# Patient Record
Sex: FEMALE | Race: BLACK OR AFRICAN AMERICAN | Employment: FULL TIME | URBAN - METROPOLITAN AREA
[De-identification: names, ages, dates, MRNs, and addresses within clinical notes are randomized per-mention and may not be internally consistent; named-entity substitution may affect disease eponyms.]

---

## 2019-09-22 ENCOUNTER — HOSPITAL ENCOUNTER (EMERGENCY)
Age: 44
Discharge: HOME | End: 2019-09-22
Attending: EMERGENCY MEDICINE
Payer: MEDICARE

## 2019-09-22 ENCOUNTER — APPOINTMENT (OUTPATIENT)
Dept: GENERAL RADIOLOGY | Age: 44
End: 2019-09-22
Payer: MEDICARE

## 2019-09-22 VITALS
RESPIRATION RATE: 16 BRPM | WEIGHT: 240 LBS | HEART RATE: 69 BPM | BODY MASS INDEX: 36.37 KG/M2 | HEIGHT: 68 IN | SYSTOLIC BLOOD PRESSURE: 137 MMHG | OXYGEN SATURATION: 100 % | DIASTOLIC BLOOD PRESSURE: 99 MMHG | TEMPERATURE: 98.1 F

## 2019-09-22 DIAGNOSIS — M54.42 ACUTE BILATERAL LOW BACK PAIN WITH BILATERAL SCIATICA: Primary | ICD-10-CM

## 2019-09-22 DIAGNOSIS — M54.41 ACUTE BILATERAL LOW BACK PAIN WITH BILATERAL SCIATICA: Primary | ICD-10-CM

## 2019-09-22 PROCEDURE — 6370000000 HC RX 637 (ALT 250 FOR IP): Performed by: PHYSICIAN ASSISTANT

## 2019-09-22 PROCEDURE — 99283 EMERGENCY DEPT VISIT LOW MDM: CPT

## 2019-09-22 PROCEDURE — 96372 THER/PROPH/DIAG INJ SC/IM: CPT

## 2019-09-22 PROCEDURE — 72100 X-RAY EXAM L-S SPINE 2/3 VWS: CPT

## 2019-09-22 PROCEDURE — 6360000002 HC RX W HCPCS: Performed by: PHYSICIAN ASSISTANT

## 2019-09-22 RX ORDER — CYCLOBENZAPRINE HCL 10 MG
10 TABLET ORAL ONCE
Status: COMPLETED | OUTPATIENT
Start: 2019-09-22 | End: 2019-09-22

## 2019-09-22 RX ORDER — BUDESONIDE AND FORMOTEROL FUMARATE DIHYDRATE 160; 4.5 UG/1; UG/1
2 AEROSOL RESPIRATORY (INHALATION) 2 TIMES DAILY
COMMUNITY
Start: 2013-08-01

## 2019-09-22 RX ORDER — MONTELUKAST SODIUM 10 MG/1
10 TABLET ORAL DAILY
Status: ON HOLD | COMMUNITY
Start: 2013-08-01 | End: 2020-08-23

## 2019-09-22 RX ORDER — NAPROXEN 500 MG/1
500 TABLET ORAL 2 TIMES DAILY WITH MEALS
Qty: 30 TABLET | Refills: 0 | Status: ON HOLD | OUTPATIENT
Start: 2019-09-22 | End: 2020-08-23

## 2019-09-22 RX ORDER — LIDOCAINE 4 G/G
1 PATCH TOPICAL DAILY
Status: DISCONTINUED | OUTPATIENT
Start: 2019-09-22 | End: 2019-09-22 | Stop reason: HOSPADM

## 2019-09-22 RX ORDER — CYCLOBENZAPRINE HCL 5 MG
5 TABLET ORAL 3 TIMES DAILY PRN
Qty: 10 TABLET | Refills: 0 | Status: SHIPPED | OUTPATIENT
Start: 2019-09-22 | End: 2019-10-02

## 2019-09-22 RX ORDER — KETOROLAC TROMETHAMINE 30 MG/ML
15 INJECTION, SOLUTION INTRAMUSCULAR; INTRAVENOUS ONCE
Status: COMPLETED | OUTPATIENT
Start: 2019-09-22 | End: 2019-09-22

## 2019-09-22 RX ORDER — LEVOTHYROXINE SODIUM 175 UG/1
175 TABLET ORAL DAILY
COMMUNITY
Start: 2014-09-12

## 2019-09-22 RX ADMIN — CYCLOBENZAPRINE HYDROCHLORIDE 10 MG: 10 TABLET, FILM COATED ORAL at 10:40

## 2019-09-22 RX ADMIN — KETOROLAC TROMETHAMINE 15 MG: 30 INJECTION, SOLUTION INTRAMUSCULAR at 10:40

## 2019-09-22 ASSESSMENT — ENCOUNTER SYMPTOMS
CHEST TIGHTNESS: 0
NAUSEA: 0
ABDOMINAL PAIN: 0
SHORTNESS OF BREATH: 0
VOMITING: 0
BACK PAIN: 1

## 2019-09-22 ASSESSMENT — PAIN SCALES - GENERAL: PAINLEVEL_OUTOF10: 9

## 2019-09-22 NOTE — ED PROVIDER NOTES
Medications    BUDESONIDE-FORMOTEROL (SYMBICORT) 160-4.5 MCG/ACT AERO    Inhale 2 puffs into the lungs 2 times daily    LEVOTHYROXINE (SYNTHROID) 175 MCG TABLET    Take 175 mcg by mouth daily    MONTELUKAST (SINGULAIR) 10 MG TABLET    Take 10 mg by mouth daily       Allergies     She is allergic to penicillins. Physical Exam     INITIAL VITALS: BP: (!) 170/111, Temp: 98.1 °F (36.7 °C), Pulse: 79, Resp: 16, SpO2: 100 %  Physical Exam   Constitutional: She appears well-developed and well-nourished. No distress. HENT:   Head: Normocephalic and atraumatic. Eyes: Pupils are equal, round, and reactive to light. EOM are normal.   Cardiovascular: Normal rate, regular rhythm, normal heart sounds and intact distal pulses. Exam reveals no gallop and no friction rub. No murmur heard. Pulmonary/Chest: Effort normal and breath sounds normal. No respiratory distress. Musculoskeletal:   Mild midline lower lumbar spinal tenderness extending bilaterally into the point paraspinal muscle body with positive straight leg raise bilaterally, patellar reflexes are equal and normal bilaterally, gait assessed and normal, sensation intact in bilateral lower extremities with 2+ DP pulse, dorsiflexion and plantarflexion normal   Neurological: She is alert. Skin: She is not diaphoretic. Psychiatric: She has a normal mood and affect. Her behavior is normal.   Nursing note and vitals reviewed. Diagnostic Results     RADIOLOGY:  XR LUMBAR SPINE (2-3 VIEWS)   Final Result      Degenerative change      If concern for acute injury splinting with follow-up recommended          LABS:   No results found for this visit on 09/22/19. ED BEDSIDE ULTRASOUND:  none    RECENT VITALS:  BP: (!) 170/111, Temp: 98.1 °F (36.7 °C), Pulse: 79, Resp: 16, SpO2: 100 %     Procedures   None    ED Course     Nursing Notes, Past Medical Hx,Past Surgical Hx, Social Hx, Allergies, and Family Hx were reviewed.     The patient was given the following

## 2019-11-21 ENCOUNTER — APPOINTMENT (OUTPATIENT)
Dept: GENERAL RADIOLOGY | Age: 44
End: 2019-11-21
Payer: MEDICARE

## 2019-11-21 ENCOUNTER — HOSPITAL ENCOUNTER (EMERGENCY)
Age: 44
Discharge: HOME | End: 2019-11-21
Attending: EMERGENCY MEDICINE
Payer: MEDICARE

## 2019-11-21 VITALS
SYSTOLIC BLOOD PRESSURE: 201 MMHG | WEIGHT: 293 LBS | BODY MASS INDEX: 46.38 KG/M2 | RESPIRATION RATE: 18 BRPM | OXYGEN SATURATION: 99 % | HEART RATE: 80 BPM | TEMPERATURE: 98.2 F | DIASTOLIC BLOOD PRESSURE: 109 MMHG

## 2019-11-21 DIAGNOSIS — M79.89 LEG SWELLING: Primary | ICD-10-CM

## 2019-11-21 DIAGNOSIS — R60.0 LEG EDEMA: ICD-10-CM

## 2019-11-21 LAB
A/G RATIO: 1.3 (ref 1.1–2.2)
ALBUMIN SERPL-MCNC: 4.4 G/DL (ref 3.4–5)
ALP BLD-CCNC: 57 U/L (ref 40–129)
ALT SERPL-CCNC: 9 U/L (ref 10–40)
ANION GAP SERPL CALCULATED.3IONS-SCNC: 12 MMOL/L (ref 3–16)
AST SERPL-CCNC: 16 U/L (ref 15–37)
BILIRUB SERPL-MCNC: <0.2 MG/DL (ref 0–1)
BUN BLDV-MCNC: 19 MG/DL (ref 7–20)
CALCIUM SERPL-MCNC: 10 MG/DL (ref 8.3–10.6)
CHLORIDE BLD-SCNC: 103 MMOL/L (ref 99–110)
CO2: 20 MMOL/L (ref 21–32)
CREAT SERPL-MCNC: 1.2 MG/DL (ref 0.6–1.1)
D DIMER: <200 NG/ML DDU (ref 0–229)
GFR AFRICAN AMERICAN: 60
GFR NON-AFRICAN AMERICAN: 49
GLOBULIN: 3.5 G/DL
GLUCOSE BLD-MCNC: 92 MG/DL (ref 70–99)
MAGNESIUM: 1.5 MG/DL (ref 1.8–2.4)
POTASSIUM REFLEX MAGNESIUM: 3.5 MMOL/L (ref 3.5–5.1)
PRO-BNP: 418 PG/ML (ref 0–124)
SODIUM BLD-SCNC: 135 MMOL/L (ref 136–145)
TOTAL PROTEIN: 7.9 G/DL (ref 6.4–8.2)

## 2019-11-21 PROCEDURE — 36415 COLL VENOUS BLD VENIPUNCTURE: CPT

## 2019-11-21 PROCEDURE — 99283 EMERGENCY DEPT VISIT LOW MDM: CPT

## 2019-11-21 PROCEDURE — 83880 ASSAY OF NATRIURETIC PEPTIDE: CPT

## 2019-11-21 PROCEDURE — 71046 X-RAY EXAM CHEST 2 VIEWS: CPT

## 2019-11-21 PROCEDURE — 80053 COMPREHEN METABOLIC PANEL: CPT

## 2019-11-21 PROCEDURE — 83735 ASSAY OF MAGNESIUM: CPT

## 2019-11-21 PROCEDURE — 6370000000 HC RX 637 (ALT 250 FOR IP): Performed by: EMERGENCY MEDICINE

## 2019-11-21 PROCEDURE — 85379 FIBRIN DEGRADATION QUANT: CPT

## 2019-11-21 RX ORDER — MAGNESIUM 30 MG
30 TABLET ORAL DAILY
Qty: 30 TABLET | Refills: 3 | Status: ON HOLD | OUTPATIENT
Start: 2019-11-21 | End: 2020-08-23

## 2019-11-21 RX ORDER — POTASSIUM CHLORIDE 20 MEQ/1
20 TABLET, EXTENDED RELEASE ORAL DAILY
Qty: 30 TABLET | Refills: 0 | Status: ON HOLD | OUTPATIENT
Start: 2019-11-21 | End: 2020-08-23

## 2019-11-21 RX ORDER — HYDROCHLOROTHIAZIDE 25 MG/1
25 TABLET ORAL DAILY
Qty: 30 TABLET | Refills: 0 | Status: ON HOLD | OUTPATIENT
Start: 2019-11-21 | End: 2020-08-23

## 2019-11-21 RX ORDER — ACETAMINOPHEN 500 MG
1000 TABLET ORAL ONCE
Status: COMPLETED | OUTPATIENT
Start: 2019-11-21 | End: 2019-11-21

## 2019-11-21 RX ADMIN — ACETAMINOPHEN 1000 MG: 500 TABLET ORAL at 17:39

## 2019-11-21 ASSESSMENT — PAIN SCALES - GENERAL: PAINLEVEL_OUTOF10: 5

## 2019-11-25 ASSESSMENT — ENCOUNTER SYMPTOMS
NAUSEA: 0
COUGH: 0
ABDOMINAL PAIN: 0
CONSTIPATION: 0
EYES NEGATIVE: 1
DIARRHEA: 0

## 2020-08-23 ENCOUNTER — APPOINTMENT (OUTPATIENT)
Dept: GENERAL RADIOLOGY | Age: 45
DRG: 872 | End: 2020-08-23
Payer: COMMERCIAL

## 2020-08-23 ENCOUNTER — HOSPITAL ENCOUNTER (INPATIENT)
Age: 45
LOS: 2 days | Discharge: HOME | DRG: 872 | End: 2020-08-25
Attending: EMERGENCY MEDICINE | Admitting: INTERNAL MEDICINE
Payer: COMMERCIAL

## 2020-08-23 ENCOUNTER — APPOINTMENT (OUTPATIENT)
Dept: CT IMAGING | Age: 45
DRG: 872 | End: 2020-08-23
Payer: COMMERCIAL

## 2020-08-23 PROBLEM — E87.6 HYPOKALEMIA: Status: ACTIVE | Noted: 2020-08-23

## 2020-08-23 PROBLEM — D64.9 ANEMIA: Status: ACTIVE | Noted: 2020-08-23

## 2020-08-23 PROBLEM — E87.1 HYPONATREMIA: Status: ACTIVE | Noted: 2020-08-23

## 2020-08-23 PROBLEM — E89.0 POSTOPERATIVE HYPOTHYROIDISM: Status: ACTIVE | Noted: 2020-08-23

## 2020-08-23 PROBLEM — E11.9 TYPE 2 DIABETES MELLITUS (HCC): Status: ACTIVE | Noted: 2020-08-23

## 2020-08-23 PROBLEM — R51.9 HEADACHE: Status: ACTIVE | Noted: 2020-08-23

## 2020-08-23 PROBLEM — E66.01 MORBID (SEVERE) OBESITY DUE TO EXCESS CALORIES (HCC): Status: ACTIVE | Noted: 2020-08-23

## 2020-08-23 PROBLEM — N17.9 AKI (ACUTE KIDNEY INJURY) (HCC): Status: ACTIVE | Noted: 2020-08-23

## 2020-08-23 LAB
ALBUMIN SERPL-MCNC: 3.5 G/DL (ref 3.4–5)
ALP BLD-CCNC: 81 U/L (ref 40–129)
ALT SERPL-CCNC: 16 U/L (ref 10–40)
ANION GAP SERPL CALCULATED.3IONS-SCNC: 15 MMOL/L (ref 3–16)
AST SERPL-CCNC: 31 U/L (ref 15–37)
BACTERIA: ABNORMAL /HPF
BASOPHILS ABSOLUTE: 0.1 K/UL (ref 0–0.2)
BASOPHILS RELATIVE PERCENT: 0.8 %
BILIRUB SERPL-MCNC: 0.4 MG/DL (ref 0–1)
BILIRUBIN DIRECT: <0.2 MG/DL (ref 0–0.3)
BILIRUBIN URINE: NEGATIVE
BILIRUBIN, INDIRECT: NORMAL MG/DL (ref 0–1)
BLOOD, URINE: ABNORMAL
BUN BLDV-MCNC: 42 MG/DL (ref 7–20)
CALCIUM SERPL-MCNC: 10.5 MG/DL (ref 8.3–10.6)
CHLORIDE BLD-SCNC: 97 MMOL/L (ref 99–110)
CLARITY: ABNORMAL
CO2: 22 MMOL/L (ref 21–32)
COLOR: YELLOW
COMMENT UA: ABNORMAL
CREAT SERPL-MCNC: 2 MG/DL (ref 0.6–1.1)
EOSINOPHILS ABSOLUTE: 0 K/UL (ref 0–0.6)
EOSINOPHILS RELATIVE PERCENT: 0.4 %
EPITHELIAL CELLS, UA: 2 /HPF (ref 0–5)
FERRITIN: 235.5 NG/ML (ref 15–150)
FOLATE: 6.42 NG/ML (ref 4.78–24.2)
GFR AFRICAN AMERICAN: 33
GFR NON-AFRICAN AMERICAN: 27
GLUCOSE BLD-MCNC: 131 MG/DL (ref 70–99)
GLUCOSE BLD-MCNC: 167 MG/DL (ref 70–99)
GLUCOSE BLD-MCNC: 180 MG/DL (ref 70–99)
GLUCOSE URINE: NEGATIVE MG/DL
HCG(URINE) PREGNANCY TEST: NEGATIVE
HCT VFR BLD CALC: 30.9 % (ref 36–48)
HEMOGLOBIN: 10.6 G/DL (ref 12–16)
HYALINE CASTS: 5 /LPF (ref 0–8)
IRON SATURATION: 4 % (ref 15–50)
IRON: 9 UG/DL (ref 37–145)
KETONES, URINE: NEGATIVE MG/DL
LEUKOCYTE ESTERASE, URINE: NEGATIVE
LYMPHOCYTES ABSOLUTE: 0.9 K/UL (ref 1–5.1)
LYMPHOCYTES RELATIVE PERCENT: 10.8 %
MAGNESIUM: 1.6 MG/DL (ref 1.8–2.4)
MCH RBC QN AUTO: 34.6 PG (ref 26–34)
MCHC RBC AUTO-ENTMCNC: 34.2 G/DL (ref 31–36)
MCV RBC AUTO: 101.4 FL (ref 80–100)
MICROSCOPIC EXAMINATION: YES
MONOCYTES ABSOLUTE: 0.6 K/UL (ref 0–1.3)
MONOCYTES RELATIVE PERCENT: 6.8 %
NEUTROPHILS ABSOLUTE: 6.6 K/UL (ref 1.7–7.7)
NEUTROPHILS RELATIVE PERCENT: 81.2 %
NITRITE, URINE: NEGATIVE
PDW BLD-RTO: 13.8 % (ref 12.4–15.4)
PERFORMED ON: ABNORMAL
PERFORMED ON: ABNORMAL
PH UA: 5.5 (ref 5–8)
PLATELET # BLD: 304 K/UL (ref 135–450)
PMV BLD AUTO: 8.6 FL (ref 5–10.5)
POTASSIUM REFLEX MAGNESIUM: 3.1 MMOL/L (ref 3.5–5.1)
POTASSIUM SERPL-SCNC: 3.2 MMOL/L (ref 3.5–5.1)
POTASSIUM SERPL-SCNC: 3.2 MMOL/L (ref 3.5–5.1)
PROTEIN UA: 100 MG/DL
RBC # BLD: 3.05 M/UL (ref 4–5.2)
RBC UA: 16 /HPF (ref 0–4)
SARS-COV-2, NAAT: NOT DETECTED
SODIUM BLD-SCNC: 134 MMOL/L (ref 136–145)
SPECIFIC GRAVITY UA: 1.01 (ref 1–1.03)
T4 FREE: 1.3 NG/DL (ref 0.9–1.8)
TOTAL CK: 184 U/L (ref 26–192)
TOTAL IRON BINDING CAPACITY: 232 UG/DL (ref 260–445)
TOTAL PROTEIN: 7.6 G/DL (ref 6.4–8.2)
TROPONIN: <0.01 NG/ML
TSH SERPL DL<=0.05 MIU/L-ACNC: 1.55 UIU/ML (ref 0.27–4.2)
URINE REFLEX TO CULTURE: YES
URINE TYPE: ABNORMAL
UROBILINOGEN, URINE: 1 E.U./DL
VITAMIN B-12: 404 PG/ML (ref 211–911)
VITAMIN D 25-HYDROXY: 22.7 NG/ML
WBC # BLD: 8.2 K/UL (ref 4–11)
WBC UA: 22 /HPF (ref 0–5)
YEAST: PRESENT /HPF

## 2020-08-23 PROCEDURE — 96372 THER/PROPH/DIAG INJ SC/IM: CPT

## 2020-08-23 PROCEDURE — 94640 AIRWAY INHALATION TREATMENT: CPT

## 2020-08-23 PROCEDURE — 2580000003 HC RX 258: Performed by: PHYSICIAN ASSISTANT

## 2020-08-23 PROCEDURE — 82607 VITAMIN B-12: CPT

## 2020-08-23 PROCEDURE — 99285 EMERGENCY DEPT VISIT HI MDM: CPT

## 2020-08-23 PROCEDURE — U0002 COVID-19 LAB TEST NON-CDC: HCPCS

## 2020-08-23 PROCEDURE — 70450 CT HEAD/BRAIN W/O DYE: CPT

## 2020-08-23 PROCEDURE — 6360000002 HC RX W HCPCS: Performed by: PHYSICIAN ASSISTANT

## 2020-08-23 PROCEDURE — 82550 ASSAY OF CK (CPK): CPT

## 2020-08-23 PROCEDURE — 96365 THER/PROPH/DIAG IV INF INIT: CPT

## 2020-08-23 PROCEDURE — 96376 TX/PRO/DX INJ SAME DRUG ADON: CPT

## 2020-08-23 PROCEDURE — 82306 VITAMIN D 25 HYDROXY: CPT

## 2020-08-23 PROCEDURE — 84132 ASSAY OF SERUM POTASSIUM: CPT

## 2020-08-23 PROCEDURE — 2060000000 HC ICU INTERMEDIATE R&B

## 2020-08-23 PROCEDURE — 6370000000 HC RX 637 (ALT 250 FOR IP): Performed by: INTERNAL MEDICINE

## 2020-08-23 PROCEDURE — 93005 ELECTROCARDIOGRAM TRACING: CPT | Performed by: PHYSICIAN ASSISTANT

## 2020-08-23 PROCEDURE — 80076 HEPATIC FUNCTION PANEL: CPT

## 2020-08-23 PROCEDURE — 96374 THER/PROPH/DIAG INJ IV PUSH: CPT

## 2020-08-23 PROCEDURE — 80048 BASIC METABOLIC PNL TOTAL CA: CPT

## 2020-08-23 PROCEDURE — G0378 HOSPITAL OBSERVATION PER HR: HCPCS

## 2020-08-23 PROCEDURE — 82728 ASSAY OF FERRITIN: CPT

## 2020-08-23 PROCEDURE — 82746 ASSAY OF FOLIC ACID SERUM: CPT

## 2020-08-23 PROCEDURE — 83735 ASSAY OF MAGNESIUM: CPT

## 2020-08-23 PROCEDURE — 96375 TX/PRO/DX INJ NEW DRUG ADDON: CPT

## 2020-08-23 PROCEDURE — 96367 TX/PROPH/DG ADDL SEQ IV INF: CPT

## 2020-08-23 PROCEDURE — 6370000000 HC RX 637 (ALT 250 FOR IP): Performed by: PHYSICIAN ASSISTANT

## 2020-08-23 PROCEDURE — 84439 ASSAY OF FREE THYROXINE: CPT

## 2020-08-23 PROCEDURE — 84484 ASSAY OF TROPONIN QUANT: CPT

## 2020-08-23 PROCEDURE — 84703 CHORIONIC GONADOTROPIN ASSAY: CPT

## 2020-08-23 PROCEDURE — 2580000003 HC RX 258: Performed by: INTERNAL MEDICINE

## 2020-08-23 PROCEDURE — 83540 ASSAY OF IRON: CPT

## 2020-08-23 PROCEDURE — 81001 URINALYSIS AUTO W/SCOPE: CPT

## 2020-08-23 PROCEDURE — 83036 HEMOGLOBIN GLYCOSYLATED A1C: CPT

## 2020-08-23 PROCEDURE — 87086 URINE CULTURE/COLONY COUNT: CPT

## 2020-08-23 PROCEDURE — 84443 ASSAY THYROID STIM HORMONE: CPT

## 2020-08-23 PROCEDURE — 85025 COMPLETE CBC W/AUTO DIFF WBC: CPT

## 2020-08-23 PROCEDURE — 36415 COLL VENOUS BLD VENIPUNCTURE: CPT

## 2020-08-23 PROCEDURE — 83550 IRON BINDING TEST: CPT

## 2020-08-23 PROCEDURE — 94760 N-INVAS EAR/PLS OXIMETRY 1: CPT

## 2020-08-23 PROCEDURE — 6360000002 HC RX W HCPCS: Performed by: INTERNAL MEDICINE

## 2020-08-23 PROCEDURE — 96366 THER/PROPH/DIAG IV INF ADDON: CPT

## 2020-08-23 PROCEDURE — 71045 X-RAY EXAM CHEST 1 VIEW: CPT

## 2020-08-23 RX ORDER — SODIUM CHLORIDE, SODIUM LACTATE, POTASSIUM CHLORIDE, CALCIUM CHLORIDE 600; 310; 30; 20 MG/100ML; MG/100ML; MG/100ML; MG/100ML
INJECTION, SOLUTION INTRAVENOUS CONTINUOUS
Status: DISCONTINUED | OUTPATIENT
Start: 2020-08-23 | End: 2020-08-25

## 2020-08-23 RX ORDER — PROMETHAZINE HYDROCHLORIDE 25 MG/1
12.5 TABLET ORAL EVERY 6 HOURS PRN
Status: DISCONTINUED | OUTPATIENT
Start: 2020-08-23 | End: 2020-08-25 | Stop reason: HOSPADM

## 2020-08-23 RX ORDER — POLYETHYLENE GLYCOL 3350 17 G/17G
17 POWDER, FOR SOLUTION ORAL DAILY PRN
Status: DISCONTINUED | OUTPATIENT
Start: 2020-08-23 | End: 2020-08-25 | Stop reason: HOSPADM

## 2020-08-23 RX ORDER — MAGNESIUM SULFATE IN WATER 40 MG/ML
2 INJECTION, SOLUTION INTRAVENOUS ONCE
Status: COMPLETED | OUTPATIENT
Start: 2020-08-23 | End: 2020-08-23

## 2020-08-23 RX ORDER — AMLODIPINE BESYLATE 5 MG/1
10 TABLET ORAL DAILY
Status: ON HOLD | COMMUNITY
End: 2022-04-13 | Stop reason: HOSPADM

## 2020-08-23 RX ORDER — DEXTROSE MONOHYDRATE 50 MG/ML
100 INJECTION, SOLUTION INTRAVENOUS PRN
Status: DISCONTINUED | OUTPATIENT
Start: 2020-08-23 | End: 2020-08-25 | Stop reason: HOSPADM

## 2020-08-23 RX ORDER — CLONIDINE HYDROCHLORIDE 0.2 MG/1
0.2 TABLET ORAL 2 TIMES DAILY
COMMUNITY

## 2020-08-23 RX ORDER — SODIUM CHLORIDE 0.9 % (FLUSH) 0.9 %
10 SYRINGE (ML) INJECTION PRN
Status: DISCONTINUED | OUTPATIENT
Start: 2020-08-23 | End: 2020-08-25 | Stop reason: HOSPADM

## 2020-08-23 RX ORDER — NICOTINE POLACRILEX 4 MG
15 LOZENGE BUCCAL PRN
Status: DISCONTINUED | OUTPATIENT
Start: 2020-08-23 | End: 2020-08-25 | Stop reason: HOSPADM

## 2020-08-23 RX ORDER — MONTELUKAST SODIUM 10 MG/1
10 TABLET ORAL NIGHTLY
Status: DISCONTINUED | OUTPATIENT
Start: 2020-08-23 | End: 2020-08-23

## 2020-08-23 RX ORDER — 0.9 % SODIUM CHLORIDE 0.9 %
1000 INTRAVENOUS SOLUTION INTRAVENOUS ONCE
Status: COMPLETED | OUTPATIENT
Start: 2020-08-23 | End: 2020-08-23

## 2020-08-23 RX ORDER — BUTALBITAL, ACETAMINOPHEN AND CAFFEINE 50; 325; 40 MG/1; MG/1; MG/1
2 TABLET ORAL ONCE
Status: COMPLETED | OUTPATIENT
Start: 2020-08-23 | End: 2020-08-23

## 2020-08-23 RX ORDER — CLONIDINE HYDROCHLORIDE 0.1 MG/1
0.2 TABLET ORAL 2 TIMES DAILY
Status: DISCONTINUED | OUTPATIENT
Start: 2020-08-23 | End: 2020-08-25 | Stop reason: HOSPADM

## 2020-08-23 RX ORDER — POTASSIUM CHLORIDE 7.45 MG/ML
10 INJECTION INTRAVENOUS
Status: DISPENSED | OUTPATIENT
Start: 2020-08-23 | End: 2020-08-23

## 2020-08-23 RX ORDER — SODIUM CHLORIDE 0.9 % (FLUSH) 0.9 %
10 SYRINGE (ML) INJECTION EVERY 12 HOURS SCHEDULED
Status: DISCONTINUED | OUTPATIENT
Start: 2020-08-23 | End: 2020-08-25 | Stop reason: HOSPADM

## 2020-08-23 RX ORDER — AMLODIPINE BESYLATE 10 MG/1
10 TABLET ORAL DAILY
Status: DISCONTINUED | OUTPATIENT
Start: 2020-08-24 | End: 2020-08-24

## 2020-08-23 RX ORDER — ONDANSETRON 2 MG/ML
4 INJECTION INTRAMUSCULAR; INTRAVENOUS EVERY 6 HOURS PRN
Status: DISCONTINUED | OUTPATIENT
Start: 2020-08-23 | End: 2020-08-25 | Stop reason: HOSPADM

## 2020-08-23 RX ORDER — DEXTROSE MONOHYDRATE 25 G/50ML
12.5 INJECTION, SOLUTION INTRAVENOUS PRN
Status: DISCONTINUED | OUTPATIENT
Start: 2020-08-23 | End: 2020-08-25 | Stop reason: HOSPADM

## 2020-08-23 RX ORDER — BUDESONIDE AND FORMOTEROL FUMARATE DIHYDRATE 160; 4.5 UG/1; UG/1
2 AEROSOL RESPIRATORY (INHALATION) 2 TIMES DAILY
Status: DISCONTINUED | OUTPATIENT
Start: 2020-08-23 | End: 2020-08-25 | Stop reason: HOSPADM

## 2020-08-23 RX ORDER — FLUCONAZOLE 100 MG/1
150 TABLET ORAL ONCE
Status: COMPLETED | OUTPATIENT
Start: 2020-08-23 | End: 2020-08-23

## 2020-08-23 RX ORDER — ACETAMINOPHEN 325 MG/1
650 TABLET ORAL EVERY 6 HOURS PRN
Status: DISCONTINUED | OUTPATIENT
Start: 2020-08-23 | End: 2020-08-25 | Stop reason: HOSPADM

## 2020-08-23 RX ORDER — ACETAMINOPHEN 650 MG/1
650 SUPPOSITORY RECTAL EVERY 6 HOURS PRN
Status: DISCONTINUED | OUTPATIENT
Start: 2020-08-23 | End: 2020-08-25 | Stop reason: HOSPADM

## 2020-08-23 RX ORDER — POTASSIUM CHLORIDE 7.45 MG/ML
10 INJECTION INTRAVENOUS ONCE
Status: COMPLETED | OUTPATIENT
Start: 2020-08-23 | End: 2020-08-23

## 2020-08-23 RX ORDER — METOCLOPRAMIDE HYDROCHLORIDE 5 MG/ML
10 INJECTION INTRAMUSCULAR; INTRAVENOUS ONCE
Status: COMPLETED | OUTPATIENT
Start: 2020-08-23 | End: 2020-08-23

## 2020-08-23 RX ADMIN — BUTALBITAL, ACETAMINOPHEN, AND CAFFEINE 2 TABLET: 50; 325; 40 TABLET ORAL at 10:47

## 2020-08-23 RX ADMIN — FLUCONAZOLE 150 MG: 100 TABLET ORAL at 11:45

## 2020-08-23 RX ADMIN — POTASSIUM CHLORIDE 10 MEQ: 7.46 INJECTION, SOLUTION INTRAVENOUS at 12:22

## 2020-08-23 RX ADMIN — SODIUM CHLORIDE, POTASSIUM CHLORIDE, SODIUM LACTATE AND CALCIUM CHLORIDE: 600; 310; 30; 20 INJECTION, SOLUTION INTRAVENOUS at 15:08

## 2020-08-23 RX ADMIN — SODIUM CHLORIDE 1000 ML: 9 INJECTION, SOLUTION INTRAVENOUS at 10:47

## 2020-08-23 RX ADMIN — METOCLOPRAMIDE HYDROCHLORIDE 10 MG: 5 INJECTION INTRAMUSCULAR; INTRAVENOUS at 10:47

## 2020-08-23 RX ADMIN — IRON SUCROSE 200 MG: 20 INJECTION, SOLUTION INTRAVENOUS at 15:08

## 2020-08-23 RX ADMIN — POTASSIUM CHLORIDE 10 MEQ: 7.46 INJECTION, SOLUTION INTRAVENOUS at 15:07

## 2020-08-23 RX ADMIN — BUDESONIDE AND FORMOTEROL FUMARATE DIHYDRATE 2 PUFF: 160; 4.5 AEROSOL RESPIRATORY (INHALATION) at 20:24

## 2020-08-23 RX ADMIN — MAGNESIUM SULFATE HEPTAHYDRATE 2 G: 40 INJECTION, SOLUTION INTRAVENOUS at 15:07

## 2020-08-23 RX ADMIN — ENOXAPARIN SODIUM 40 MG: 40 INJECTION SUBCUTANEOUS at 15:08

## 2020-08-23 RX ADMIN — Medication 10 ML: at 20:58

## 2020-08-23 RX ADMIN — INSULIN LISPRO 1 UNITS: 100 INJECTION, SOLUTION INTRAVENOUS; SUBCUTANEOUS at 21:00

## 2020-08-23 RX ADMIN — ACETAMINOPHEN 650 MG: 325 TABLET ORAL at 23:56

## 2020-08-23 RX ADMIN — CEFTRIAXONE 1 G: 1 INJECTION, POWDER, FOR SOLUTION INTRAMUSCULAR; INTRAVENOUS at 11:46

## 2020-08-23 RX ADMIN — SODIUM CHLORIDE 1000 ML: 9 INJECTION, SOLUTION INTRAVENOUS at 11:49

## 2020-08-23 ASSESSMENT — PAIN SCALES - GENERAL
PAINLEVEL_OUTOF10: 0
PAINLEVEL_OUTOF10: 9
PAINLEVEL_OUTOF10: 0
PAINLEVEL_OUTOF10: 9

## 2020-08-23 ASSESSMENT — PAIN DESCRIPTION - DESCRIPTORS: DESCRIPTORS: THROBBING

## 2020-08-23 ASSESSMENT — PAIN DESCRIPTION - LOCATION: LOCATION: HEAD

## 2020-08-23 ASSESSMENT — PAIN DESCRIPTION - PAIN TYPE: TYPE: ACUTE PAIN

## 2020-08-23 ASSESSMENT — PAIN DESCRIPTION - FREQUENCY: FREQUENCY: INTERMITTENT

## 2020-08-23 NOTE — ED TRIAGE NOTES
Patient presents to ED via triage complaining of headache and body aches x2 days. Patient denies fever at home, denies trauma, denies hx of migraine. Patient resting in bed, no acute distress at this time. Respiration even and easy. Fiance at bedside.

## 2020-08-23 NOTE — ED NOTES
Report called to [facility] RN on 5N to assume care of pt      Fatuma Oliveira RN  08/23/20 6651

## 2020-08-23 NOTE — PROGRESS NOTES
4 Eyes Skin Assessment     The patient is being assess for  Admission    I agree that 2 RN's have performed a thorough Head to Toe Skin Assessment on the patient. ALL assessment sites listed below have been assessed. Areas assessed by both nurses:   [x]   Head, Face, and Ears   [x]   Shoulders, Back, and Chest  [x]   Arms, Elbows, and Hands   [x]   Coccyx, Sacrum, and IschIum  [x]   Legs, Feet, and Heels        Does the Patient have Skin Breakdown?   No         Cristobal Prevention initiated:  NA   Wound Care Orders initiated:  No      [facility] nurse consulted for Pressure Injury (Stage 3,4, Unstageable, DTI, NWPT, and Complex wounds), New and Established Ostomies:  No      Nurse 1 eSignature: Electronically signed by Mihir Regan RN on 8/23/20 at 3:34 PM EDT    **SHARE this note so that the co-signing nurse is able to place an eSignature**    Nurse 2 eSignature: Electronically signed by Pietro Manuel RN on 8/23/20 at 3:35 PM EDT

## 2020-08-23 NOTE — ED PROVIDER NOTES
Attending Supervisory Note/Shared Visit   I have personally performed a face to face diagnostic evaluation on this patient. I have reviewed the mid-levels findings and agree. History and Exam by me shows a 82-year-old female who presents for evaluation of generalized weakness. Recently treated outpatient for urinary tract infection with ciprofloxacin, with no improvement on this medication. On exam here, she is well-appearing, afebrile, with reassuring vital signs. Laboratory studies are reviewed, CBC is reassuring. BMP concerning for acute kidney injury with creatinine of 2.0, potassium 3.1. EKG is nonischemic, troponin negative. Magnesium slightly low at 1.6. Urinalysis concerning for persistent infection with 22 WBCs, rare bacteria, also yeast is present. Patient is treated with ceftriaxone, IV fluids, Diflucan. Given failure of outpatient oral therapy for UTI, and now acute kidney injury, we will admit for further management. Patient updated and agreeable with plan of care.         Per my interpretation:    Electrocardiogram (ECG) 8/23/2020 10:39 AM  RATE: normal  RHYTHM: normal sinus  AXIS: normal  INTERVALS: normal  ST-T WAVE CHANGES: No evidence of ST segment elevation or T-wave inversion  Prior for comparison-none     Jeffry Juárez MD  Attending Emergency Physician     Jeffry Juárez MD  08/23/20 0669

## 2020-08-23 NOTE — ED NOTES
Pt not wanting to go to covid rule out floor.  Sent perfect serve to admitting MD, awaiting response     Sherwin Brown RN  08/23/20 0680

## 2020-08-23 NOTE — PROGRESS NOTES
Pharmacy Medication Reconciliation Note     List of medications patient is currently taking is complete.     Source of information:   1. patient via phone    Notes regarding home medications:   1. took AM meds    Denies taking any other OTC or herbal medications    Craig Diaz PharmD, BCPS  8/23/2020  2:42 PM

## 2020-08-23 NOTE — H&P
Hospital Medicine History & Physical      PCP: [address] [place] [address]    Date of Admission: 8/23/2020    Date of Service: Pt seen/examined on 08/23/2020  and Admitted to Inpatient with expected LOS greater than two midnights due to medical therapy. Chief Complaint:  Fatigue, headache, low apetite      History Of Present Illness:   51-year-old morbidly obese female with a history of hypertension diabetes asthma hyperlipidemia presents to the ED with complains of not feeling well, generalized body aches x2 days, and onset of headache this morning. She says she feels generalized weakness. She has some mild cough but no shortness of breath. She has had minimal p.o. intake since Friday as has no appetite, denies nausea vomiting abdominal pain. Denies fever chills or any sick contacts. Denies any exposure to COVID-19. On arrival to the ED she is afebrile, heart rate 70s to 80s  Respiratory rate 22, blood pressure 129/73, she is 98 200% on room air. Labs were significant for sodium 134, potassium 3.1, BUN of 1242, creatinine of 2.0, mag of 1.6, liver biochemistry normal, hemoglobin 10.6, .4. She says she is a current every day half pack per day smoker drinks socially. Denies any illicit drug use. Patient being admitted for management of acute kidney injury, electrolyte normalities, generalized weakness, with some concern for coronavirus infection. Past Medical History:          Diagnosis Date    Asthma     Diabetes mellitus ([facility] [address]     Hyperlipidemia     Hypertension        Past Surgical History:      History reviewed. No pertinent surgical history. Medications Prior to Admission:      Prior to Admission medications    Medication Sig Start Date End Date Taking?  Authorizing Provider   cloNIDine (CATAPRES) 0.2 MG tablet Take 0.2 mg by mouth 2 times daily   Yes Historical Provider, MD   amLODIPine (NORVASC) 5 MG tablet Take 10 mg by mouth daily   Yes Historical Provider, MD   levothyroxine (SYNTHROID) 175 MCG tablet Take 175 mcg by mouth daily 9/12/14  Yes Historical Provider, MD   budesonide-formoterol (SYMBICORT) 160-4.5 MCG/ACT AERO Inhale 2 puffs into the lungs 2 times daily 8/1/13  Yes Historical Provider, MD       Allergies:  Penicillins    Social History:      The patient currently lives at home with her fiance and son    TOBACCO:   reports that she has been smoking cigarettes. She has been smoking about 0.50 packs per day. She has never used smokeless tobacco.  ETOH:   reports current alcohol use. Family History:       Reviewed    History reviewed. No pertinent family history. REVIEW OF SYSTEMS:   Pertinent positives as noted in the HPI. All other systems reviewed and negative. PHYSICAL EXAM PERFORMED:    BP 98/68   Pulse 79   Temp 101.6 °F (38.7 °C) (Oral)   Resp 16   Ht 5' 7\" (1.702 m)   Wt 277 lb (125.6 kg)   LMP 08/22/2020   SpO2 99%   BMI 43.38 kg/m²     General appearance:  No apparent distress, appears stated age and cooperative. HEENT:  Normal cephalic, atraumatic without obvious deformity. Pupils equal, round, and reactive to light. Extra ocular muscles intact. Conjunctivae/corneas clear. Neck: Supple, with full range of motion. No jugular venous distention. Trachea midline. Respiratory:  Normal respiratory effort. Clear to auscultation, bilaterally without Rales/Wheezes/Rhonchi. Cardiovascular:  Regular rate and rhythm with normal S1/S2 without murmurs, rubs or gallops. Abdomen: Soft, non-tender, non-distended with normal bowel sounds. Musculoskeletal:  No clubbing, cyanosis or edema bilaterally. Full range of motion without deformity. Skin: Skin color, texture, turgor normal.  No rashes or lesions. Neurologic:  Neurovascularly intact without any focal sensory/motor deficits.  Cranial nerves: II-XII intact, grossly non-focal.  Psychiatric:  Alert and oriented, thought content appropriate, normal insight  Capillary Refill: Brisk,< 3 seconds   Peripheral Pulses: +2 palpable, equal bilaterally       Labs:     Recent Labs     08/23/20  1051   WBC 8.2   HGB 10.6*   HCT 30.9*        Recent Labs     08/23/20  1051 08/23/20  1455 08/23/20  1759   *  --   --    K 3.1* 3.2* 3.2*   CL 97*  --   --    CO2 22  --   --    BUN 42*  --   --    CREATININE 2.0*  --   --    CALCIUM 10.5  --   --      Recent Labs     08/23/20  1051   AST 31   ALT 16   BILIDIR <0.2   BILITOT 0.4   ALKPHOS 81     No results for input(s): INR in the last 72 hours. Recent Labs     08/23/20  1051   CKTOTAL 184   TROPONINI <0.01       Urinalysis:      Lab Results   Component Value Date    NITRU Negative 08/23/2020    WBCUA 22 08/23/2020    BACTERIA Rare 08/23/2020    RBCUA 16 08/23/2020    BLOODU MODERATE 08/23/2020    SPECGRAV 1.014 08/23/2020    GLUCOSEU Negative 08/23/2020       Radiology:     CXR: I have reviewed the CXR with the following interpretation:reviewed  EKG:  I have reviewed the EKG with the following interpretation: n/a    CT HEAD WO CONTRAST   Final Result   No acute intracranial abnormality. XR CHEST PORTABLE   Final Result   No active cardiopulmonary disease             ASSESSMENT:    Active Hospital Problems    Diagnosis Date Noted    MIN (acute kidney injury) ([facility] [address] [N17.9] 08/23/2020    Headache [R51] 08/23/2020    Hypokalemia [E87.6] 08/23/2020    Hyponatremia [E87.1] 08/23/2020    Morbid (severe) obesity due to excess calories ([facility] [address] [E66.01] 08/23/2020    Anemia [D64.9] 08/23/2020    Type 2 diabetes mellitus ([facility] [address] [E11.9] 08/23/2020    Postoperative hypothyroidism [E89.0] 08/23/2020     Assessment and plan  Acute kidney injury, suspect possibly prerenal with decreased p.o. intake x2 days, she is on ibuprofen and hydrochlorothiazide at home. She established care with primary care physician 2 years ago and she was diagnosed with diabetes at that point.   I do not have a baseline for this patient  She does not have history of CKD per chart review  Continue IV fluids  Check renal profile daily check urine protein creatinine ratio  We will  Consult nephrology for further evaluation recommendations and will need follow-up as an outpatient    Type 2 diabetes non-insulin-dependent, she was on metformin, her PCP  change her to [facility] recently  For now I will hold oral antidiabetic medication  Continue low-dose Lantus and insulin  Check A1c  Check urine protein creatinine ratio    Headache  CT head ruled out acute intracranial abnormalities  Monitor closely  She was given a dose of Fioricet in the ED which improved her headache    Suspected 2019 coronavirus infection  She has very nonspecific symptoms low appetite generalized weakness fatigue, and the current pandemic going on I will send COVID-19 testing  I would like PCR testing, initially rapid test was done but there is a lot of false positive with this so we will rule out with PCR  Check inflammatory markers, d-dimer    Acute cystitis  UA positive for WBC, leukocyte Estrace  Continue Rocephin  Follow-up urine cultures    Generalized fatigue  Check Vitamin D, TSH, Free T4    Postoperative Hypothyroidism  Continue home synthroid  Follow up TSH, Free T4    Anemia, macrocytic  - Anemia sandoval inclduing B!2, Folate, Iron profile sent  - Monitor CBC daily    Morbid obesity due to excess calories Body mass index is 43.38 kg/m². - Complicating assessment and treatment. Placing patient at risk for multiple co-morbidities as well as early death and contributing to the patient's presentation.  on weight loss when appropriate. DVT Prophylaxis: Heparin sq  Diet: DIET CARB CONTROL;  Code Status: Full Code    PT/OT Eval Status:N/A    Dispo - Admit as inpatient. I anticipate hospitalization spanning more than two midnights for investigation and treatment of the above medically necessary diagnoses. Vy Trinidad MD    Thank you [facility] [address] [place] [address] for the opportunity to be involved in this patient's care.  If you have any questions or concerns please feel free to contact me at [phone].

## 2020-08-23 NOTE — ED PROVIDER NOTES
[address]      Pt Name: Dominick Wallace  MRN: [id number]  [place] 1975  Date of evaluation: 8/23/2020  Provider: NICK Hill    Shared Visit or Autonomous Visit:  I have seen and evaluated this patient with my supervising physician Claudean Shell, MD.      [address] [place] [address]       Chief Complaint   Patient presents with    Headache     Headache and generalized body aches x2 days. Denies fever. HISTORY OF PRESENT ILLNESS  (Location/Symptom, Timing/Onset, Context/Setting, Quality, Duration, Modifying Factors, Severity.)   Dominick Wallace is a 39 y.o. female who presents to the emergency department with a complaint of generalized weakness. Patient says for the last couple of days or so she has felt very weak, almost too tired to get out of bed. She says is a mild cough but no difficulty breathing. She says she is also had a persistent headache, throughout her whole head, no sudden onset or thunderclap. Denies history of frequent headaches or migraines. Denies chest pain or pain anywhere else. She says she did not eat much at all yesterday because she has no appetite, but she denies nausea, abdominal pain or diarrhea. Denies any fever or any known recent sick contacts. Denies any known exposure to COVID-19. Says she recently was started on antibiotic by her primary care provider for urine infection, she was also told she has problems with her kidneys, low potassium, and anemia. Says she has been taking her antibiotic, and otherwise takes blood pressure medications, but no other prescription meds. No other complaints. Nursing Notes were reviewed and I agree. REVIEW OF SYSTEMS    (2-9 systems for level 4, 10 or more for level 5)     Constitutional: Positive for fatigue, general weakness. Negative for fever, chills, and unexpected weight change.    HENT:  Negative for congestion, ear pain, facial swelling, rhinorrhea, sneezing, sore throat and trouble swallowing. Eyes:  Negative for photophobia, pain and visual disturbance. Respiratory: Positive for nonproductive cough. Negative for shortness of breath, wheezing and stridor. Cardiovascular:  Negative for chest pain, palpitations and leg swelling. Gastrointestinal: Positive for loss of appetite. Negative for nausea, vomiting, abdominal pain, diarrhea, constipation and blood in stool. Genitourinary:  Negative for dysuria, urgency, hematuria, flank pain, vaginal bleeding, vaginal discharge and pelvic pain. Musculoskeletal:  Negative for myalgias, arthralgias, neck pain and neck stiffness. Neurological:  Negative for dizziness, seizures, syncope, speech difficulty, focal weakness, numbness and headache. Psychiatric/Behavioral:  Negative for suicidal ideas, hallucinations, confusion. Except as noted above the remainder of the review of systems was reviewed and negative. PAST MEDICAL HISTORY         Diagnosis Date    Asthma     Diabetes mellitus ([facility] [address]     Hyperlipidemia     Hypertension        SURGICAL HISTORY     History reviewed. No pertinent surgical history. CURRENT MEDICATIONS       Previous Medications    BUDESONIDE-FORMOTEROL (SYMBICORT) 160-4.5 MCG/ACT AERO    Inhale 2 puffs into the lungs 2 times daily    HYDROCHLOROTHIAZIDE (HYDRODIURIL) 25 MG TABLET    Take 1 tablet by mouth daily    LEVOTHYROXINE (SYNTHROID) 175 MCG TABLET    Take 175 mcg by mouth daily    MAGNESIUM 30 MG TABLET    Take 1 tablet by mouth daily    MONTELUKAST (SINGULAIR) 10 MG TABLET    Take 10 mg by mouth daily    NAPROXEN (NAPROSYN) 500 MG TABLET    Take 1 tablet by mouth 2 times daily (with meals) for 30 doses    POTASSIUM CHLORIDE (KLOR-CON M) 20 MEQ EXTENDED RELEASE TABLET    Take 1 tablet by mouth daily       ALLERGIES     Penicillins    FAMILY HISTORY     History reviewed. No pertinent family history. No family status information on file.         SOCIAL HISTORY      reports that she has been smoking cigarettes. She has been smoking about 0.50 packs per day. She has never used smokeless tobacco. She reports current alcohol use. She reports current drug use. PHYSICAL EXAM    (up to 7 for level 4, 8 or more for level 5)     ED Triage Vitals [08/23/20 0952]   BP Temp Temp Source Pulse Resp SpO2 Height Weight   129/73 98.6 °F (37 °C) Oral 84 22 100 % 5' 7\" (1.702 m) 278 lb 10.6 oz (126.4 kg)       Constitutional:  Appearing well-developed and well-nourished. No distress. HENT:  Normocephalic and atraumatic. Conjunctivae and EOM are normal. Pupils are equal, round, and reactive to light. Neck:  Normal range of motion. Neck supple. No tracheal deviation present. No thyromegaly present. No cervical adenopathy. Cardiovascular:  Normal rate, regular rhythm, normal heart sounds and intact distal pulses. Pulmonary/Chest:  Effort normal and breath sounds normal. No respiratory distress. No wheezes or rales. Abdominal:  Soft. Bowel sounds are normal. No distension, mass, tenderness, rebound or guarding. Musculoskeletal:  Normal range of motion. No edema exhibited. Neurological:  Alert and oriented to person, place, and time. No cranial nerve deficit. Skin:  Skin is warm and dry. Not diaphoretic. Psychiatric:  Normal mood, affect, behavior, judgment and thought content.        DIAGNOSTIC RESULTS     RADIOLOGY:   Interpretation per the Radiologist below, if available at the time of this note:    XR CHEST PORTABLE   Final Result   No active cardiopulmonary disease             LABS:  Labs Reviewed   URINE RT REFLEX TO CULTURE - Abnormal; Notable for the following components:       Result Value    Clarity, UA CLOUDY (*)     Blood, Urine MODERATE (*)     Protein,  (*)     All other components within normal limits    Narrative:     Performed at:  [address] [address]   Phone [phone]   MICROSCOPIC URINALYSIS - Abnormal; Notable for the following components:    Bacteria, UA Rare (*)     Yeast, UA Present (*)     WBC, UA 22 (*)     RBC, UA 16 (*)     All other components within normal limits    Narrative:     Performed at:  [address] [facility] [address]   Phone [phone]   CBC WITH AUTO DIFFERENTIAL - Abnormal; Notable for the following components:    RBC 3.05 (*)     Hemoglobin 10.6 (*)     Hematocrit 30.9 (*)     .4 (*)     MCH 34.6 (*)     Lymphocytes Absolute 0.9 (*)     All other components within normal limits    Narrative:     Performed at:  [address] [facility] [address]   Phone [phone]   BASIC METABOLIC PANEL W/ REFLEX TO MG FOR LOW K - Abnormal; Notable for the following components:    Sodium 134 (*)     Potassium reflex Magnesium 3.1 (*)     Chloride 97 (*)     Glucose 167 (*)     BUN 42 (*)     CREATININE 2.0 (*)     GFR Non- 27 (*)     GFR  33 (*)     All other components within normal limits    Narrative:     Performed at:  [address] [place] [address]   Phone [phone]   MAGNESIUM - Abnormal; Notable for the following components:    Magnesium 1.60 (*)     All other components within normal limits    Narrative:     Performed at:  [address] [facility] [place] [address]   Phone [phone]   CULTURE, URINE   PREGNANCY, URINE    Narrative:     Performed at:  [address] [facility] [place] [address]   Phone [phone]   TROPONIN    Narrative:     Performed at:  [facility]  [address] [place] [address] [place] [address]   Phone [phone]       All other labs were within normal range or not returned as of this dictation.     EMERGENCY DEPARTMENT COURSE and DIFFERENTIAL DIAGNOSIS/MDM:   Vitals:    Vitals:    08/23/20 0952 08/23/20 1102 08/23/20 1149   BP: 129/73 102/67 105/66   Pulse: 84 76 71   Resp: 22 15 15   Temp: 98.6 °F (37 °C)  98.7 °F (37.1 °C)   TempSrc: Oral  Oral   SpO2: 100% 98% 98%   Weight: 278 lb 10.6 oz (126.4 kg)     Height: 5' 7\" (1.702 m)         The patient's condition in the ED was good, the patient was afebrile and nontoxic in appearance, and the patient's physical exam was unremarkable. Vital signs normal in the ED. She was given IV fluids. Work-up showed the patient still has a UTI, with both bacteria and yeast, her creatinine is 2.0 with a GFR of 33, her potassium is 3.1, glucose is 167. H&H is 10.6/30.9. Despite her diabetes diagnosis, the patient's home medications only include antihypertensives and the ciprofloxacin she was just prescribed. Patient benefit from hospital admission for observation and further care. I consulted the hospitalist, who agreed to accept and admit the patient. Patient was given IV Rocephin and potassium replacement in the ED, along with oral Diflucan. PROCEDURES:  None    FINAL IMPRESSION      1. Urinary tract infection with hematuria, site unspecified    2. MIN (acute kidney injury) ([facility] [address]    3. Hypokalemia    4. Anemia, unspecified type    5. Type 2 diabetes mellitus with other specified complication, without long-term current use of insulin ([facility] [address]          DISPOSITION/PLAN   DISPOSITION Decision To Admit 08/23/2020 11:51:52 AM      PATIENT REFERRED TO:  No follow-up provider specified.     DISCHARGE MEDICATIONS:  New Prescriptions    No medications on file       (Please note that portions of this note were completed with a voice recognition program.  Efforts were made to edit the dictations but occasionally words are mis-transcribed.)    Maurice Manzano, [address] [place], [place]  08/23/20 6961

## 2020-08-24 LAB
ALBUMIN SERPL-MCNC: 3.2 G/DL (ref 3.4–5)
ANION GAP SERPL CALCULATED.3IONS-SCNC: 13 MMOL/L (ref 3–16)
BASOPHILS ABSOLUTE: 0 K/UL (ref 0–0.2)
BASOPHILS RELATIVE PERCENT: 0.5 %
BUN BLDV-MCNC: 35 MG/DL (ref 7–20)
CALCIUM SERPL-MCNC: 9.8 MG/DL (ref 8.3–10.6)
CHLORIDE BLD-SCNC: 103 MMOL/L (ref 99–110)
CO2: 22 MMOL/L (ref 21–32)
CREAT SERPL-MCNC: 1.5 MG/DL (ref 0.6–1.1)
CREATININE URINE: 86.7 MG/DL (ref 28–259)
EKG ATRIAL RATE: 79 BPM
EKG DIAGNOSIS: NORMAL
EKG P AXIS: 51 DEGREES
EKG P-R INTERVAL: 180 MS
EKG Q-T INTERVAL: 380 MS
EKG QRS DURATION: 78 MS
EKG QTC CALCULATION (BAZETT): 435 MS
EKG R AXIS: 8 DEGREES
EKG T AXIS: 13 DEGREES
EKG VENTRICULAR RATE: 79 BPM
EOSINOPHILS ABSOLUTE: 0.1 K/UL (ref 0–0.6)
EOSINOPHILS RELATIVE PERCENT: 2.1 %
ESTIMATED AVERAGE GLUCOSE: 142.7 MG/DL
GFR AFRICAN AMERICAN: 46
GFR NON-AFRICAN AMERICAN: 38
GLUCOSE BLD-MCNC: 112 MG/DL (ref 70–99)
GLUCOSE BLD-MCNC: 115 MG/DL (ref 70–99)
GLUCOSE BLD-MCNC: 123 MG/DL (ref 70–99)
GLUCOSE BLD-MCNC: 124 MG/DL (ref 70–99)
GLUCOSE BLD-MCNC: 126 MG/DL (ref 70–99)
HBA1C MFR BLD: 6.6 %
HCT VFR BLD CALC: 28.5 % (ref 36–48)
HEMOGLOBIN: 9.9 G/DL (ref 12–16)
LYMPHOCYTES ABSOLUTE: 1.6 K/UL (ref 1–5.1)
LYMPHOCYTES RELATIVE PERCENT: 24.3 %
MAGNESIUM: 1.9 MG/DL (ref 1.8–2.4)
MCH RBC QN AUTO: 35.3 PG (ref 26–34)
MCHC RBC AUTO-ENTMCNC: 34.9 G/DL (ref 31–36)
MCV RBC AUTO: 101 FL (ref 80–100)
MICROALBUMIN UR-MCNC: 8.3 MG/DL
MICROALBUMIN/CREAT UR-RTO: 95.7 MG/G (ref 0–30)
MONOCYTES ABSOLUTE: 0.8 K/UL (ref 0–1.3)
MONOCYTES RELATIVE PERCENT: 11.3 %
NEUTROPHILS ABSOLUTE: 4.2 K/UL (ref 1.7–7.7)
NEUTROPHILS RELATIVE PERCENT: 61.8 %
PDW BLD-RTO: 13.8 % (ref 12.4–15.4)
PERFORMED ON: ABNORMAL
PHOSPHORUS: 2.7 MG/DL (ref 2.5–4.9)
PLATELET # BLD: 270 K/UL (ref 135–450)
PMV BLD AUTO: 8.7 FL (ref 5–10.5)
POTASSIUM REFLEX MAGNESIUM: 3.2 MMOL/L (ref 3.5–5.1)
POTASSIUM SERPL-SCNC: 3.2 MMOL/L (ref 3.5–5.1)
RBC # BLD: 2.82 M/UL (ref 4–5.2)
SODIUM BLD-SCNC: 138 MMOL/L (ref 136–145)
URINE CULTURE, ROUTINE: NORMAL
WBC # BLD: 6.8 K/UL (ref 4–11)

## 2020-08-24 PROCEDURE — 6360000002 HC RX W HCPCS: Performed by: INTERNAL MEDICINE

## 2020-08-24 PROCEDURE — 36415 COLL VENOUS BLD VENIPUNCTURE: CPT

## 2020-08-24 PROCEDURE — 93010 ELECTROCARDIOGRAM REPORT: CPT | Performed by: INTERNAL MEDICINE

## 2020-08-24 PROCEDURE — G0378 HOSPITAL OBSERVATION PER HR: HCPCS

## 2020-08-24 PROCEDURE — 94640 AIRWAY INHALATION TREATMENT: CPT

## 2020-08-24 PROCEDURE — 96367 TX/PROPH/DG ADDL SEQ IV INF: CPT

## 2020-08-24 PROCEDURE — 6370000000 HC RX 637 (ALT 250 FOR IP): Performed by: INTERNAL MEDICINE

## 2020-08-24 PROCEDURE — 83735 ASSAY OF MAGNESIUM: CPT

## 2020-08-24 PROCEDURE — 2060000000 HC ICU INTERMEDIATE R&B

## 2020-08-24 PROCEDURE — 80069 RENAL FUNCTION PANEL: CPT

## 2020-08-24 PROCEDURE — 94761 N-INVAS EAR/PLS OXIMETRY MLT: CPT

## 2020-08-24 PROCEDURE — 82570 ASSAY OF URINE CREATININE: CPT

## 2020-08-24 PROCEDURE — 96376 TX/PRO/DX INJ SAME DRUG ADON: CPT

## 2020-08-24 PROCEDURE — 82043 UR ALBUMIN QUANTITATIVE: CPT

## 2020-08-24 PROCEDURE — 96372 THER/PROPH/DIAG INJ SC/IM: CPT

## 2020-08-24 PROCEDURE — 87040 BLOOD CULTURE FOR BACTERIA: CPT

## 2020-08-24 PROCEDURE — 51798 US URINE CAPACITY MEASURE: CPT

## 2020-08-24 PROCEDURE — 2580000003 HC RX 258: Performed by: INTERNAL MEDICINE

## 2020-08-24 PROCEDURE — 85025 COMPLETE CBC W/AUTO DIFF WBC: CPT

## 2020-08-24 RX ORDER — POTASSIUM CHLORIDE 750 MG/1
40 TABLET, FILM COATED, EXTENDED RELEASE ORAL ONCE
Status: COMPLETED | OUTPATIENT
Start: 2020-08-24 | End: 2020-08-24

## 2020-08-24 RX ORDER — POTASSIUM CHLORIDE 750 MG/1
40 TABLET, FILM COATED, EXTENDED RELEASE ORAL DAILY
Status: DISCONTINUED | OUTPATIENT
Start: 2020-08-25 | End: 2020-08-25 | Stop reason: HOSPADM

## 2020-08-24 RX ADMIN — IRON SUCROSE 200 MG: 20 INJECTION, SOLUTION INTRAVENOUS at 21:21

## 2020-08-24 RX ADMIN — LEVOTHYROXINE SODIUM 175 MCG: 0.12 TABLET ORAL at 08:29

## 2020-08-24 RX ADMIN — CEFTRIAXONE 1 G: 1 INJECTION, POWDER, FOR SOLUTION INTRAMUSCULAR; INTRAVENOUS at 13:04

## 2020-08-24 RX ADMIN — BUDESONIDE AND FORMOTEROL FUMARATE DIHYDRATE 2 PUFF: 160; 4.5 AEROSOL RESPIRATORY (INHALATION) at 21:18

## 2020-08-24 RX ADMIN — IRON SUCROSE 200 MG: 20 INJECTION, SOLUTION INTRAVENOUS at 08:35

## 2020-08-24 RX ADMIN — SODIUM CHLORIDE, POTASSIUM CHLORIDE, SODIUM LACTATE AND CALCIUM CHLORIDE: 600; 310; 30; 20 INJECTION, SOLUTION INTRAVENOUS at 18:00

## 2020-08-24 RX ADMIN — CLONIDINE HYDROCHLORIDE 0.2 MG: 0.1 TABLET ORAL at 08:34

## 2020-08-24 RX ADMIN — Medication 10 ML: at 08:35

## 2020-08-24 RX ADMIN — BUDESONIDE AND FORMOTEROL FUMARATE DIHYDRATE 2 PUFF: 160; 4.5 AEROSOL RESPIRATORY (INHALATION) at 08:26

## 2020-08-24 RX ADMIN — CLONIDINE HYDROCHLORIDE 0.2 MG: 0.1 TABLET ORAL at 21:21

## 2020-08-24 RX ADMIN — ENOXAPARIN SODIUM 40 MG: 40 INJECTION SUBCUTANEOUS at 08:34

## 2020-08-24 RX ADMIN — SODIUM CHLORIDE, POTASSIUM CHLORIDE, SODIUM LACTATE AND CALCIUM CHLORIDE: 600; 310; 30; 20 INJECTION, SOLUTION INTRAVENOUS at 04:10

## 2020-08-24 RX ADMIN — Medication 1 TABLET: at 13:11

## 2020-08-24 RX ADMIN — ACETAMINOPHEN 650 MG: 325 TABLET ORAL at 19:33

## 2020-08-24 RX ADMIN — POTASSIUM CHLORIDE 40 MEQ: 750 TABLET, FILM COATED, EXTENDED RELEASE ORAL at 13:11

## 2020-08-24 ASSESSMENT — PAIN SCALES - GENERAL
PAINLEVEL_OUTOF10: 0
PAINLEVEL_OUTOF10: 0
PAINLEVEL_OUTOF10: 3
PAINLEVEL_OUTOF10: 0
PAINLEVEL_OUTOF10: 3

## 2020-08-24 ASSESSMENT — PAIN DESCRIPTION - FREQUENCY: FREQUENCY: INTERMITTENT

## 2020-08-24 ASSESSMENT — PAIN - FUNCTIONAL ASSESSMENT: PAIN_FUNCTIONAL_ASSESSMENT: PREVENTS OR INTERFERES SOME ACTIVE ACTIVITIES AND ADLS

## 2020-08-24 ASSESSMENT — PAIN DESCRIPTION - PAIN TYPE: TYPE: ACUTE PAIN

## 2020-08-24 ASSESSMENT — PAIN DESCRIPTION - LOCATION: LOCATION: HEAD

## 2020-08-24 ASSESSMENT — PAIN DESCRIPTION - ORIENTATION: ORIENTATION: MID

## 2020-08-24 ASSESSMENT — PAIN DESCRIPTION - PROGRESSION: CLINICAL_PROGRESSION: GRADUALLY IMPROVING

## 2020-08-24 ASSESSMENT — PAIN DESCRIPTION - ONSET: ONSET: ON-GOING

## 2020-08-24 ASSESSMENT — PAIN DESCRIPTION - DESCRIPTORS: DESCRIPTORS: THROBBING

## 2020-08-24 NOTE — CONSULTS
Patient Name: Corona Aviles                                                    Primary Physician: Dominick Yates MD  Admitting Dx: MIN (acute kidney injury) ([facility] [N17.9]                               [facility]                   Nephrology Inpatient  Consult    HPI:  This is a consult for Corona Aviles  requested by Dominick Yates MD for the reason of  MIN  Eval    The pt is a 39 y.o. AA F with PMHx of HTn, HLD, obesity, DM, asthma and some proteinuria. Not aware of any previous MIN / CKD hx. Tell me that has been not feeling well for last few days. Also poor po intake, noticed dark urine color last few days . Generalized aches nad pains, has been taking ibuprofen 600 mg twice daily for last few days. Also on diuretics. No pedal edema. Was seen in the ER, vitally stable but with lowish BP, also MIN, crea 2.0, seems likel baseline is   1.2, came in at 2.0, now down to 1.5 with hydration   Says feeling a 100 percent better. Past Medical History:   Diagnosis Date    Asthma     Diabetes mellitus ([facility]     Hyperlipidemia     Hypertension        History reviewed. No pertinent surgical history.     Social History     Socioeconomic History    Marital status:      Spouse name: Not on file    Number of children: Not on file    Years of education: Not on file    Highest education level: Not on file   Occupational History    Not on file   Social Needs    Financial resource strain: Not on file    Food insecurity     Worry: Not on file     Inability: Not on file    Transportation needs     Medical: Not on file     Non-medical: Not on file   Tobacco Use    Smoking status: Current Every Day Smoker     Packs/day: 0.50     Types: Cigarettes    Smokeless tobacco: Never Used   Substance and Sexual Activity    Alcohol use: Yes     Comment: social    Drug use: Yes     Comment: occasional    Sexual activity: Not on file   Lifestyle    Physical activity     Days per week: Not on file Minutes per session: Not on file    Stress: Not on file   Relationships    Social connections     Talks on phone: Not on file     Gets together: Not on file     Attends [employer] service: Not on file     Active member of club or organization: Not on file     Attends meetings of clubs or organizations: Not on file     Relationship status: Not on file    Intimate partner violence     Fear of current or ex partner: Not on file     Emotionally abused: Not on file     Physically abused: Not on file     Forced sexual activity: Not on file   Other Topics Concern    Not on file   Social History Narrative    Not on file       History reviewed. No pertinent family history.       folic acid-pyridoxine-cyanocobalamine, 1 tablet, Daily  cefTRIAXone (ROCEPHIN) IV, 1 g, Q24H  insulin lispro, 0-6 Units, TID WC  insulin lispro, 0-3 Units, Nightly  budesonide-formoterol, 2 puff, BID  levothyroxine, 175 mcg, Daily  sodium chloride flush, 10 mL, 2 times per day  enoxaparin, 40 mg, Daily  cloNIDine, 0.2 mg, BID  iron sucrose, 200 mg, Q24H       dextrose  lactated ringers, Last Rate: 100 mL/hr at 08/24/20 0410       glucose, 15 g, PRN  dextrose, 12.5 g, PRN  glucagon (rDNA), 1 mg, PRN  dextrose, 100 mL/hr, PRN  sodium chloride flush, 10 mL, PRN  acetaminophen, 650 mg, Q6H PRN    Or  acetaminophen, 650 mg, Q6H PRN  polyethylene glycol, 17 g, Daily PRN  promethazine, 12.5 mg, Q6H PRN    Or  ondansetron, 4 mg, Q6H PRN        Penicillins    DIET CARB CONTROL;    REVIEW OF SYSTEMS:   POSITIVE ITEMS IN BOLD:  GEN:  fevers, chills, sweats, fatigue and weight loss   EYE: eyelid swelling, no redness  ENT:   nasal congestion, sore mouth and sore throat   Resp:   cough, hemoptysis, pneumonia or dyspnea on exertion   Card: chest pain, chest pressure/discomfort, dyspnea, palpitations,  lower extremity edema   GI: nausea, vomiting, diarrhea, constipation and abdominal pain   :  dysuria, nocturia, urinary incontinence, hesitancy and hematuria Derm:  rash, skin lesion(s), pruritus and dryness   Neuro:  headaches, dizziness, seizures, gait problems, tremor and weakness   MS:  myalgias, arthralgias, neck pain and back pain     Physical Examination:  Vitals:  Reviewed. Vitals:    08/24/20 0400 08/24/20 0826 08/24/20 0830 08/24/20 1315   BP: 108/68  (!) 140/90 (!) 140/86   Pulse: 79  99 99   Resp: 16  16 16   Temp: 98.7 °F (37.1 °C)  98.4 °F (36.9 °C) 97.7 °F (36.5 °C)   TempSrc: Oral  Oral Oral   SpO2: 100% 100% 100% 100%   Weight: 278 lb 7.1 oz (126.3 kg)      Height:           Gen appearance: Alert, appears stated age and cooperative   Eyes: Eyelids,conjunctiva and pupils look normal   ENT: External inspection of the ears and nose are within normal limits             Oral mucosa  Is moist   Neuro: Grossly no focal neurological deficits, normal sensation, grossly cranial nerves intact   Neck:  No JVD, no mass, no thyroid enlargement   Cardio: S1 S2 normal, No added sounds   Resp: normal effort, clear to auscultation     GI:  Soft, non-tender, BS +          No palpable kidney, no renal angle tenderness   MS: No swollen or tender joints, no cyanosis, clubbing   DERM: no rashes, thickening   EDEMA: no edema. I/Os:          Intake/Output Summary (Last 24 hours) at 8/24/2020 1517  Last data filed at 8/24/2020 0730  Gross per 24 hour   Intake 240 ml   Output 500 ml   Net -260 ml        No intake/output data recorded. I/O last 3 completed shifts:   In: 240 [P.O.:240]  Out: 500 [Urine:500]    LABS:    CBC:   Recent Labs     08/23/20  1051 08/24/20  0555   WBC 8.2 6.8   HGB 10.6* 9.9*   HCT 30.9* 28.5*    270     BMP:    Recent Labs     08/23/20  1051  08/23/20  1759 08/24/20  0555   *  --   --  138   K 3.1*   < > 3.2* 3.2*  3.2*   CL 97*  --   --  103   CO2 22  --   --  22   BUN 42*  --   --  35*   CREATININE 2.0*  --   --  1.5*   GLUCOSE 167*  --   --  112*   MG 1.60*  --   --  1.90   PHOS  --   --   --  2.7    < > = values in this

## 2020-08-24 NOTE — PROGRESS NOTES
Patient with borderline blood pressures, fever 101.6.   Check Blood Cx  Dc Amlodipine  Attempted to call RN, no response

## 2020-08-24 NOTE — PROGRESS NOTES
Hospitalist Progress Note      PCP: [id number] [place] [address]    Date of Admission: 8/23/2020        Subjective: feels ok, no fever or chills for now. Medications:  Reviewed    Infusion Medications    dextrose      lactated ringers 100 mL/hr at 08/24/20 0410     Scheduled Medications    potassium chloride  40 mEq Oral Once    cefTRIAXone (ROCEPHIN) IV  1 g Intravenous Q24H    insulin lispro  0-6 Units Subcutaneous TID WC    insulin lispro  0-3 Units Subcutaneous Nightly    budesonide-formoterol  2 puff Inhalation BID    levothyroxine  175 mcg Oral Daily    sodium chloride flush  10 mL Intravenous 2 times per day    enoxaparin  40 mg Subcutaneous Daily    cloNIDine  0.2 mg Oral BID    iron sucrose  200 mg Intravenous Q24H     PRN Meds: glucose, dextrose, glucagon (rDNA), dextrose, sodium chloride flush, acetaminophen **OR** acetaminophen, polyethylene glycol, promethazine **OR** ondansetron      Intake/Output Summary (Last 24 hours) at 8/24/2020 1027  Last data filed at 8/24/2020 0730  Gross per 24 hour   Intake 240 ml   Output 500 ml   Net -260 ml       Physical Exam Performed:    BP (!) 140/90   Pulse 99   Temp 98.4 °F (36.9 °C) (Oral)   Resp 16   Ht 5' 7\" (1.702 m)   Wt 278 lb 7.1 oz (126.3 kg)   LMP 08/22/2020   SpO2 100%   BMI 43.61 kg/m²     General appearance: No apparent distress  Neck: Supple  Respiratory:  Normal respiratory effort. Clear to auscultation, bilaterally without Rales/Wheezes/Rhonchi. Cardiovascular: Regular rate and rhythm with normal S1/S2 without murmurs, rubs or gallops. Abdomen: Soft, non-tender, non-distended, obese. Musculoskeletal: No clubbing, cyanosis  Skin: Skin color, texture, turgor normal.  No rashes or lesions.   Neurologic:  No focal weakness   Psychiatric: Alert and oriented  Capillary Refill: Brisk,< 3 seconds   Peripheral Pulses: +2 palpable, equal bilaterally       Labs:   Recent Labs     08/23/20  1051 08/24/20  0555   WBC 8.2 6.8   HGB 10.6* 9.9*   HCT 30.9* 28.5*    270     Recent Labs     08/23/20  1051  08/23/20  1759 08/24/20  0555   *  --   --  138   K 3.1*   < > 3.2* 3.2*  3.2*   CL 97*  --   --  103   CO2 22  --   --  22   BUN 42*  --   --  35*   CREATININE 2.0*  --   --  1.5*   CALCIUM 10.5  --   --  9.8   PHOS  --   --   --  2.7    < > = values in this interval not displayed. Recent Labs     08/23/20  1051   AST 31   ALT 16   BILIDIR <0.2   BILITOT 0.4   ALKPHOS 81     No results for input(s): INR in the last 72 hours. Recent Labs     08/23/20  1051   CKTOTAL 184   TROPONINI <0.01       Urinalysis:      Lab Results   Component Value Date    NITRU Negative 08/23/2020    WBCUA 22 08/23/2020    BACTERIA Rare 08/23/2020    RBCUA 16 08/23/2020    BLOODU MODERATE 08/23/2020    SPECGRAV 1.014 08/23/2020    GLUCOSEU Negative 08/23/2020       Radiology:  CT HEAD WO CONTRAST   Final Result   No acute intracranial abnormality. XR CHEST PORTABLE   Final Result   No active cardiopulmonary disease                 Assessment/Plan:    Active Hospital Problems    Diagnosis    MIN [facility]) ([facility] [N17.9]    Headache [R51]    Hypokalemia [E87.6]    Hyponatremia [E87.1]    Morbid (severe) obesity due to excess calories ([facility] [E66.01]    Anemia [D64.9]    Type 2 diabetes mellitus ([facility] [E11.9]    Postoperative hypothyroidism [E89.0]     1. Possible Sepsis, tachypnea and tachycardia along with UTI, IV antibiotics, clinically better. 2. UTI, IV antibiotics, awaiting culture results. 3. Acute kidney injury, suspect possibly prerenal with decreased p.o. intake x2 days, she is on ibuprofen and hydrochlorothiazide at home. Improved now. 4. Anemia, appears of chronic disease with decreased iron, saturation and TIBC, ferritin is mildly elevated likely as acute inflammatory marker, FA and B12 borderline low. Will add FA, B12, will consult hematology for recommendations.    5. Type 2 diabetes non-insulin-dependent, she was on metformin, hold for now po meds. 6. Headache, improved  7. Generalized weakness, improved some. 8. Postoperative Hypothyroidism, continue home synthroid  9. Morbid obesity due to excess calories Body mass index is 43.38 kg/m². - Complicating assessment and treatment. Placing patient at risk for multiple co-morbidities as well as early death and contributing to the patient's presentation.  on weight loss when appropriate.     Diet: DIET CARB CONTROL;  Code Status: Full Code        Kraig Higuera MD

## 2020-08-25 VITALS
OXYGEN SATURATION: 92 % | RESPIRATION RATE: 18 BRPM | TEMPERATURE: 98.2 F | SYSTOLIC BLOOD PRESSURE: 138 MMHG | DIASTOLIC BLOOD PRESSURE: 87 MMHG | HEIGHT: 67 IN | WEIGHT: 278.66 LBS | HEART RATE: 80 BPM | BODY MASS INDEX: 43.74 KG/M2

## 2020-08-25 LAB
ALBUMIN SERPL-MCNC: 3.6 G/DL (ref 3.4–5)
ANION GAP SERPL CALCULATED.3IONS-SCNC: 11 MMOL/L (ref 3–16)
ANION GAP SERPL CALCULATED.3IONS-SCNC: 12 MMOL/L (ref 3–16)
BASOPHILS ABSOLUTE: 0.1 K/UL (ref 0–0.2)
BASOPHILS RELATIVE PERCENT: 0.5 %
BUN BLDV-MCNC: 22 MG/DL (ref 7–20)
BUN BLDV-MCNC: 22 MG/DL (ref 7–20)
CALCIUM SERPL-MCNC: 10.4 MG/DL (ref 8.3–10.6)
CALCIUM SERPL-MCNC: 10.6 MG/DL (ref 8.3–10.6)
CHLORIDE BLD-SCNC: 101 MMOL/L (ref 99–110)
CHLORIDE BLD-SCNC: 104 MMOL/L (ref 99–110)
CO2: 23 MMOL/L (ref 21–32)
CO2: 24 MMOL/L (ref 21–32)
CREAT SERPL-MCNC: 1.4 MG/DL (ref 0.6–1.1)
CREAT SERPL-MCNC: 1.6 MG/DL (ref 0.6–1.1)
EOSINOPHILS ABSOLUTE: 0.1 K/UL (ref 0–0.6)
EOSINOPHILS RELATIVE PERCENT: 1.2 %
FERRITIN: 726.1 NG/ML (ref 15–150)
GFR AFRICAN AMERICAN: 43
GFR AFRICAN AMERICAN: 50
GFR NON-AFRICAN AMERICAN: 35
GFR NON-AFRICAN AMERICAN: 41
GLUCOSE BLD-MCNC: 128 MG/DL (ref 70–99)
GLUCOSE BLD-MCNC: 135 MG/DL (ref 70–99)
GLUCOSE BLD-MCNC: 136 MG/DL (ref 70–99)
GLUCOSE BLD-MCNC: 151 MG/DL (ref 70–99)
HAPTOGLOBIN: 404 MG/DL (ref 30–200)
HCT VFR BLD CALC: 29.7 % (ref 36–48)
HEMOGLOBIN: 10.4 G/DL (ref 12–16)
IRON SATURATION: 75 % (ref 15–50)
IRON: 166 UG/DL (ref 37–145)
LYMPHOCYTES ABSOLUTE: 1.7 K/UL (ref 1–5.1)
LYMPHOCYTES RELATIVE PERCENT: 16.3 %
MCH RBC QN AUTO: 34.8 PG (ref 26–34)
MCHC RBC AUTO-ENTMCNC: 34.9 G/DL (ref 31–36)
MCV RBC AUTO: 99.7 FL (ref 80–100)
MONOCYTES ABSOLUTE: 1 K/UL (ref 0–1.3)
MONOCYTES RELATIVE PERCENT: 9.3 %
NEUTROPHILS ABSOLUTE: 7.5 K/UL (ref 1.7–7.7)
NEUTROPHILS RELATIVE PERCENT: 72.7 %
PDW BLD-RTO: 13.9 % (ref 12.4–15.4)
PERFORMED ON: ABNORMAL
PERFORMED ON: ABNORMAL
PHOSPHORUS: 2.1 MG/DL (ref 2.5–4.9)
PLATELET # BLD: 341 K/UL (ref 135–450)
PMV BLD AUTO: 8.5 FL (ref 5–10.5)
POTASSIUM SERPL-SCNC: 3.7 MMOL/L (ref 3.5–5.1)
POTASSIUM SERPL-SCNC: 3.9 MMOL/L (ref 3.5–5.1)
RBC # BLD: 2.98 M/UL (ref 4–5.2)
SODIUM BLD-SCNC: 135 MMOL/L (ref 136–145)
SODIUM BLD-SCNC: 140 MMOL/L (ref 136–145)
TOTAL IRON BINDING CAPACITY: 222 UG/DL (ref 260–445)
WBC # BLD: 10.4 K/UL (ref 4–11)

## 2020-08-25 PROCEDURE — 84165 PROTEIN E-PHORESIS SERUM: CPT

## 2020-08-25 PROCEDURE — 96372 THER/PROPH/DIAG INJ SC/IM: CPT

## 2020-08-25 PROCEDURE — 80069 RENAL FUNCTION PANEL: CPT

## 2020-08-25 PROCEDURE — 82728 ASSAY OF FERRITIN: CPT

## 2020-08-25 PROCEDURE — 6360000002 HC RX W HCPCS: Performed by: INTERNAL MEDICINE

## 2020-08-25 PROCEDURE — 85025 COMPLETE CBC W/AUTO DIFF WBC: CPT

## 2020-08-25 PROCEDURE — 83010 ASSAY OF HAPTOGLOBIN QUANT: CPT

## 2020-08-25 PROCEDURE — 6370000000 HC RX 637 (ALT 250 FOR IP): Performed by: INTERNAL MEDICINE

## 2020-08-25 PROCEDURE — 83540 ASSAY OF IRON: CPT

## 2020-08-25 PROCEDURE — 2580000003 HC RX 258: Performed by: INTERNAL MEDICINE

## 2020-08-25 PROCEDURE — 36415 COLL VENOUS BLD VENIPUNCTURE: CPT

## 2020-08-25 PROCEDURE — 84155 ASSAY OF PROTEIN SERUM: CPT

## 2020-08-25 PROCEDURE — 94760 N-INVAS EAR/PLS OXIMETRY 1: CPT

## 2020-08-25 PROCEDURE — 83550 IRON BINDING TEST: CPT

## 2020-08-25 PROCEDURE — G0378 HOSPITAL OBSERVATION PER HR: HCPCS

## 2020-08-25 PROCEDURE — 96366 THER/PROPH/DIAG IV INF ADDON: CPT

## 2020-08-25 RX ORDER — CEFDINIR 300 MG/1
300 CAPSULE ORAL 2 TIMES DAILY
Qty: 6 CAPSULE | Refills: 0 | Status: SHIPPED | OUTPATIENT
Start: 2020-08-25 | End: 2020-08-28

## 2020-08-25 RX ORDER — CEFDINIR 300 MG/1
300 CAPSULE ORAL 2 TIMES DAILY
Qty: 6 CAPSULE | Refills: 0 | Status: SHIPPED | OUTPATIENT
Start: 2020-08-25 | End: 2020-08-25 | Stop reason: SDUPTHER

## 2020-08-25 RX ADMIN — Medication 10 ML: at 08:34

## 2020-08-25 RX ADMIN — INSULIN LISPRO 1 UNITS: 100 INJECTION, SOLUTION INTRAVENOUS; SUBCUTANEOUS at 08:33

## 2020-08-25 RX ADMIN — Medication 1 TABLET: at 08:33

## 2020-08-25 RX ADMIN — POTASSIUM CHLORIDE 40 MEQ: 750 TABLET, FILM COATED, EXTENDED RELEASE ORAL at 08:33

## 2020-08-25 RX ADMIN — ENOXAPARIN SODIUM 40 MG: 40 INJECTION SUBCUTANEOUS at 08:33

## 2020-08-25 RX ADMIN — SODIUM CHLORIDE, POTASSIUM CHLORIDE, SODIUM LACTATE AND CALCIUM CHLORIDE: 600; 310; 30; 20 INJECTION, SOLUTION INTRAVENOUS at 04:28

## 2020-08-25 RX ADMIN — CEFTRIAXONE 1 G: 1 INJECTION, POWDER, FOR SOLUTION INTRAMUSCULAR; INTRAVENOUS at 12:07

## 2020-08-25 RX ADMIN — LEVOTHYROXINE SODIUM 175 MCG: 0.12 TABLET ORAL at 05:04

## 2020-08-25 RX ADMIN — CLONIDINE HYDROCHLORIDE 0.2 MG: 0.1 TABLET ORAL at 08:33

## 2020-08-25 ASSESSMENT — PAIN SCALES - GENERAL: PAINLEVEL_OUTOF10: 0

## 2020-08-25 NOTE — DISCHARGE SUMMARY
Hospital Medicine Discharge Summary    Patient ID: Macy Bowers      Patient's PCP: [address] Date: 8/23/2020     Discharge Date:   08/25/2020    Admitting Physician: Belen Clemente MD     Discharge Physician: Elliott Kessler MD     Discharge Diagnoses: Active Hospital Problems    Diagnosis    MIN (acute kidney injury) ([facility] [N17.9]    Headache [R51]    Hypokalemia [E87.6]    Hyponatremia [E87.1]    Morbid (severe) obesity due to excess calories ([facility] [E66.01]    Anemia [D64.9]    Type 2 diabetes mellitus ([facility] [E11.9]    Postoperative hypothyroidism [E89.0]       The patient was seen and examined on day of discharge and this discharge summary is in conjunction with any daily progress note from day of discharge. Hospital Course:     From HPI:\"39year-old morbidly obese female with a history of hypertension diabetes asthma hyperlipidemia presents to the ED with complains of not feeling well, generalized body aches x2 days, and onset of headache this morning. She says she feels generalized weakness. She has some mild cough but no shortness of breath. She has had minimal p.o. intake since Friday as has no appetite, denies nausea vomiting abdominal pain. Denies fever chills or any sick contacts. Denies any exposure to COVID-19.     On arrival to the ED she is afebrile, heart rate 70s to 80s  Respiratory rate 22, blood pressure 129/73, she is 98 200% on room air. Labs were significant for sodium 134, potassium 3.1, BUN of 1242, creatinine of 2.0, mag of 1.6, liver biochemistry normal, hemoglobin 10.6, .4. She says she is a current every day half pack per day smoker drinks socially. Denies any illicit drug use. Patient being admitted for management of acute kidney injury, electrolyte normalities, generalized weakness, with some concern for coronavirus infection. \"    1.  Possible Sepsis, tachypnea and tachycardia along with UTI, IV antibiotics, clinically better. Changing ceftriaxone to cefdinir upon discharge, patient listed allergy for pen, she tolerated ceftriaxone well, expecting same with omnicef. 2. UTI, IV antibiotics, change to cefdinir, culture negative. 3. Acute kidney injury, suspect possibly prerenal with decreased p.o. intake x2 days, better now. Nephrology ok to discharge. 4. Anemia, appears of chronic disease with decreased iron, saturation and TIBC, ferritin is mildly elevated likely as acute inflammatory marker, FA and B12 borderline low. Added supplements, discussed with hematology, follow up as outpatient, discussed with patient she verbalized understanding and agreement. 5. Type 2 diabetes non-insulin-dependent, she was on metformin. 6. Headache, improved  7. Generalized weakness, improved some. 8. Postoperative Hypothyroidism, continue home synthroid  9. Morbid obesity due to excess calories Body mass index is 49.67 kg/m². - Complicating assessment and treatment. Placing patient at risk for multiple co-morbidities as well as early death and contributing to the patient's presentation.  on weight loss when appropriate. Physical Exam Performed:     /87   Pulse 80   Temp 98.2 °F (36.8 °C) (Oral)   Resp 18   Ht 5' 7\" (1.702 m)   Wt 278 lb 10.6 oz (126.4 kg)   LMP 08/22/2020   SpO2 92%   BMI 43.64 kg/m²       General appearance:  No apparent distress  HEENT:  Normal cephalic  Neck: Supple  Respiratory:  Normal respiratory effort. Clear to auscultation, bilaterally without Rales/Wheezes/Rhonchi. Cardiovascular:  Regular rate and rhythm with normal S1/S2 without murmurs, rubs or gallops. Abdomen: Soft, non-tender  Musculoskeletal:  No clubbing, cyanosis   Skin: Skin color, texture, turgor normal.  No rashes or lesions. Neurologic:  No focal weakness   Psychiatric:  Alert and oriented  Capillary Refill: Brisk,< 3 seconds   Peripheral Pulses: +2 palpable, equal bilaterally       Labs:  For convenience and continuity at follow-up the following most recent labs are provided:      CBC:    Lab Results   Component Value Date    WBC 10.4 08/25/2020    HGB 10.4 08/25/2020    HCT 29.7 08/25/2020     08/25/2020       Renal:    Lab Results   Component Value Date     08/25/2020     08/25/2020    K 3.7 08/25/2020    K 3.9 08/25/2020    K 3.2 08/24/2020     08/25/2020     08/25/2020    CO2 23 08/25/2020    CO2 24 08/25/2020    BUN 22 08/25/2020    BUN 22 08/25/2020    CREATININE 1.4 08/25/2020    CREATININE 1.6 08/25/2020    CALCIUM 10.4 08/25/2020    CALCIUM 10.6 08/25/2020    PHOS 2.1 08/25/2020         Significant Diagnostic Studies    Radiology:   CT HEAD WO CONTRAST   Final Result   No acute intracranial abnormality. XR CHEST PORTABLE   Final Result   No active cardiopulmonary disease                Consults:     IP CONSULT TO NEPHROLOGY  IP CONSULT TO HEM/ONC    Disposition:  home     Condition at Discharge: Stable    Discharge Instructions/Follow-up:  PCP, Hematology, nephrology    Code Status:  Full Code     Activity: activity as tolerated    Diet: diabetic diet      Discharge Medications:     Current Discharge Medication List           Details   folic acid-pyridoxine-cyanocobalamine (FOLTX) 2.5-25-1 MG TABS tablet Take 1 tablet by mouth daily  Qty: 30 tablet, Refills: 0              Details   cloNIDine (CATAPRES) 0.2 MG tablet Take 0.2 mg by mouth 2 times daily      amLODIPine (NORVASC) 5 MG tablet Take 10 mg by mouth daily      levothyroxine (SYNTHROID) 175 MCG tablet Take 175 mcg by mouth daily      budesonide-formoterol (SYMBICORT) 160-4.5 MCG/ACT AERO Inhale 2 puffs into the lungs 2 times daily             Time Spent on discharge is more than 45 minutes in the examination, evaluation, counseling and review of medications and discharge plan.       Signed:    Soren Betancourt MD   8/25/2020      Thank you [address] [place] [address] for the opportunity to be involved in this patient's care. If you have any questions or concerns please feel free to contact me at [phone].

## 2020-08-25 NOTE — PROGRESS NOTES
Discharge orders acknowledged by RN . Discharge teaching completed with pt and family. AVS reviewed and all questions answered. New prescriptions and current medication regimen reviewed and pt understands schedule. Follow up appointments also reviewed with pt and resources given for discharge. Pt was given written prescriptions to be filled and understands schedule. IV removed. Telemonitor removed and returned to [facility]. . All other education completed. Required core measures completed. Pt vitals WDL. Pt discharged with all belongings to home with patient. Pt transported off of unit via wheelchair. No complications.

## 2020-08-25 NOTE — PROGRESS NOTES
bruising or ecchymoses, blood clots or swollen lymph nodes. · Allergic/Immunologic: No nasal congestion or hives. ·     Objective:     Patient Vitals for the past 8 hrs:   BP Temp Temp src Pulse Resp SpO2 Weight   08/25/20 0734 (!) 152/88 98.5 °F (36.9 °C) Oral 86 16 95 % --   08/25/20 0417 -- -- -- -- -- -- 278 lb 10.6 oz (126.4 kg)   08/25/20 0414 116/78 98.6 °F (37 °C) Oral 91 16 100 % --     I/O last 3 completed shifts: In: 3414.7 [P.O.:240; I.V.:3124.7; IV Piggyback:50]  Out: 500 [Urine:500]  No intake/output data recorded.     BP (!) 152/88   Pulse 86   Temp 98.5 °F (36.9 °C) (Oral)   Resp 16   Ht 5' 7\" (1.702 m)   Wt 278 lb 10.6 oz (126.4 kg)   LMP 08/22/2020   SpO2 95%   BMI 43.64 kg/m²     General Appearance:    Alert, cooperative, no distress, appears stated age   Head:    Normocephalic, without obvious abnormality, atraumatic   Eyes:    PERRL, conjunctiva/corneas clear, EOM's intact, fundi     benign, both eyes        Ears:    Normal TM's and external ear canals, both ears   Nose:   Nares normal, septum midline, mucosa normal, no drainage    or sinus tenderness   Throat:   Lips, mucosa, and tongue normal; teeth and gums normal   Neck:   Supple, symmetrical, trachea midline, no adenopathy;        thyroid:  No enlargement/tenderness/nodules; no carotid    bruit or JVD   Back:     Symmetric, no curvature, ROM normal, no CVA tenderness   Lungs:     Clear to auscultation bilaterally, respirations unlabored   Chest wall:    No tenderness or deformity   Heart:    Regular rate and rhythm, S1 and S2 normal, no murmur, rub   or gallop   Abdomen:     Soft, non-tender, bowel sounds active all four quadrants,     no masses, no organomegaly           Extremities:   Extremities normal, atraumatic, no cyanosis or edema   Pulses:   2+ and symmetric all extremities   Skin:   Skin color, texture, turgor normal, no rashes or lesions   Lymph nodes:   Cervical, supraclavicular, and axillary nodes normal Neurologic:   CNII-XII intact. Normal strength, sensation and reflexes       throughout         Data Review  CBC:   Lab Results   Component Value Date    WBC 10.4 08/25/2020    RBC 2.98 08/25/2020       Assessment:     Active Problems:    MIN (acute kidney injury) ([facility]    Headache    Hypokalemia    Hyponatremia    Morbid (severe) obesity due to excess calories (HCC)    Anemia    Type 2 diabetes mellitus (HCC)    Postoperative hypothyroidism  Resolved Problems:    * No resolved hospital problems. *      Plan:     1. Macrocytic anemia. Likely multifactorial and due to MIN and BM suppression from presumed viral syndrome. Some labs are pending. She should followup with me in 1 week. OK to discharge.         Electronically signed by Melvi Israel MD on 8/25/2020 at 11:30 AM

## 2020-08-25 NOTE — PROGRESS NOTES
CLINICAL PHARMACY NOTE: MEDS TO [facility] [address] Select Patient?: No  Total # of Prescriptions Filled: 1   The following medications were delivered to the patient:  Cefdinir 300 mg       Total # of Interventions Completed: 1  Time Spent (min): 15    Additional Documentation:  Foltx prescription sent to [facility].

## 2020-08-25 NOTE — CONSULTS
Hematology Consult    See dictation    A/P:  1. Macrocytic anemia. This is likely multifactorial.  Her irron studies consistent with chronic inflammation although iron deficiency cannot be totally ruled out. She also has MIN which is likely contributing. Will give a dose of IV iron. Will consider EPO as well. Her B12 and haptoglobin are pending If her Hb continues to fall, I may consider a BM biopsy. She also likely has an acute viral syndrome which could have caused BM suppression. Thanks for the consult. Will follow closely.     Uriel Raya MD

## 2020-08-25 NOTE — CONSULTS
[address] [address]                                  CONSULTATION    PATIENT NAME: Delma De Oliveira                       :        1975  MED REC NO:   [id number]                          ROOM:       5260  ACCOUNT NO:   [de-identified]                           ADMIT DATE: 2020  PROVIDER:     Art Sharif MD    HEMATOLOGY CONSULTATION    CONSULT DATE:  2020    REASON FOR CONSULTATION:  Anemia. CONSULTING PROVIDER:  Cheryl Love MD    HISTORY OF PRESENT ILLNESS:  The patient is a 44-year-old female with  history of diabetes and hypertension who presented to the hospital  yesterday with symptoms of not feeling well. She had generalized body  aches and generalized weakness as well as an acute headache. She had  minimal intake for the last few days due to nausea and vomiting and  abdominal pain. She denies any COVID-19 exposure and her initial screen  was negative. Her initial laboratory workup revealed a hemoglobin of  10.6 that fell to the 9 range with hydration. Her MCV was elevated at  101. She also had an elevated creatinine of 2.0. She is feeling better  with hydration. She denies any recent fevers or melena or hematochezia. She does have menorrhagia. PAST MEDICAL HISTORY:  1. Asthma. 2.  Diabetes. 3.  Hypertension. 4.  Hyperlipidemia. 5.  Morbid obesity. 6.  Hypothyroidism. PAST SURGICAL HISTORY:  She has no surgery. ALLERGIES:  She is allergic to PENICILLIN which caused unknown reaction. MEDICINES:  Clonidine 0.2 mg p.o. b.i.d., Norvasc 10 mg p.o. daily,  Synthroid 175 mcg p.o. daily. SOCIAL HISTORY:  She is single but engaged. She smokes one-quarter of a  pack a day. She drinks occasionally. FAMILY HISTORY:  There are no blood disorders that she is aware of.     REVIEW OF SYSTEMS:  She denies any recent fever, chills, sweats,  shortness of breath, chest pain, dysphagia, odynophagia, diarrhea,  constipation, hemoptysis, hematemesis, change in  vision/hearing/smell/taste, neuropathy, skin rashes, productive cough,  urinary or bowel prolapse or incontinence, petechiae, purpura, skin  rashes, vaginal bleeding, pruritus, hallucinations, nasal congestion or  drainage, seizures, strokes, syncope, depression, anxiety, suicidal  ideations, melena, or hematochezia. She has mild to moderate fatigue  and mild to moderate generalized weakness. Her nausea and decreased  appetite are improving. Her headache is improving. Her 10-system  review of systems is otherwise negative. PHYSICAL EXAMINATION:  VITAL SIGNS:  She is afebrile with normal vital signs. GENERAL:  She is in no acute distress. HEENT:  Her pupils are round and reactive to light and accommodation. Extraocular muscles are intact. NECK:  She has no jugular venous distention. No thyromegaly. Oropharynx is clear. She has no carotid bruits. She has no palpable  cervical, supraclavicular, or axillary lymphadenopathy. HEART:  Regular rate and rhythm. LUNGS:  Clear to auscultation bilaterally. ABDOMEN:  Nondistended, nontender with bowel sounds x4. No  hepatosplenomegaly. EXTREMITIES:  She has no peripheral clubbing, cyanosis, or edema. NEUROLOGIC EXAM:  Nonfocal.    LABORATORY DATA:  Her white blood cell count is 6.8, hemoglobin 9.9,  platelets of 834. ASSESSMENT AND PLAN:. Anemia. Her anemia is likely multifactorial.   Her iron studies are consistent with chronic inflammation but I cannot  completely rule out iron deficiency. She does have menorrhagia. I will  give a dose of IV iron. I will have her stools guaiac'd. She does also  have acute kidney injury which is definitely contributing. I will  consider giving a low dose of erythropoietin if her counts continue to  drop. I explained the potential side effects of this including a  slightly higher risk of strokes, heart attacks, blood clots.   I will  check her B12 and folate deficiency. I will check a haptoglobin to rule  out hemolysis. I will check an SPEP to help rule out myeloma. She is  not on any medications that can cause _____ hepatosplenomegaly on  examination. In light of her increased MCV, she could have an  underlying bone marrow disorder. I may consider a bone marrow biopsy if  her counts continue to drop. She may also have bone marrow suppression  from a presumed acute viral syndrome. Thank you for the consultation. I will follow closely.         Ildefonso Santiago MD    D: 08/24/2020 20:45:01       T: 08/24/2020 23:02:02     [email or web address]  Job#: [id number]     Doc#: [id number]    CC:

## 2020-08-25 NOTE — PLAN OF CARE
Problem: Pain:  Goal: Pain level will decrease  Description: Pain level will decrease  Outcome: Ongoing  Goal: Control of acute pain  Description: Control of acute pain  Outcome: Ongoing  Goal: Control of chronic pain  Description: Control of chronic pain  Outcome: Ongoing   Pain scale assessed. PRN analgesics administered per MD orders.

## 2020-08-25 NOTE — PROGRESS NOTES
[facility]                                                 [phone]                                      [facility] [employer]                                        Inpatient Progress note    Summary:   Patient here with dehydration, headaches and MIN. MIN likley related to poor PO intake in combination with NSAIDs, HCTZ. /88   cc + 2 x  Negative PVR  Dizziness and lightheaded resolved. Appetite is better    Plan:   - discussed the importance of avoiding NSAID pain medications. - follow up with nephrology will be arranged. - stopping IVFs, PO intake is adequate. - will check renal panel now and ensure stable labs, hypokalemia corrected. - reviewed labs, ok to dc      Assessment:   Non oliguric acute kidney injury:   Baseline Cr 1.2 peaked at 2  Has HTn NSAID use as risk factors. Non oliguric. Responded to hydration   ACR 95 mg  Urine cx was NGTD  16 RBC, hyaline casts and WBC. Epithelial cells as well. Electrolytes/acid base:   Hypokalemic. Yesterday will repeat labs. No acidosis. CKD-BMD:   n/a    BP/volume: Bp elevated but asymptomatic. Appears euvoelmic. Home meds: clonidine 02 mg BID, amlodipine 10 mg d, HCTZ 25 mg d    Heme:  Macrocytic anemia per oncology    [facility] thanks you for the opportunity to serve this patient. Please call with any questions of concerns. Rickey Holter, MD  [facility]  [address], [address]  Fax: [phone]  Cell: [phone]  24 hrs answering service [phone]      CC:  MIN    HPI:  Ms. Marycarmen Dumont was admitted with headaches, dizziness and poor PO intake for several days. She had not been able to hydrate well. She was using ibuprofen heavily. Patient was given fluids and Cr came down. Interval History  Patient is being seen today for MIN. She feels back to herself and has no complaints. She has no edema. No nausea and able to take PO without issues.  She was seen by oncology for anemia and will follow up outpatient. ROS:   negative  12 point ROS negative except as mentioned here:   Constitutional: no fever, chills, weakness, weight change, fatigue  Skin: no rashes, pruritis, hair loss, bruising, dry skin, petechiae  Head, neck, face: no no headaches, swelling, cervical adenopathy  Respiratory: no cough, shortness of breath, wheezing, sputum production  Cardiovascular: no chest pain, palpitations, dizziness, no edema  GI: no nausea, vomiting, diarrhea, constipation, belly pain, jaundice, blood in the stool   Musculoskeletal: noback pain, muscle weakness, gait problem, joint pain or swelling. Genitourinary: no dysuria, hematuria, foamy urine, decreased urine output, suprapubic tenderness  Neurologic: no vertigo, TIAs, syncope, seizures, focal weakness, dysarthria  Psychiatric: noanxiety, depression, insomnia. Past medical history, surgical history, social history, family history are reviewed and updated as appropriate. Reviewed current medication list.     Vitals:    08/25/20 0734   BP: (!) 152/88   Pulse: 86   Resp: 16   Temp: 98.5 °F (36.9 °C)   SpO2: 95%       General appearance: pleasant lady in NAD, fully alert and oriented. Comfortable. BMI 43  HEENT: EOM intact, no icterus. Trachea is midline. Neck : No mass, appears symmetrical, no JVD   Respiratory: Respiratory effort appears normal, no wheeze, no crackles  Cardiovascular: Ausculation- No M/R/G, noedema  Abdomen: No visible mass or tenderness, or scar, No hepatosplenomegaly  Musculoskeletal:  No clubbing,cyanosis, joints with no swelling or deformity. Skin:no rashes, ulcers, induration, no jaundice. Neuro: face symmetric, no focal deficits. Appropriate responses.  CN 2-12 grossly intact

## 2020-08-25 NOTE — PLAN OF CARE
Problem: Pain:  Goal: Pain level will decrease  Description: Pain level will decrease  8/25/2020 1053 by Marie David RN  Outcome: Ongoing     Problem: Pain:  Goal: Control of acute pain  Description: Control of acute pain  8/25/2020 1053 by Marie David RN  Outcome: Ongoing     Problem: Pain:  Goal: Control of chronic pain  Description: Control of chronic pain  8/25/2020 1053 by Marie David RN  Outcome: Ongoing

## 2020-08-26 LAB
ALBUMIN SERPL-MCNC: 2.6 G/DL (ref 3.1–4.9)
ALPHA-1-GLOBULIN: 0.4 G/DL (ref 0.2–0.4)
ALPHA-2-GLOBULIN: 1.2 G/DL (ref 0.4–1.1)
BETA GLOBULIN: 1.6 G/DL (ref 0.9–1.6)
GAMMA GLOBULIN: 1 G/DL (ref 0.6–1.8)
TOTAL PROTEIN: 6.7 G/DL (ref 6.4–8.2)

## 2020-08-28 LAB
BLOOD CULTURE, ROUTINE: NORMAL
CULTURE, BLOOD 2: NORMAL
SPE/IFE INTERPRETATION: NORMAL

## 2021-11-05 ENCOUNTER — TELEPHONE (OUTPATIENT)
Dept: ENDOCRINOLOGY | Age: 46
End: 2021-11-05

## 2021-12-10 ENCOUNTER — HOSPITAL ENCOUNTER (OUTPATIENT)
Dept: ULTRASOUND IMAGING | Age: 46
Discharge: HOME | End: 2021-12-10
Payer: COMMERCIAL

## 2021-12-10 DIAGNOSIS — N18.30 STAGE 3 CHRONIC KIDNEY DISEASE, UNSPECIFIED WHETHER STAGE 3A OR 3B CKD (HCC): ICD-10-CM

## 2021-12-10 PROCEDURE — 76770 US EXAM ABDO BACK WALL COMP: CPT

## 2022-04-07 ENCOUNTER — HOSPITAL ENCOUNTER (EMERGENCY)
Age: 47
Discharge: HOME | DRG: 854 | End: 2022-04-07
Attending: EMERGENCY MEDICINE
Payer: COMMERCIAL

## 2022-04-07 VITALS
HEIGHT: 67 IN | SYSTOLIC BLOOD PRESSURE: 162 MMHG | OXYGEN SATURATION: 97 % | WEIGHT: 293 LBS | RESPIRATION RATE: 18 BRPM | BODY MASS INDEX: 45.99 KG/M2 | HEART RATE: 89 BPM | DIASTOLIC BLOOD PRESSURE: 86 MMHG | TEMPERATURE: 100.4 F

## 2022-04-07 DIAGNOSIS — N12 PYELONEPHRITIS: Primary | ICD-10-CM

## 2022-04-07 LAB
BACTERIA: ABNORMAL /HPF
BILIRUBIN URINE: NEGATIVE
BLOOD, URINE: ABNORMAL
CLARITY: ABNORMAL
COLOR: YELLOW
COMMENT UA: ABNORMAL
EPITHELIAL CELLS, UA: 2 /HPF (ref 0–5)
GLUCOSE URINE: NEGATIVE MG/DL
HCG(URINE) PREGNANCY TEST: NEGATIVE
HYALINE CASTS: 1 /LPF (ref 0–8)
KETONES, URINE: NEGATIVE MG/DL
LEUKOCYTE ESTERASE, URINE: ABNORMAL
MICROSCOPIC EXAMINATION: YES
NITRITE, URINE: NEGATIVE
PH UA: 6 (ref 5–8)
PROTEIN UA: 100 MG/DL
SPECIFIC GRAVITY UA: 1.02 (ref 1–1.03)
URINE REFLEX TO CULTURE: YES
URINE TYPE: ABNORMAL
UROBILINOGEN, URINE: 0.2 E.U./DL
WBC UA: 574 /HPF (ref 0–5)

## 2022-04-07 PROCEDURE — 84703 CHORIONIC GONADOTROPIN ASSAY: CPT

## 2022-04-07 PROCEDURE — 87077 CULTURE AEROBIC IDENTIFY: CPT

## 2022-04-07 PROCEDURE — 81001 URINALYSIS AUTO W/SCOPE: CPT

## 2022-04-07 PROCEDURE — 99284 EMERGENCY DEPT VISIT MOD MDM: CPT

## 2022-04-07 PROCEDURE — 87186 SC STD MICRODIL/AGAR DIL: CPT

## 2022-04-07 PROCEDURE — 6370000000 HC RX 637 (ALT 250 FOR IP): Performed by: EMERGENCY MEDICINE

## 2022-04-07 PROCEDURE — 87086 URINE CULTURE/COLONY COUNT: CPT

## 2022-04-07 RX ORDER — CIPROFLOXACIN 500 MG/1
500 TABLET, FILM COATED ORAL 2 TIMES DAILY
Qty: 20 TABLET | Refills: 0 | Status: ON HOLD | OUTPATIENT
Start: 2022-04-07 | End: 2022-04-13 | Stop reason: HOSPADM

## 2022-04-07 RX ORDER — CIPROFLOXACIN 500 MG/1
500 TABLET, FILM COATED ORAL ONCE
Status: COMPLETED | OUTPATIENT
Start: 2022-04-07 | End: 2022-04-07

## 2022-04-07 RX ORDER — IBUPROFEN 400 MG/1
800 TABLET ORAL ONCE
Status: COMPLETED | OUTPATIENT
Start: 2022-04-07 | End: 2022-04-07

## 2022-04-07 RX ORDER — IBUPROFEN 800 MG/1
800 TABLET ORAL EVERY 8 HOURS PRN
Qty: 30 TABLET | Refills: 0 | Status: ON HOLD | OUTPATIENT
Start: 2022-04-07 | End: 2022-04-13 | Stop reason: HOSPADM

## 2022-04-07 RX ADMIN — CIPROFLOXACIN 500 MG: 500 TABLET, FILM COATED ORAL at 18:01

## 2022-04-07 RX ADMIN — IBUPROFEN 800 MG: 400 TABLET, FILM COATED ORAL at 18:02

## 2022-04-07 ASSESSMENT — PAIN SCALES - GENERAL: PAINLEVEL_OUTOF10: 9

## 2022-04-07 ASSESSMENT — PAIN DESCRIPTION - FREQUENCY: FREQUENCY: CONTINUOUS

## 2022-04-07 ASSESSMENT — PAIN - FUNCTIONAL ASSESSMENT: PAIN_FUNCTIONAL_ASSESSMENT: 0-10

## 2022-04-07 ASSESSMENT — PAIN DESCRIPTION - LOCATION: LOCATION: BACK;HEAD

## 2022-04-07 NOTE — ED TRIAGE NOTES
Pt arrives ambulatory for eval of headache and back pain and burning with urination onset yesterday. Pt denies n/v/d. Pt is a/xo4, rsp nonlabored and wpd.

## 2022-04-07 NOTE — ED PROVIDER NOTES
Emergency Physician Note        Note Open Time: 6:36 PM EDT    Chief Complaint  Headache (Pt arrives ambulatory for eval of headache and back pain onset yesterday with out injury. Also pain with urination. ), Back Pain, and Urinary Pain       History of Present Illness  Scott Garcia is a 42 y.o. female who presents to the ED for headache. Patient reports she had gradual onset of generalized headache body aches and chills since yesterday. She also has dysuria. She has complaints of lower back pain. She denies any vaginal bleeding or discharge. No vomiting or diarrhea. 10 systems reviewed, pertinent positives per HPI otherwise noted to be negative    I have reviewed the following from the nursing documentation:      Prior to Admission medications    Medication Sig Start Date End Date Taking? Authorizing Provider                   folic acid-pyridoxine-cyanocobalamine (FOLTX) 2.5-25-1 MG TABS tablet Take 1 tablet by mouth daily 8/26/20   Lucian Gay MD   cloNIDine (CATAPRES) 0.2 MG tablet Take 0.2 mg by mouth 2 times daily    Historical Provider, MD   amLODIPine (NORVASC) 5 MG tablet Take 10 mg by mouth daily    Historical Provider, MD   levothyroxine (SYNTHROID) 175 MCG tablet Take 175 mcg by mouth daily 9/12/14   Historical Provider, MD   budesonide-formoterol (SYMBICORT) 160-4.5 MCG/ACT AERO Inhale 2 puffs into the lungs 2 times daily 8/1/13   Historical Provider, MD       Allergies as of 04/07/2022 - Fully Reviewed 04/07/2022   Allergen Reaction Noted    Penicillins Hives 08/05/2013       Past Medical History:   Diagnosis Date    Asthma     Diabetes mellitus ([facility] [address]     Hyperlipidemia     Hypertension         Surgical History: History reviewed. No pertinent surgical history. Family History:  History reviewed. No pertinent family history.     Social History     Socioeconomic History    Marital status: Single     Spouse name: Not on file    Number of children: Not on file    Years of education: Not on file    Highest education level: Not on file   Occupational History    Not on file   Tobacco Use    Smoking status: Current Every Day Smoker     Packs/day: 0.50     Types: Cigarettes    Smokeless tobacco: Never Used   Vaping Use    Vaping Use: Never used   Substance and Sexual Activity    Alcohol use: Yes     Comment: social    Drug use: Not Currently     Comment: occasional    Sexual activity: Not on file   Other Topics Concern    Not on file   Social History Narrative    Not on file     Social Determinants of Health     Financial Resource Strain:     Difficulty of Paying Living Expenses: Not on file   Food Insecurity:     Worried About Running Out of Food in the Last Year: Not on file    Brad of Food in the Last Year: Not on file   Transportation Needs:     Lack of Transportation (Medical): Not on file    Lack of Transportation (Non-Medical): Not on file   Physical Activity:     Days of Exercise per Week: Not on file    Minutes of Exercise per Session: Not on file   Stress:     Feeling of Stress : Not on file   Social Connections:     Frequency of Communication with Friends and Family: Not on file    Frequency of Social Gatherings with Friends and Family: Not on file    Attends [employer] Services: Not on file    Active Member of [address] [place] [address] or Organizations: Not on file    Attends Club or Organization Meetings: Not on file    Marital Status: Not on file   Intimate Partner Violence:     Fear of Current or Ex-Partner: Not on file    Emotionally Abused: Not on file    Physically Abused: Not on file    Sexually Abused: Not on file   Housing Stability:     Unable to Pay for Housing in the Last Year: Not on file    Number of [place] in the Last Year: Not on file    Unstable Housing in the Last Year: Not on file       Nursing notes reviewed.     ED Triage Vitals [04/07/22 1630]   Enc Vitals Group      BP (!) 162/86      Pulse 89      Resp 18      Temp 100.4 °F (38 °C) Temp Source Temporal      SpO2 97 %      Weight (!) 302 lb 4 oz (137.1 kg)      Height 5' 7\" (1.702 m)      Head Circumference       Peak Flow       Pain Score       Pain Loc       Pain Edu? Excl. in [address]? GENERAL:  Awake, alert. Well developed, morbidly obese with no apparent distress. HENT:  Normocephalic, Atraumatic, moist mucous membranes. EYES:  Pupils equal round and reactive to light, Conjunctiva normal, extraocular movements normal.  NECK:  No meningeal signs, Supple. CHEST:  Regular rate and rhythm, chest wall non-tender. LUNGS:  Clear to auscultation bilaterally. ABDOMEN:  Soft, non-tender, no rebound, rigidity or guarding, non-distended, normal bowel sounds. Bilateral costovertebral angle tenderness to palpation. BACK:  No tenderness. EXTREMITIES:  Normal range of motion, no edema, no bony tenderness, no deformity, distal pulses present. SKIN: Warm, dry and intact. NEUROLOGIC: Normal mental status. Moving all extremities to command. LABS  Labs Reviewed   URINALYSIS WITH REFLEX TO CULTURE - Abnormal; Notable for the following components:       Result Value    Clarity, UA TURBID (*)     Blood, Urine MODERATE (*)     Protein,  (*)     Leukocyte Esterase, Urine LARGE (*)     All other components within normal limits   MICROSCOPIC URINALYSIS - Abnormal; Notable for the following components:    Bacteria, UA 1+ (*)     WBC,  (*)     All other components within normal limits   CULTURE, URINE   PREGNANCY, URINE       MEDICAL DECISION MAKING     I advised the patient to return to the emergency department immediately for any new or worsening symptoms, such as vomiting, fever or increased pain. The patient voiced agreement and understanding of the treatment plan.             I estimate there is LOW risk for ACUTE APPENDICITIS, BOWEL OBSTRUCTION, CHOLECYSTITIS, DIVERTICULITIS, INCARCERATED HERNIA, PANCREATITIS, or PERFORATED BOWEL or ULCER, thus I consider the discharge disposition reasonable. Also, there is no evidence or peritonitis, sepsis, or toxicity. Barbara Ortiz and I have discussed the diagnosis and risks, and we agree with discharging home to follow-up with their primary doctor. We also discussed returning to the Emergency Department immediately if new or worsening symptoms occur. We have discussed the symptoms which are most concerning (e.g., bloody stool, fever, changing or worsening pain, vomiting) that necessitate immediate return. FINAL Impression    1. Pyelonephritis        Blood pressure (!) 162/86, pulse 89, temperature 100.4 °F (38 °C), temperature source Temporal, resp. rate 18, height 5' 7\" (1.702 m), weight (!) 302 lb 4 oz (137.1 kg), SpO2 97 %. Patient was given scripts for the following medications. I counseled patient how to take these medications. Discharge Medication List as of 4/7/2022  6:21 PM      START taking these medications    Details   ciprofloxacin (CIPRO) 500 MG tablet Take 1 tablet by mouth 2 times daily for 10 days, Disp-20 tablet, R-0Normal      ibuprofen (ADVIL;MOTRIN) 800 MG tablet Take 1 tablet by mouth every 8 hours as needed for Pain, Disp-30 tablet, R-0Normal             Disposition  Pt is in good condition upon Discharge to home. This chart was generated using the [employer] dictation system. I created this record but it may contain dictation errors.           Eduarda Del Rosario MD  04/07/22 Ketty Klein

## 2022-04-09 ENCOUNTER — APPOINTMENT (OUTPATIENT)
Dept: CT IMAGING | Age: 47
DRG: 854 | End: 2022-04-09
Payer: COMMERCIAL

## 2022-04-09 ENCOUNTER — HOSPITAL ENCOUNTER (INPATIENT)
Age: 47
LOS: 4 days | Discharge: HOME | DRG: 854 | End: 2022-04-13
Attending: INTERNAL MEDICINE | Admitting: INTERNAL MEDICINE
Payer: COMMERCIAL

## 2022-04-09 DIAGNOSIS — N30.01 ACUTE CYSTITIS WITH HEMATURIA: ICD-10-CM

## 2022-04-09 DIAGNOSIS — A41.9 SEPTICEMIA (HCC): Primary | ICD-10-CM

## 2022-04-09 DIAGNOSIS — N17.9 ACUTE KIDNEY INJURY (HCC): ICD-10-CM

## 2022-04-09 DIAGNOSIS — N13.2 URETERAL STONE WITH HYDRONEPHROSIS: ICD-10-CM

## 2022-04-09 PROBLEM — N39.0 COMPLICATED UTI (URINARY TRACT INFECTION): Status: ACTIVE | Noted: 2022-04-09

## 2022-04-09 PROBLEM — N39.0 UTI (URINARY TRACT INFECTION): Status: ACTIVE | Noted: 2022-04-09

## 2022-04-09 PROBLEM — R79.89 ELEVATED LACTIC ACID LEVEL: Status: ACTIVE | Noted: 2022-04-09

## 2022-04-09 PROBLEM — N18.9 CKD (CHRONIC KIDNEY DISEASE): Status: ACTIVE | Noted: 2022-04-09

## 2022-04-09 PROBLEM — N13.9 OBSTRUCTED, UROPATHY: Status: ACTIVE | Noted: 2022-04-09

## 2022-04-09 LAB
A/G RATIO: 0.6 (ref 1.1–2.2)
ALBUMIN SERPL-MCNC: 2.7 G/DL (ref 3.4–5)
ALP BLD-CCNC: 205 U/L (ref 40–129)
ALT SERPL-CCNC: 14 U/L (ref 10–40)
ANION GAP SERPL CALCULATED.3IONS-SCNC: 16 MMOL/L (ref 3–16)
ANION GAP SERPL CALCULATED.3IONS-SCNC: 16 MMOL/L (ref 3–16)
AST SERPL-CCNC: 20 U/L (ref 15–37)
BACTERIA: ABNORMAL /HPF
BANDED NEUTROPHILS RELATIVE PERCENT: 6 % (ref 0–7)
BASOPHILS ABSOLUTE: 0 K/UL (ref 0–0.2)
BASOPHILS RELATIVE PERCENT: 0 %
BILIRUB SERPL-MCNC: 0.9 MG/DL (ref 0–1)
BILIRUBIN URINE: NEGATIVE
BLOOD, URINE: ABNORMAL
BUN BLDV-MCNC: 38 MG/DL (ref 7–20)
BUN BLDV-MCNC: 42 MG/DL (ref 7–20)
CALCIUM SERPL-MCNC: 8.8 MG/DL (ref 8.3–10.6)
CALCIUM SERPL-MCNC: 9.1 MG/DL (ref 8.3–10.6)
CHLORIDE BLD-SCNC: 101 MMOL/L (ref 99–110)
CHLORIDE BLD-SCNC: 102 MMOL/L (ref 99–110)
CLARITY: CLEAR
CO2: 18 MMOL/L (ref 21–32)
CO2: 18 MMOL/L (ref 21–32)
COLOR: YELLOW
COMMENT UA: ABNORMAL
CREAT SERPL-MCNC: 3.7 MG/DL (ref 0.6–1.1)
CREAT SERPL-MCNC: 3.8 MG/DL (ref 0.6–1.1)
EOSINOPHILS ABSOLUTE: 0 K/UL (ref 0–0.6)
EOSINOPHILS RELATIVE PERCENT: 0 %
EPITHELIAL CELLS, UA: 13 /HPF (ref 0–5)
GFR AFRICAN AMERICAN: 16
GFR AFRICAN AMERICAN: 16
GFR NON-AFRICAN AMERICAN: 13
GFR NON-AFRICAN AMERICAN: 13
GLUCOSE BLD-MCNC: 114 MG/DL (ref 70–99)
GLUCOSE BLD-MCNC: 119 MG/DL (ref 70–99)
GLUCOSE BLD-MCNC: 133 MG/DL (ref 70–99)
GLUCOSE URINE: NEGATIVE MG/DL
HCG QUALITATIVE: NEGATIVE
HCT VFR BLD CALC: 33.4 % (ref 36–48)
HEMOGLOBIN: 11.3 G/DL (ref 12–16)
HYALINE CASTS: 1 /LPF (ref 0–8)
KETONES, URINE: NEGATIVE MG/DL
LACTIC ACID: 1.4 MMOL/L (ref 0.4–2)
LACTIC ACID: 2.4 MMOL/L (ref 0.4–2)
LEUKOCYTE ESTERASE, URINE: ABNORMAL
LIPASE: 12 U/L (ref 13–60)
LYMPHOCYTES ABSOLUTE: 0.2 K/UL (ref 1–5.1)
LYMPHOCYTES RELATIVE PERCENT: 4 %
MACROCYTES: ABNORMAL
MCH RBC QN AUTO: 34.5 PG (ref 26–34)
MCHC RBC AUTO-ENTMCNC: 33.8 G/DL (ref 31–36)
MCV RBC AUTO: 102.1 FL (ref 80–100)
MICROSCOPIC EXAMINATION: YES
MONOCYTES ABSOLUTE: 0.1 K/UL (ref 0–1.3)
MONOCYTES RELATIVE PERCENT: 2 %
NEUTROPHILS ABSOLUTE: 4 K/UL (ref 1.7–7.7)
NEUTROPHILS RELATIVE PERCENT: 88 %
NITRITE, URINE: NEGATIVE
PDW BLD-RTO: 14.5 % (ref 12.4–15.4)
PERFORMED ON: ABNORMAL
PH UA: 6 (ref 5–8)
PLATELET # BLD: 234 K/UL (ref 135–450)
PLATELET SLIDE REVIEW: ADEQUATE
PMV BLD AUTO: 8.9 FL (ref 5–10.5)
POTASSIUM REFLEX MAGNESIUM: 3.9 MMOL/L (ref 3.5–5.1)
POTASSIUM SERPL-SCNC: 3.9 MMOL/L (ref 3.5–5.1)
PROCALCITONIN: >100 NG/ML (ref 0–0.15)
PROTEIN UA: >=300 MG/DL
RBC # BLD: 3.27 M/UL (ref 4–5.2)
RBC UA: ABNORMAL /HPF (ref 0–4)
SLIDE REVIEW: ABNORMAL
SODIUM BLD-SCNC: 135 MMOL/L (ref 136–145)
SODIUM BLD-SCNC: 136 MMOL/L (ref 136–145)
SPECIFIC GRAVITY UA: 1.02 (ref 1–1.03)
TOTAL PROTEIN: 6.9 G/DL (ref 6.4–8.2)
URINE REFLEX TO CULTURE: YES
URINE TYPE: ABNORMAL
UROBILINOGEN, URINE: 4 E.U./DL
WBC # BLD: 4.3 K/UL (ref 4–11)
WBC UA: >900 /HPF (ref 0–5)

## 2022-04-09 PROCEDURE — 80053 COMPREHEN METABOLIC PANEL: CPT

## 2022-04-09 PROCEDURE — 87186 SC STD MICRODIL/AGAR DIL: CPT

## 2022-04-09 PROCEDURE — 93005 ELECTROCARDIOGRAM TRACING: CPT | Performed by: PHYSICIAN ASSISTANT

## 2022-04-09 PROCEDURE — 84703 CHORIONIC GONADOTROPIN ASSAY: CPT

## 2022-04-09 PROCEDURE — 74176 CT ABD & PELVIS W/O CONTRAST: CPT

## 2022-04-09 PROCEDURE — 81001 URINALYSIS AUTO W/SCOPE: CPT

## 2022-04-09 PROCEDURE — 6360000002 HC RX W HCPCS: Performed by: PHYSICIAN ASSISTANT

## 2022-04-09 PROCEDURE — 36415 COLL VENOUS BLD VENIPUNCTURE: CPT

## 2022-04-09 PROCEDURE — 1200000000 HC SEMI PRIVATE

## 2022-04-09 PROCEDURE — 94640 AIRWAY INHALATION TREATMENT: CPT

## 2022-04-09 PROCEDURE — 6370000000 HC RX 637 (ALT 250 FOR IP): Performed by: INTERNAL MEDICINE

## 2022-04-09 PROCEDURE — 2580000003 HC RX 258: Performed by: PHYSICIAN ASSISTANT

## 2022-04-09 PROCEDURE — 87086 URINE CULTURE/COLONY COUNT: CPT

## 2022-04-09 PROCEDURE — 83036 HEMOGLOBIN GLYCOSYLATED A1C: CPT

## 2022-04-09 PROCEDURE — 94761 N-INVAS EAR/PLS OXIMETRY MLT: CPT

## 2022-04-09 PROCEDURE — 96365 THER/PROPH/DIAG IV INF INIT: CPT

## 2022-04-09 PROCEDURE — 85025 COMPLETE CBC W/AUTO DIFF WBC: CPT

## 2022-04-09 PROCEDURE — 83690 ASSAY OF LIPASE: CPT

## 2022-04-09 PROCEDURE — 87040 BLOOD CULTURE FOR BACTERIA: CPT

## 2022-04-09 PROCEDURE — 2580000003 HC RX 258: Performed by: INTERNAL MEDICINE

## 2022-04-09 PROCEDURE — 96375 TX/PRO/DX INJ NEW DRUG ADDON: CPT

## 2022-04-09 PROCEDURE — 87077 CULTURE AEROBIC IDENTIFY: CPT

## 2022-04-09 PROCEDURE — 2700000000 HC OXYGEN THERAPY PER DAY

## 2022-04-09 PROCEDURE — 6360000002 HC RX W HCPCS: Performed by: INTERNAL MEDICINE

## 2022-04-09 PROCEDURE — 83605 ASSAY OF LACTIC ACID: CPT

## 2022-04-09 PROCEDURE — 84145 PROCALCITONIN (PCT): CPT

## 2022-04-09 PROCEDURE — 99284 EMERGENCY DEPT VISIT MOD MDM: CPT

## 2022-04-09 RX ORDER — PIOGLITAZONEHYDROCHLORIDE 15 MG/1
15 TABLET ORAL DAILY
COMMUNITY
Start: 2022-03-17

## 2022-04-09 RX ORDER — METOPROLOL SUCCINATE 50 MG/1
50 TABLET, EXTENDED RELEASE ORAL DAILY
Status: ON HOLD | COMMUNITY
End: 2022-04-13 | Stop reason: HOSPADM

## 2022-04-09 RX ORDER — ALLOPURINOL 100 MG/1
100 TABLET ORAL DAILY
COMMUNITY
Start: 2022-03-17

## 2022-04-09 RX ORDER — AMLODIPINE BESYLATE 10 MG/1
10 TABLET ORAL DAILY
Status: DISCONTINUED | OUTPATIENT
Start: 2022-04-10 | End: 2022-04-12

## 2022-04-09 RX ORDER — METOPROLOL SUCCINATE 50 MG/1
50 TABLET, EXTENDED RELEASE ORAL DAILY
Status: DISCONTINUED | OUTPATIENT
Start: 2022-04-10 | End: 2022-04-11

## 2022-04-09 RX ORDER — ONDANSETRON 2 MG/ML
4 INJECTION INTRAMUSCULAR; INTRAVENOUS ONCE
Status: COMPLETED | OUTPATIENT
Start: 2022-04-09 | End: 2022-04-09

## 2022-04-09 RX ORDER — CLONIDINE HYDROCHLORIDE 0.1 MG/1
0.1 TABLET ORAL 2 TIMES DAILY
Status: DISCONTINUED | OUTPATIENT
Start: 2022-04-09 | End: 2022-04-12

## 2022-04-09 RX ORDER — ALLOPURINOL 100 MG/1
100 TABLET ORAL DAILY
Status: DISCONTINUED | OUTPATIENT
Start: 2022-04-10 | End: 2022-04-13 | Stop reason: HOSPADM

## 2022-04-09 RX ORDER — POLYETHYLENE GLYCOL 3350 17 G/17G
17 POWDER, FOR SOLUTION ORAL DAILY PRN
Status: DISCONTINUED | OUTPATIENT
Start: 2022-04-09 | End: 2022-04-13 | Stop reason: HOSPADM

## 2022-04-09 RX ORDER — ACETAMINOPHEN 650 MG/1
650 SUPPOSITORY RECTAL EVERY 6 HOURS PRN
Status: DISCONTINUED | OUTPATIENT
Start: 2022-04-09 | End: 2022-04-13 | Stop reason: HOSPADM

## 2022-04-09 RX ORDER — ATORVASTATIN CALCIUM 40 MG/1
40 TABLET, FILM COATED ORAL NIGHTLY
Status: DISCONTINUED | OUTPATIENT
Start: 2022-04-09 | End: 2022-04-13 | Stop reason: HOSPADM

## 2022-04-09 RX ORDER — CLONIDINE HYDROCHLORIDE 0.1 MG/1
0.2 TABLET ORAL 2 TIMES DAILY
Status: DISCONTINUED | OUTPATIENT
Start: 2022-04-09 | End: 2022-04-09

## 2022-04-09 RX ORDER — CYCLOBENZAPRINE HCL 10 MG
5 TABLET ORAL DAILY
Status: DISCONTINUED | OUTPATIENT
Start: 2022-04-10 | End: 2022-04-13 | Stop reason: HOSPADM

## 2022-04-09 RX ORDER — ALBUTEROL SULFATE 90 UG/1
1 AEROSOL, METERED RESPIRATORY (INHALATION)
COMMUNITY

## 2022-04-09 RX ORDER — KETOROLAC TROMETHAMINE 30 MG/ML
15 INJECTION, SOLUTION INTRAMUSCULAR; INTRAVENOUS ONCE
Status: COMPLETED | OUTPATIENT
Start: 2022-04-09 | End: 2022-04-09

## 2022-04-09 RX ORDER — ALBUTEROL SULFATE 90 UG/1
1 AEROSOL, METERED RESPIRATORY (INHALATION) EVERY 4 HOURS PRN
Status: DISCONTINUED | OUTPATIENT
Start: 2022-04-09 | End: 2022-04-13 | Stop reason: HOSPADM

## 2022-04-09 RX ORDER — DEXTROSE MONOHYDRATE 25 G/50ML
12.5 INJECTION, SOLUTION INTRAVENOUS PRN
Status: DISCONTINUED | OUTPATIENT
Start: 2022-04-09 | End: 2022-04-13 | Stop reason: HOSPADM

## 2022-04-09 RX ORDER — 0.9 % SODIUM CHLORIDE 0.9 %
1000 INTRAVENOUS SOLUTION INTRAVENOUS ONCE
Status: COMPLETED | OUTPATIENT
Start: 2022-04-09 | End: 2022-04-09

## 2022-04-09 RX ORDER — ACETAMINOPHEN 325 MG/1
650 TABLET ORAL EVERY 6 HOURS PRN
Status: DISCONTINUED | OUTPATIENT
Start: 2022-04-09 | End: 2022-04-13 | Stop reason: HOSPADM

## 2022-04-09 RX ORDER — ONDANSETRON 4 MG/1
4 TABLET, ORALLY DISINTEGRATING ORAL EVERY 8 HOURS PRN
Status: DISCONTINUED | OUTPATIENT
Start: 2022-04-09 | End: 2022-04-13 | Stop reason: HOSPADM

## 2022-04-09 RX ORDER — ONDANSETRON 2 MG/ML
4 INJECTION INTRAMUSCULAR; INTRAVENOUS EVERY 6 HOURS PRN
Status: DISCONTINUED | OUTPATIENT
Start: 2022-04-09 | End: 2022-04-13 | Stop reason: HOSPADM

## 2022-04-09 RX ORDER — ATORVASTATIN CALCIUM 40 MG/1
40 TABLET, FILM COATED ORAL DAILY
COMMUNITY

## 2022-04-09 RX ORDER — BUDESONIDE AND FORMOTEROL FUMARATE DIHYDRATE 80; 4.5 UG/1; UG/1
2 AEROSOL RESPIRATORY (INHALATION) 2 TIMES DAILY
Status: DISCONTINUED | OUTPATIENT
Start: 2022-04-09 | End: 2022-04-13 | Stop reason: HOSPADM

## 2022-04-09 RX ORDER — SODIUM CHLORIDE 0.9 % (FLUSH) 0.9 %
5-40 SYRINGE (ML) INJECTION PRN
Status: DISCONTINUED | OUTPATIENT
Start: 2022-04-09 | End: 2022-04-13 | Stop reason: HOSPADM

## 2022-04-09 RX ORDER — POTASSIUM CHLORIDE 20 MEQ/1
1 TABLET, EXTENDED RELEASE ORAL DAILY
Status: ON HOLD | COMMUNITY
End: 2022-04-13 | Stop reason: HOSPADM

## 2022-04-09 RX ORDER — DEXTROSE MONOHYDRATE 50 MG/ML
100 INJECTION, SOLUTION INTRAVENOUS PRN
Status: DISCONTINUED | OUTPATIENT
Start: 2022-04-09 | End: 2022-04-13 | Stop reason: HOSPADM

## 2022-04-09 RX ORDER — DIBASIC SODIUM PHOSPHATE, MONOBASIC POTASSIUM PHOSPHATE AND MONOBASIC SODIUM PHOSPHATE 852; 155; 130 MG/1; MG/1; MG/1
1 TABLET ORAL 2 TIMES DAILY
Status: ON HOLD | COMMUNITY
Start: 2022-03-17 | End: 2022-04-13 | Stop reason: HOSPADM

## 2022-04-09 RX ORDER — CYCLOBENZAPRINE HCL 5 MG
5 TABLET ORAL DAILY
Status: ON HOLD | COMMUNITY
Start: 2022-03-17 | End: 2022-04-13 | Stop reason: HOSPADM

## 2022-04-09 RX ORDER — SODIUM CHLORIDE 9 MG/ML
INJECTION, SOLUTION INTRAVENOUS PRN
Status: DISCONTINUED | OUTPATIENT
Start: 2022-04-09 | End: 2022-04-13 | Stop reason: HOSPADM

## 2022-04-09 RX ORDER — SODIUM CHLORIDE 9 MG/ML
INJECTION, SOLUTION INTRAVENOUS CONTINUOUS
Status: DISCONTINUED | OUTPATIENT
Start: 2022-04-09 | End: 2022-04-10

## 2022-04-09 RX ORDER — NICOTINE POLACRILEX 4 MG
15 LOZENGE BUCCAL PRN
Status: DISCONTINUED | OUTPATIENT
Start: 2022-04-09 | End: 2022-04-13 | Stop reason: HOSPADM

## 2022-04-09 RX ORDER — SODIUM CHLORIDE 0.9 % (FLUSH) 0.9 %
5-40 SYRINGE (ML) INJECTION EVERY 12 HOURS SCHEDULED
Status: DISCONTINUED | OUTPATIENT
Start: 2022-04-09 | End: 2022-04-13 | Stop reason: HOSPADM

## 2022-04-09 RX ADMIN — SODIUM CHLORIDE: 9 INJECTION, SOLUTION INTRAVENOUS at 16:13

## 2022-04-09 RX ADMIN — CLONIDINE HYDROCHLORIDE 0.1 MG: 0.1 TABLET ORAL at 20:53

## 2022-04-09 RX ADMIN — SODIUM CHLORIDE: 9 INJECTION, SOLUTION INTRAVENOUS at 15:01

## 2022-04-09 RX ADMIN — SODIUM CHLORIDE 1000 ML: 9 INJECTION, SOLUTION INTRAVENOUS at 09:17

## 2022-04-09 RX ADMIN — MEROPENEM 500 MG: 500 INJECTION, POWDER, FOR SOLUTION INTRAVENOUS at 16:13

## 2022-04-09 RX ADMIN — SODIUM CHLORIDE 1000 ML: 9 INJECTION, SOLUTION INTRAVENOUS at 10:36

## 2022-04-09 RX ADMIN — CEFTRIAXONE 1000 MG: 1 INJECTION, POWDER, FOR SOLUTION INTRAMUSCULAR; INTRAVENOUS at 10:55

## 2022-04-09 RX ADMIN — ATORVASTATIN CALCIUM 40 MG: 40 TABLET, FILM COATED ORAL at 20:53

## 2022-04-09 RX ADMIN — ONDANSETRON 4 MG: 2 INJECTION INTRAMUSCULAR; INTRAVENOUS at 09:16

## 2022-04-09 RX ADMIN — KETOROLAC TROMETHAMINE 15 MG: 30 INJECTION, SOLUTION INTRAMUSCULAR at 09:17

## 2022-04-09 RX ADMIN — ACETAMINOPHEN 650 MG: 325 TABLET ORAL at 20:53

## 2022-04-09 RX ADMIN — Medication 2 PUFF: at 20:29

## 2022-04-09 ASSESSMENT — PAIN SCALES - GENERAL
PAINLEVEL_OUTOF10: 0
PAINLEVEL_OUTOF10: 9
PAINLEVEL_OUTOF10: 0
PAINLEVEL_OUTOF10: 4
PAINLEVEL_OUTOF10: 9

## 2022-04-09 ASSESSMENT — ENCOUNTER SYMPTOMS
DIARRHEA: 0
CONSTIPATION: 0
BACK PAIN: 1
VOMITING: 0
NAUSEA: 0
SORE THROAT: 0
COUGH: 0
SHORTNESS OF BREATH: 1
ABDOMINAL PAIN: 1

## 2022-04-09 ASSESSMENT — PAIN DESCRIPTION - LOCATION
LOCATION: ABDOMEN

## 2022-04-09 ASSESSMENT — PAIN DESCRIPTION - ORIENTATION
ORIENTATION: UPPER

## 2022-04-09 ASSESSMENT — PAIN DESCRIPTION - PROGRESSION
CLINICAL_PROGRESSION: NOT CHANGED
CLINICAL_PROGRESSION: NOT CHANGED

## 2022-04-09 ASSESSMENT — PAIN DESCRIPTION - DESCRIPTORS
DESCRIPTORS: SHARP
DESCRIPTORS: SHARP

## 2022-04-09 ASSESSMENT — PAIN - FUNCTIONAL ASSESSMENT
PAIN_FUNCTIONAL_ASSESSMENT: PREVENTS OR INTERFERES SOME ACTIVE ACTIVITIES AND ADLS
PAIN_FUNCTIONAL_ASSESSMENT: PREVENTS OR INTERFERES SOME ACTIVE ACTIVITIES AND ADLS
PAIN_FUNCTIONAL_ASSESSMENT: 0-10
PAIN_FUNCTIONAL_ASSESSMENT: 0-10

## 2022-04-09 ASSESSMENT — PAIN DESCRIPTION - PAIN TYPE
TYPE: ACUTE PAIN

## 2022-04-09 ASSESSMENT — PAIN DESCRIPTION - FREQUENCY: FREQUENCY: CONTINUOUS

## 2022-04-09 ASSESSMENT — PAIN DESCRIPTION - ONSET
ONSET: ON-GOING
ONSET: ON-GOING

## 2022-04-09 NOTE — H&P
Hospital Medicine History & Physical      PCP: [address] [place] [address]    Date of Admission: 4/9/2022    Date of Service: Pt seen/examined on 04/09/2022 and Admitted to Inpatient with expected LOS greater than two midnights due to medical therapy. Chief Complaint: Back pain, dysuria, fever and chills      History Of Present Illness:      42 y.o. female who presented to [facility] with worsening abdominal pain and dyspnea, patient was in ED 2 days ago diagnosed with UTI sent home with ciprofloxacin she was taking her ciprofloxacin on Thursday but she did not take it yesterday she did not feel any better, came back to emergency department, complaining of abdominal pain mainly lower abdominal pain 9 out of 10 intensity nonradiating not associated with any nausea or vomiting, no obvious trigger no obvious alleviating or aggravating factors, found to have urinary tract infection and hydronephrosis on imaging, patient will be admitted for further management and treatment. Patient was also complaining of fever and chills at home. To note that she did have UTI in the past    Past Medical History:          Diagnosis Date    Asthma     Diabetes mellitus ([facility] [address]     Hyperlipidemia     Hypertension        Past Surgical History:      History reviewed. No pertinent surgical history. Medications Prior to Admission:      Prior to Admission medications    Medication Sig Start Date End Date Taking?  Authorizing Provider   allopurinol (ZYLOPRIM) 100 MG tablet Take 100 mg by mouth daily 3/17/22   Historical Provider, MD   vitamin D3 (CHOLECALCIFEROL) 125 MCG (5000 UT) TABS tablet Take 1 tablet by mouth daily    Historical Provider, MD   potassium chloride (KLOR-CON M) 20 MEQ extended release tablet Take 1 tablet by mouth daily    Historical Provider, MD   albuterol sulfate  (90 Base) MCG/ACT inhaler Inhale 1 puff into the lungs every 4-6 hours as needed SOB    Historical Provider, MD   atorvastatin (LIPITOR) 40 MG tablet Take 40 mg by mouth daily    Historical Provider, MD   cyclobenzaprine (FLEXERIL) 5 MG tablet Take 5 mg by mouth daily 3/17/22   Historical Provider, MD   K Phos Ballard-Sod Phos Di & Mono (PHOSPHA 250 NEUTRAL) 334015-912 MG TABS Take 1 tablet by mouth in the morning and at bedtime 3/17/22   Historical Provider, MD   metoprolol succinate (TOPROL XL) 50 MG extended release tablet Take 50 mg by mouth daily    Historical Provider, MD   pioglitazone (ACTOS) 15 MG tablet Take 15 mg by mouth daily 3/17/22   Historical Provider, MD   ciprofloxacin (CIPRO) 500 MG tablet Take 1 tablet by mouth 2 times daily for 10 days 4/7/22 4/17/22  Eduarda Del Rosario MD   ibuprofen (ADVIL;MOTRIN) 800 MG tablet Take 1 tablet by mouth every 8 hours as needed for Pain 4/7/22   Eduarda Del Rosario MD   folic acid-pyridoxine-cyanocobalamine (FOLTX) 2.5-25-1 MG TABS tablet Take 1 tablet by mouth daily 8/26/20   Lucian Khalil MD   cloNIDine (CATAPRES) 0.2 MG tablet Take 0.2 mg by mouth 2 times daily    Historical Provider, MD   amLODIPine (NORVASC) 5 MG tablet Take 10 mg by mouth daily    Historical Provider, MD   levothyroxine (SYNTHROID) 175 MCG tablet Take 175 mcg by mouth daily 9/12/14   Historical Provider, MD   budesonide-formoterol (SYMBICORT) 160-4.5 MCG/ACT AERO Inhale 2 puffs into the lungs 2 times daily 8/1/13   Historical Provider, MD       Allergies:  Penicillins    Social History:      The patient currently lives at home    TOBACCO:   reports that she has been smoking cigarettes. She has been smoking about 0.50 packs per day. She has never used smokeless tobacco.  ETOH:   reports current alcohol use. E-Cigarettes/Vaping Use     Questions Responses    E-Cigarette/Vaping Use Never User    Start Date     Passive Exposure     Quit Date     Counseling Given     Comments             Family History:      Reviewed in detail and negative for DM, CAD    History reviewed. No pertinent family history.     REVIEW OF SYSTEMS COMPLETED: Pertinent positives as noted in the HPI. All other systems reviewed and negative. PHYSICAL EXAM PERFORMED:    /70   Pulse 81   Temp 99.2 °F (37.3 °C) (Oral)   Resp 24   Ht 5' 7\" (1.702 m)   Wt (!) 302 lb 4 oz (137.1 kg)   LMP 03/09/2022   SpO2 93%   BMI 47.34 kg/m²     General appearance:  No apparent distress  HEENT:  Normal cephalic  Neck: Supple  Respiratory:  Normal respiratory effort. Clear to auscultation, bilaterally without Rales/Wheezes/Rhonchi. Cardiovascular:  Regular rate and rhythm with normal S1/S2 without murmurs, rubs or gallops. Abdomen: Soft, non-tender, non-distended   Musculoskeletal:  No clubbing, cyanosis   Skin: Skin color, texture, turgor normal.  No rashes or lesions. Neurologic: No focal weakness  Psychiatric:  Alert and oriented  Capillary Refill: Brisk,3 seconds, normal  Peripheral Pulses: +2 palpable, equal bilaterally       Labs:     Recent Labs     04/09/22 0905   WBC 4.3   HGB 11.3*   HCT 33.4*        Recent Labs     04/09/22 0905   *   K 3.9      CO2 18*   BUN 38*   CREATININE 3.8*   CALCIUM 9.1     Recent Labs     04/09/22 0905   AST 20   ALT 14   BILITOT 0.9   ALKPHOS 205*     No results for input(s): INR in the last 72 hours. No results for input(s): Towana Ball in the last 72 hours. Urinalysis:      Lab Results   Component Value Date    NITRU Negative 04/09/2022    WBCUA >900 04/09/2022    BACTERIA 2+ 04/09/2022    RBCUA see below 04/09/2022    BLOODU MODERATE 04/09/2022    SPECGRAV 1.020 04/09/2022    GLUCOSEU Negative 04/09/2022       Radiology:     EKG:  I have reviewed the EKG with the following interpretation: No ST elevation    CT ABDOMEN PELVIS WO CONTRAST Additional Contrast? None   Preliminary Result   1. 3 mm right UVJ calculus with moderate hydronephrosis. Additional   nonobstructive lower pole 5 mm calculus on the right. 2. Hepatomegaly with mild heterogeneity of the hepatic parenchyma.    3. No acute bowel pathology. ASSESSMENT:    Active Hospital Problems    Diagnosis Date Noted    UTI (urinary tract infection) [N39.0] 04/09/2022    Obstructed, uropathy [N13.9] 04/09/2022    Elevated lactic acid level [R79.89] 12/78/4659    Complicated UTI (urinary tract infection) [N39.0] 04/09/2022    CKD (chronic kidney disease) [N18.9] 04/09/2022    MIN (acute kidney injury) ([facility] [address] [N17.9] 08/23/2020    Morbid (severe) obesity due to excess calories ([facility] [address] [E66.01] 08/23/2020    Type 2 diabetes mellitus ([facility] [address] [E11.9] 08/23/2020    Anemia [D64.9] 08/23/2020         PLAN:    1.  UTI, complicated, with obstructive uropathy, did receive p.o. antibiotics in the last 3 days, and did have UTI in the past not in the last few months ago, still I will start her on meropenem at this time will de-escalate based on urine culture. Urology consulted from ED. I will add procalcitonin. 2.  Suspecting sepsis even though patient does not completely meet SIRS criteria she stated she had fever and chills at home, on presentation her respiratory test 24 but again she did receive some antibiotics which might, flash the clinical picture, will continue to monitor at this time her lactic acid was elevated and she did receive IV fluid and repeated lactic within normal limits  3. Obstructive uropathy, urology consulted  4. Acute on chronic kidney injury likely aggravated by obstructive uropathy, urology consulted as above  5. Elevated lactic acid, improved with IV fluid  6. Diabetes mellitus, sliding scale  7. Anemia, chronic, follow-up as outpatient  8. Morbid obesity, complicating current presentation and increasing morbidity, counseled for weight loss    DVT Prophylaxis: Lovenox  Diet: No diet orders on file  Code Status: Prior        Dispo -inpatient 2 to 3 days       Lucian Meier MD    Thank you [address] [place] [address] for the opportunity to be involved in this patient's care.  If you have any questions or concerns please feel free to contact me at [phone].

## 2022-04-09 NOTE — PROGRESS NOTES
Medication Reconciliation     List of medications patient is currently taking is complete. Source of information:   1. Conversation with patient at bedside  2. EPIC records        Notes regarding home medications:  1. Patient received all of her morning home medications today. 2. Added: pioglitazone, metoprolol, cyclobenzaprine, vit D3, albuterol, allopurinol, potassium, and phospha to pt med list.  3. Patient d/c ciprofloxacin yesterday because of undesired side effects. Start date was 4/07/22 with end date of 4/19/22. Patient only took 3-4 doses.   4. No additional OTC/supplements    Bennie Biggs, Pharmacy Intern  4/9/2022 11:10 AM

## 2022-04-09 NOTE — ED PROVIDER NOTES
[facility]      Pt Name: Beverlee Bumpers  MRN: [id number]  [place] 1975  Date of evaluation: 4/9/2022  Provider: Tawana Snider PA-C    This patient was not seen and evaluated by the attending physician Eric Kelsey MD.    CHIEF COMPLAINT      Chief Complaint: abdominal pain, shortness of breath      CRITICAL CARE TIME   Total Critical Care time was 45 minutes, excluding separately reportable procedures. There was a high probability of clinically significant/life threatening deterioration in the patient's condition which required my urgent intervention. HISTORYOF PRESENT ILLNESS  (Location/Symptom, Timing/Onset, Context/Setting, Quality, Duration, Modifying Factors, Severity.)   Beverlee Bumpers is a 42 y.o. female with a past medical history of hypertension, hyperlipidemia, diabetes, asthma, morbid obesity who presents to the emergency department complaining of abdominal pain and shortness of breath. The patient was seen here 2 days ago for symptoms of a urinary tract infection. She was diagnosed with pyelonephritis and started on Cipro. No imaging was performed. She states since being seen her symptoms have worsened and now she has developed generalized abdominal discomfort. Pain is rated 9 out of 10. It is constant. Nothing makes it better or worse. She also reports feeling short of breath and has had chills. She denies any cough or fever or vomiting. Nursing Notes were reviewed and I agree. REVIEW OF SYSTEMS    (2-9 systems for level 4, 10 or more forlevel 5)     Review of Systems   Constitutional: Positive for chills. Negative for fever. HENT: Negative for sore throat. Respiratory: Positive for shortness of breath. Negative for cough. Cardiovascular: Negative for chest pain. Gastrointestinal: Positive for abdominal pain. Negative for constipation, diarrhea, nausea and vomiting.    Genitourinary: Positive for difficulty urinating and dysuria. Musculoskeletal: Positive for back pain. Skin: Negative for rash. Neurological: Negative for light-headedness and headaches. Psychiatric/Behavioral: The patient is not nervous/anxious. All other systems reviewed and are negative. Positives and Pertinent negatives as per HPI. Except as noted above the remainder of the review of systems was reviewed and negative. PAST MEDICALHISTORY         Diagnosis Date    Asthma     Diabetes mellitus ([facility] [address]     Hyperlipidemia     Hypertension        SURGICAL HISTORY     History reviewed. No pertinent surgical history.     CURRENT MEDICATIONS       Previous Medications    ALBUTEROL SULFATE  (90 BASE) MCG/ACT INHALER    Inhale 1 puff into the lungs every 4-6 hours as needed SOB    ALLOPURINOL (ZYLOPRIM) 100 MG TABLET    Take 100 mg by mouth daily    AMLODIPINE (NORVASC) 5 MG TABLET    Take 10 mg by mouth daily    ATORVASTATIN (LIPITOR) 40 MG TABLET    Take 40 mg by mouth daily    BUDESONIDE-FORMOTEROL (SYMBICORT) 160-4.5 MCG/ACT AERO    Inhale 2 puffs into the lungs 2 times daily    CIPROFLOXACIN (CIPRO) 500 MG TABLET    Take 1 tablet by mouth 2 times daily for 10 days    CLONIDINE (CATAPRES) 0.2 MG TABLET    Take 0.2 mg by mouth 2 times daily    CYCLOBENZAPRINE (FLEXERIL) 5 MG TABLET    Take 5 mg by mouth daily    FOLIC ACID-PYRIDOXINE-CYANOCOBALAMINE (FOLTX) 2.5-25-1 MG TABS TABLET    Take 1 tablet by mouth daily    IBUPROFEN (ADVIL;MOTRIN) 800 MG TABLET    Take 1 tablet by mouth every 8 hours as needed for Pain    K PHOS MONO-SOD PHOS DI & MONO (PHOSPHA 250 NEUTRAL) 155-852-130 MG TABS    Take 1 tablet by mouth in the morning and at bedtime    LEVOTHYROXINE (SYNTHROID) 175 MCG TABLET    Take 175 mcg by mouth daily    METOPROLOL SUCCINATE (TOPROL XL) 50 MG EXTENDED RELEASE TABLET    Take 50 mg by mouth daily    PIOGLITAZONE (ACTOS) 15 MG TABLET    Take 15 mg by mouth daily    POTASSIUM CHLORIDE (KLOR-CON M) 20 MEQ EXTENDED RELEASE TABLET    Take 1 tablet by mouth daily    VITAMIN D3 (CHOLECALCIFEROL) 125 MCG (5000 UT) TABS TABLET    Take 1 tablet by mouth daily       ALLERGIES     Penicillins    FAMILY HISTORY     History reviewed. No pertinent family history. No family status information on file. SOCIAL HISTORY    reports that she has been smoking cigarettes. She has been smoking about 0.50 packs per day. She has never used smokeless tobacco. She reports current alcohol use. She reports previous drug use. PHYSICAL EXAM    (up to 7 for level 4, 8 or more for level 5)     ED Triage Vitals [04/09/22 0753]   BP Temp Temp Source Pulse Resp SpO2 Height Weight   121/78 99.2 °F (37.3 °C) Oral 76 24 96 % 5' 7\" (1.702 m) (!) 302 lb 4 oz (137.1 kg)       Physical Exam  Vitals and nursing note reviewed. Constitutional:       General: She is not in acute distress. Appearance: She is well-developed. HENT:      Head: Normocephalic and atraumatic. Cardiovascular:      Rate and Rhythm: Normal rate and regular rhythm. Heart sounds: Normal heart sounds. Pulmonary:      Effort: Pulmonary effort is normal. Tachypnea (mild tachypnea, speaks in full sentences) present. No respiratory distress. Breath sounds: Normal breath sounds. Abdominal:      Tenderness: There is abdominal tenderness (diffuse). There is guarding. Musculoskeletal:         General: Normal range of motion. Cervical back: Neck supple. Skin:     General: Skin is warm and dry. Neurological:      Mental Status: She is alert and oriented to person, place, and time.    Psychiatric:         Behavior: Behavior normal.            DIAGNOSTIC RESULTS     When ordered, EKGs are interpreted by the Emergency Department Physician in the absence of a cardiologist. Please see their note for interpretation of EKG    RADIOLOGY:         Interpretation per the Radiologist below, if available at the time of this note:    [address] CONTRAST Additional Contrast? None   Preliminary Result   1. 3 mm right UVJ calculus with moderate hydronephrosis. Additional   nonobstructive lower pole 5 mm calculus on the right. 2. Hepatomegaly with mild heterogeneity of the hepatic parenchyma. 3. No acute bowel pathology. LABS:  Labs Reviewed   CBC WITH AUTO DIFFERENTIAL - Abnormal; Notable for the following components:       Result Value    RBC 3.27 (*)     Hemoglobin 11.3 (*)     Hematocrit 33.4 (*)     .1 (*)     MCH 34.5 (*)     Lymphocytes Absolute 0.2 (*)     Macrocytes 1+ (*)     All other components within normal limits   COMPREHENSIVE METABOLIC PANEL W/ REFLEX TO MG FOR LOW K - Abnormal; Notable for the following components:    Sodium 135 (*)     CO2 18 (*)     Glucose 114 (*)     BUN 38 (*)     CREATININE 3.8 (*)     GFR Non- 13 (*)     GFR  16 (*)     Albumin 2.7 (*)     Albumin/Globulin Ratio 0.6 (*)     Alkaline Phosphatase 205 (*)     All other components within normal limits   LIPASE - Abnormal; Notable for the following components:    Lipase 12.0 (*)     All other components within normal limits   URINALYSIS WITH REFLEX TO CULTURE - Abnormal; Notable for the following components:    Blood, Urine MODERATE (*)     Protein, UA >=300 (*)     Urobilinogen, Urine 4.0 (*)     Leukocyte Esterase, Urine MODERATE (*)     All other components within normal limits   LACTIC ACID - Abnormal; Notable for the following components:    Lactic Acid 2.4 (*)     All other components within normal limits   MICROSCOPIC URINALYSIS - Abnormal; Notable for the following components:    RBC, UA see below (*)     Bacteria, UA 2+ (*)     WBC, UA >900 (*)     Epithelial Cells, UA 13 (*)     All other components within normal limits   CULTURE, URINE   CULTURE, BLOOD 2   CULTURE, BLOOD 1   HCG, SERUM, QUALITATIVE   LACTIC ACID       When ordered, only abnormal lab results are displayed.  All other labs are within normal range or not returned as of the dictation. EMERGENCY DEPARTMENT COURSE and DIFFERENTIAL DIAGNOSIS/MDM:   Vitals:    Vitals:    04/09/22 0945 04/09/22 1012 04/09/22 1030 04/09/22 1210   BP:  113/68 109/63 110/70   Pulse: 81      Resp: 24      Temp:       TempSrc:       SpO2: 94% 95% 93%    Weight:       Height:            I have evaluated this patient. My supervising physician was available for consultation. Her presentation is concerning for sepsis. Her pain was well controlled with Toradol. Cultures were sent and she was treated with Rocephin. She did receive 30 cc/kg fluid bolus based on ideal body weight secondary to obesity, BMI is 47. Her urine culture from 4/7 is pending. Urinalysis today shows moderate leukocytes and greater than 900 white blood cells with 2+ bacteria. She has a normal white blood cell count but 6% bands, stable H&H, grossly normal electrolytes. Her glucose is 114. BUN is 38, creatinine is 3.8. This is presumed to be new when compared to labs from 2020. Toradol was given prior to this realization and will be held from here on out. Her initial lactate was 2.4, repeat was normal.  She went for CT abdomen and pelvis and this showed a 3 mm right UVJ calculus with moderate hydronephrosis. I discussed her presentation and work-up with the on-call urologist Dr. Kalee Mai who recommended that the patient be n.p.o. after midnight and admitted to the hospitalist. Discussed the case with Dr. Rabia Gonsales the hospitalist who will admit for further care. PROCEDURES:  None    FINAL IMPRESSION      1. Septicemia ([address]    2. Ureteral stone with hydronephrosis    3. Acute cystitis with hematuria    4. Acute kidney injury [facility])          DISPOSITION/PLAN   DISPOSITION Admitted 04/09/2022 12:46:47 PM      PATIENT REFERRED TO:  No follow-up provider specified.     MEDICATIONS:  New Prescriptions    No medications on file       (Please note that portions of this note were completed with a voice recognition program.  Efforts were made toedit the dictations but occasionally words are mis-transcribed.)    PIO Alejandro PA-C  04/09/22 5112

## 2022-04-09 NOTE — ED PROVIDER NOTES
I did not perform an evaluation of Yuli Lester but was asked to review her EKG. EKG interpreted by myself. Sinus tachycardia with a rate of 110, normal axis, , QRS 76, QTc 457, no ST elevations, nonspecific ST changes, rhythm change from normal sinus rhythm to sinus tachycardia with compared EKG from 8/23/2020 otherwise no acute changes.      Julia Jaramillo MD  04/09/22 2251

## 2022-04-09 NOTE — PROGRESS NOTES
Report received from kristen RN. Patient resting comfortably in bed. No signs of discomfort or distress. Bed is in lowest position, wheels locked, 2/2 side rails up. Bedside table and call light within reach. White board updated. Will continue to monitor patient. Keerthi Grace RN    9:05 PM  Shift assessment complete. (See findings in flowsheet). Med pass complete. (See MAR). VSS. Patient with no complaints at this time. Plan is to take patient for cysto/stent placement in am. Patient states she has not seen MD, spoken to them about this yet. Consent printed, but not signed, as patient wishes to speak to the MD first. Keerthi Grace RN    10:54 PM  Patient resting quietly in bed with eyes closed, no apparent needs at this time. Keerthi Grace RN    2:40 AM  Patient resting in bed quietly with eyes closed. No needs at this time. Keerthi Grace RN    6:09 AM  Patient resting quietly in bed, watching television. No needs at this time.  Keerthi Grace RN

## 2022-04-09 NOTE — CONSULTS
Thanks for consulting [facility] for the care of Erick Yuan. Has MIN, sepsis  Obstructive uropathy- urology consulted  meds reviewed  On iv fluids    BP low side- adjust meds  Decrease clonidine dose and hold amlodipine      Full consult will follow  Please call with questions at-  24 Hrs Answering service [phone]  Perfect serve, or cell phone 7 am - [address] [facility], MD   [facility]  [facility]. [facility]

## 2022-04-09 NOTE — LETTER
[address]  Phone: [phone]             April 13, 2022    Patient: Magdi Escobar   YOB: 1975   Date of Visit: 4/9/2022       To Whom It May Concern:    Flash Garcia was seen and treated in our facility  beginning 4/9/2022 until 4/13/2022. She may return to work on Monday 4/18/2022 without restrictions. .      Sincerely,           Signature:__________________________________

## 2022-04-10 ENCOUNTER — ANESTHESIA (OUTPATIENT)
Dept: OPERATING ROOM | Age: 47
DRG: 854 | End: 2022-04-10
Payer: COMMERCIAL

## 2022-04-10 ENCOUNTER — ANESTHESIA EVENT (OUTPATIENT)
Dept: OPERATING ROOM | Age: 47
DRG: 854 | End: 2022-04-10
Payer: COMMERCIAL

## 2022-04-10 VITALS
DIASTOLIC BLOOD PRESSURE: 72 MMHG | SYSTOLIC BLOOD PRESSURE: 139 MMHG | OXYGEN SATURATION: 100 % | RESPIRATION RATE: 24 BRPM

## 2022-04-10 LAB
A/G RATIO: 0.7 (ref 1.1–2.2)
ALBUMIN SERPL-MCNC: 2.5 G/DL (ref 3.4–5)
ALP BLD-CCNC: 156 U/L (ref 40–129)
ALT SERPL-CCNC: 17 U/L (ref 10–40)
ANION GAP SERPL CALCULATED.3IONS-SCNC: 17 MMOL/L (ref 3–16)
AST SERPL-CCNC: 25 U/L (ref 15–37)
BASOPHILS ABSOLUTE: 0 K/UL (ref 0–0.2)
BASOPHILS RELATIVE PERCENT: 0.1 %
BILIRUB SERPL-MCNC: 0.8 MG/DL (ref 0–1)
BUN BLDV-MCNC: 43 MG/DL (ref 7–20)
CALCIUM SERPL-MCNC: 8.7 MG/DL (ref 8.3–10.6)
CHLORIDE BLD-SCNC: 105 MMOL/L (ref 99–110)
CO2: 17 MMOL/L (ref 21–32)
CREAT SERPL-MCNC: 3.6 MG/DL (ref 0.6–1.1)
EKG ATRIAL RATE: 110 BPM
EKG DIAGNOSIS: NORMAL
EKG P AXIS: 58 DEGREES
EKG P-R INTERVAL: 168 MS
EKG Q-T INTERVAL: 338 MS
EKG QRS DURATION: 76 MS
EKG QTC CALCULATION (BAZETT): 457 MS
EKG R AXIS: 24 DEGREES
EKG T AXIS: 28 DEGREES
EKG VENTRICULAR RATE: 110 BPM
EOSINOPHILS ABSOLUTE: 0.4 K/UL (ref 0–0.6)
EOSINOPHILS RELATIVE PERCENT: 3.2 %
ESTIMATED AVERAGE GLUCOSE: 128.4 MG/DL
GFR AFRICAN AMERICAN: 17
GFR NON-AFRICAN AMERICAN: 14
GLUCOSE BLD-MCNC: 110 MG/DL (ref 70–99)
GLUCOSE BLD-MCNC: 110 MG/DL (ref 70–99)
GLUCOSE BLD-MCNC: 118 MG/DL (ref 70–99)
GLUCOSE BLD-MCNC: 191 MG/DL (ref 70–99)
GLUCOSE BLD-MCNC: 208 MG/DL (ref 70–99)
GLUCOSE BLD-MCNC: 232 MG/DL (ref 70–99)
HBA1C MFR BLD: 6.1 %
HCT VFR BLD CALC: 31.7 % (ref 36–48)
HEMOGLOBIN: 10.5 G/DL (ref 12–16)
LYMPHOCYTES ABSOLUTE: 0.7 K/UL (ref 1–5.1)
LYMPHOCYTES RELATIVE PERCENT: 4.9 %
MCH RBC QN AUTO: 34 PG (ref 26–34)
MCHC RBC AUTO-ENTMCNC: 33.2 G/DL (ref 31–36)
MCV RBC AUTO: 102.4 FL (ref 80–100)
MONOCYTES ABSOLUTE: 0.5 K/UL (ref 0–1.3)
MONOCYTES RELATIVE PERCENT: 3.9 %
NEUTROPHILS ABSOLUTE: 11.7 K/UL (ref 1.7–7.7)
NEUTROPHILS RELATIVE PERCENT: 87.9 %
ORGANISM: ABNORMAL
PDW BLD-RTO: 14.4 % (ref 12.4–15.4)
PERFORMED ON: ABNORMAL
PLATELET # BLD: 231 K/UL (ref 135–450)
PMV BLD AUTO: 9 FL (ref 5–10.5)
POTASSIUM REFLEX MAGNESIUM: 3.8 MMOL/L (ref 3.5–5.1)
RBC # BLD: 3.1 M/UL (ref 4–5.2)
SODIUM BLD-SCNC: 139 MMOL/L (ref 136–145)
TOTAL PROTEIN: 5.9 G/DL (ref 6.4–8.2)
URINE CULTURE, ROUTINE: ABNORMAL
URINE CULTURE, ROUTINE: ABNORMAL
WBC # BLD: 13.3 K/UL (ref 4–11)

## 2022-04-10 PROCEDURE — 6360000002 HC RX W HCPCS: Performed by: INTERNAL MEDICINE

## 2022-04-10 PROCEDURE — BT1D1ZZ FLUOROSCOPY OF RIGHT KIDNEY, URETER AND BLADDER USING LOW OSMOLAR CONTRAST: ICD-10-PCS | Performed by: UROLOGY

## 2022-04-10 PROCEDURE — 3700000000 HC ANESTHESIA ATTENDED CARE: Performed by: UROLOGY

## 2022-04-10 PROCEDURE — 94640 AIRWAY INHALATION TREATMENT: CPT

## 2022-04-10 PROCEDURE — 80053 COMPREHEN METABOLIC PANEL: CPT

## 2022-04-10 PROCEDURE — 93010 ELECTROCARDIOGRAM REPORT: CPT | Performed by: INTERNAL MEDICINE

## 2022-04-10 PROCEDURE — C2617 STENT, NON-COR, TEM W/O DEL: HCPCS | Performed by: UROLOGY

## 2022-04-10 PROCEDURE — 6370000000 HC RX 637 (ALT 250 FOR IP): Performed by: ANESTHESIOLOGY

## 2022-04-10 PROCEDURE — 7100000000 HC PACU RECOVERY - FIRST 15 MIN: Performed by: UROLOGY

## 2022-04-10 PROCEDURE — 6370000000 HC RX 637 (ALT 250 FOR IP): Performed by: UROLOGY

## 2022-04-10 PROCEDURE — 3600000014 HC SURGERY LEVEL 4 ADDTL 15MIN: Performed by: UROLOGY

## 2022-04-10 PROCEDURE — 2580000003 HC RX 258: Performed by: INTERNAL MEDICINE

## 2022-04-10 PROCEDURE — 2709999900 HC NON-CHARGEABLE SUPPLY: Performed by: UROLOGY

## 2022-04-10 PROCEDURE — 6360000002 HC RX W HCPCS: Performed by: UROLOGY

## 2022-04-10 PROCEDURE — 2700000000 HC OXYGEN THERAPY PER DAY

## 2022-04-10 PROCEDURE — 36415 COLL VENOUS BLD VENIPUNCTURE: CPT

## 2022-04-10 PROCEDURE — 7100000001 HC PACU RECOVERY - ADDTL 15 MIN: Performed by: UROLOGY

## 2022-04-10 PROCEDURE — 2580000003 HC RX 258: Performed by: UROLOGY

## 2022-04-10 PROCEDURE — 2500000003 HC RX 250 WO HCPCS: Performed by: ANESTHESIOLOGY

## 2022-04-10 PROCEDURE — 85025 COMPLETE CBC W/AUTO DIFF WBC: CPT

## 2022-04-10 PROCEDURE — 3600000004 HC SURGERY LEVEL 4 BASE: Performed by: UROLOGY

## 2022-04-10 PROCEDURE — 6360000004 HC RX CONTRAST MEDICATION: Performed by: UROLOGY

## 2022-04-10 PROCEDURE — 3700000001 HC ADD 15 MINUTES (ANESTHESIA): Performed by: UROLOGY

## 2022-04-10 PROCEDURE — 0T768DZ DILATION OF RIGHT URETER WITH INTRALUMINAL DEVICE, VIA NATURAL OR ARTIFICIAL OPENING ENDOSCOPIC: ICD-10-PCS | Performed by: UROLOGY

## 2022-04-10 PROCEDURE — 6360000002 HC RX W HCPCS: Performed by: ANESTHESIOLOGY

## 2022-04-10 PROCEDURE — 2500000003 HC RX 250 WO HCPCS: Performed by: UROLOGY

## 2022-04-10 PROCEDURE — 1200000000 HC SEMI PRIVATE

## 2022-04-10 PROCEDURE — 94761 N-INVAS EAR/PLS OXIMETRY MLT: CPT

## 2022-04-10 PROCEDURE — 6370000000 HC RX 637 (ALT 250 FOR IP): Performed by: INTERNAL MEDICINE

## 2022-04-10 DEVICE — URETERAL STENT
Type: IMPLANTABLE DEVICE | Site: URETER | Status: FUNCTIONAL
Brand: CONTOUR™

## 2022-04-10 RX ORDER — SODIUM CHLORIDE 9 MG/ML
INJECTION, SOLUTION INTRAVENOUS PRN
Status: DISCONTINUED | OUTPATIENT
Start: 2022-04-10 | End: 2022-04-10 | Stop reason: HOSPADM

## 2022-04-10 RX ORDER — LIDOCAINE HYDROCHLORIDE 20 MG/ML
INJECTION, SOLUTION EPIDURAL; INFILTRATION; INTRACAUDAL; PERINEURAL PRN
Status: DISCONTINUED | OUTPATIENT
Start: 2022-04-10 | End: 2022-04-10 | Stop reason: SDUPTHER

## 2022-04-10 RX ORDER — DEXAMETHASONE SODIUM PHOSPHATE 4 MG/ML
INJECTION, SOLUTION INTRA-ARTICULAR; INTRALESIONAL; INTRAMUSCULAR; INTRAVENOUS; SOFT TISSUE PRN
Status: DISCONTINUED | OUTPATIENT
Start: 2022-04-10 | End: 2022-04-10 | Stop reason: SDUPTHER

## 2022-04-10 RX ORDER — SUCCINYLCHOLINE/SOD CL,ISO/PF 200MG/10ML
SYRINGE (ML) INTRAVENOUS PRN
Status: DISCONTINUED | OUTPATIENT
Start: 2022-04-10 | End: 2022-04-10 | Stop reason: SDUPTHER

## 2022-04-10 RX ORDER — MAGNESIUM HYDROXIDE 1200 MG/15ML
LIQUID ORAL
Status: COMPLETED | OUTPATIENT
Start: 2022-04-10 | End: 2022-04-10

## 2022-04-10 RX ORDER — PROPOFOL 10 MG/ML
INJECTION, EMULSION INTRAVENOUS PRN
Status: DISCONTINUED | OUTPATIENT
Start: 2022-04-10 | End: 2022-04-10 | Stop reason: SDUPTHER

## 2022-04-10 RX ORDER — ALBUTEROL SULFATE 90 UG/1
AEROSOL, METERED RESPIRATORY (INHALATION) PRN
Status: DISCONTINUED | OUTPATIENT
Start: 2022-04-10 | End: 2022-04-10 | Stop reason: SDUPTHER

## 2022-04-10 RX ORDER — SODIUM CHLORIDE 0.9 % (FLUSH) 0.9 %
5-40 SYRINGE (ML) INJECTION PRN
Status: DISCONTINUED | OUTPATIENT
Start: 2022-04-10 | End: 2022-04-10 | Stop reason: HOSPADM

## 2022-04-10 RX ORDER — IPRATROPIUM BROMIDE AND ALBUTEROL SULFATE 2.5; .5 MG/3ML; MG/3ML
1 SOLUTION RESPIRATORY (INHALATION) ONCE
Status: COMPLETED | OUTPATIENT
Start: 2022-04-10 | End: 2022-04-10

## 2022-04-10 RX ORDER — FENTANYL CITRATE 50 UG/ML
INJECTION, SOLUTION INTRAMUSCULAR; INTRAVENOUS PRN
Status: DISCONTINUED | OUTPATIENT
Start: 2022-04-10 | End: 2022-04-10 | Stop reason: SDUPTHER

## 2022-04-10 RX ORDER — SODIUM CHLORIDE 0.9 % (FLUSH) 0.9 %
5-40 SYRINGE (ML) INJECTION EVERY 12 HOURS SCHEDULED
Status: DISCONTINUED | OUTPATIENT
Start: 2022-04-10 | End: 2022-04-10 | Stop reason: HOSPADM

## 2022-04-10 RX ORDER — ONDANSETRON 2 MG/ML
4 INJECTION INTRAMUSCULAR; INTRAVENOUS
Status: DISCONTINUED | OUTPATIENT
Start: 2022-04-10 | End: 2022-04-10 | Stop reason: HOSPADM

## 2022-04-10 RX ORDER — OXYCODONE HYDROCHLORIDE 5 MG/1
5 TABLET ORAL
Status: DISCONTINUED | OUTPATIENT
Start: 2022-04-10 | End: 2022-04-10 | Stop reason: HOSPADM

## 2022-04-10 RX ORDER — FENTANYL CITRATE 50 UG/ML
25 INJECTION, SOLUTION INTRAMUSCULAR; INTRAVENOUS EVERY 5 MIN PRN
Status: DISCONTINUED | OUTPATIENT
Start: 2022-04-10 | End: 2022-04-10 | Stop reason: HOSPADM

## 2022-04-10 RX ORDER — MEROPENEM 500 MG/1
INJECTION, POWDER, FOR SOLUTION INTRAVENOUS PRN
Status: DISCONTINUED | OUTPATIENT
Start: 2022-04-10 | End: 2022-04-10 | Stop reason: SDUPTHER

## 2022-04-10 RX ORDER — ONDANSETRON 2 MG/ML
INJECTION INTRAMUSCULAR; INTRAVENOUS PRN
Status: DISCONTINUED | OUTPATIENT
Start: 2022-04-10 | End: 2022-04-10 | Stop reason: SDUPTHER

## 2022-04-10 RX ADMIN — IPRATROPIUM BROMIDE AND ALBUTEROL SULFATE 1 AMPULE: 2.5; .5 SOLUTION RESPIRATORY (INHALATION) at 09:28

## 2022-04-10 RX ADMIN — ACETAMINOPHEN 650 MG: 325 TABLET ORAL at 21:53

## 2022-04-10 RX ADMIN — DEXAMETHASONE SODIUM PHOSPHATE 10 MG: 4 INJECTION, SOLUTION INTRAMUSCULAR; INTRAVENOUS at 08:48

## 2022-04-10 RX ADMIN — CYCLOBENZAPRINE 5 MG: 10 TABLET, FILM COATED ORAL at 14:03

## 2022-04-10 RX ADMIN — ACETAMINOPHEN 650 MG: 325 TABLET ORAL at 14:06

## 2022-04-10 RX ADMIN — MEROPENEM 500 MG: 500 INJECTION, POWDER, FOR SOLUTION INTRAVENOUS at 16:40

## 2022-04-10 RX ADMIN — CLONIDINE HYDROCHLORIDE 0.1 MG: 0.1 TABLET ORAL at 21:49

## 2022-04-10 RX ADMIN — MEROPENEM 500 MG: 500 INJECTION, POWDER, FOR SOLUTION INTRAVENOUS at 01:12

## 2022-04-10 RX ADMIN — MEROPENEM 500 MG: 500 INJECTION, POWDER, FOR SOLUTION INTRAVENOUS at 08:27

## 2022-04-10 RX ADMIN — LIDOCAINE HYDROCHLORIDE 100 MG: 20 INJECTION, SOLUTION EPIDURAL; INFILTRATION; INTRACAUDAL; PERINEURAL at 08:48

## 2022-04-10 RX ADMIN — MEROPENEM 500 MG: 500 INJECTION, POWDER, FOR SOLUTION INTRAVENOUS at 08:46

## 2022-04-10 RX ADMIN — ALBUTEROL SULFATE 4 PUFF: 90 AEROSOL, METERED RESPIRATORY (INHALATION) at 08:51

## 2022-04-10 RX ADMIN — INSULIN LISPRO 2 UNITS: 100 INJECTION, SOLUTION INTRAVENOUS; SUBCUTANEOUS at 16:54

## 2022-04-10 RX ADMIN — Medication 160 MG: at 08:48

## 2022-04-10 RX ADMIN — FENTANYL CITRATE 50 MCG: 50 INJECTION INTRAMUSCULAR; INTRAVENOUS at 08:48

## 2022-04-10 RX ADMIN — Medication 2 PUFF: at 20:28

## 2022-04-10 RX ADMIN — INSULIN LISPRO 1 UNITS: 100 INJECTION, SOLUTION INTRAVENOUS; SUBCUTANEOUS at 21:53

## 2022-04-10 RX ADMIN — PROPOFOL 160 MG: 10 INJECTION, EMULSION INTRAVENOUS at 08:48

## 2022-04-10 RX ADMIN — SODIUM BICARBONATE: 84 INJECTION, SOLUTION INTRAVENOUS at 11:59

## 2022-04-10 RX ADMIN — ONDANSETRON 4 MG: 2 INJECTION INTRAMUSCULAR; INTRAVENOUS at 08:48

## 2022-04-10 RX ADMIN — METOPROLOL SUCCINATE 50 MG: 50 TABLET, EXTENDED RELEASE ORAL at 14:03

## 2022-04-10 RX ADMIN — ATORVASTATIN CALCIUM 40 MG: 40 TABLET, FILM COATED ORAL at 21:49

## 2022-04-10 RX ADMIN — Medication 2 PUFF: at 09:34

## 2022-04-10 RX ADMIN — ALLOPURINOL 100 MG: 100 TABLET ORAL at 14:03

## 2022-04-10 RX ADMIN — FENTANYL CITRATE 50 MCG: 50 INJECTION INTRAMUSCULAR; INTRAVENOUS at 08:51

## 2022-04-10 ASSESSMENT — PULMONARY FUNCTION TESTS
PIF_VALUE: 0
PIF_VALUE: 8
PIF_VALUE: 2
PIF_VALUE: 12
PIF_VALUE: 4
PIF_VALUE: 2
PIF_VALUE: 39
PIF_VALUE: 16
PIF_VALUE: 35
PIF_VALUE: 42
PIF_VALUE: 2
PIF_VALUE: 57
PIF_VALUE: 39
PIF_VALUE: 2
PIF_VALUE: 6
PIF_VALUE: 16
PIF_VALUE: 16
PIF_VALUE: 37
PIF_VALUE: 39
PIF_VALUE: 14
PIF_VALUE: 13
PIF_VALUE: 13
PIF_VALUE: 17
PIF_VALUE: 0
PIF_VALUE: 0
PIF_VALUE: 13
PIF_VALUE: 18
PIF_VALUE: 0
PIF_VALUE: 40
PIF_VALUE: 39
PIF_VALUE: 40

## 2022-04-10 ASSESSMENT — PAIN SCALES - GENERAL
PAINLEVEL_OUTOF10: 3
PAINLEVEL_OUTOF10: 3
PAINLEVEL_OUTOF10: 4
PAINLEVEL_OUTOF10: 0

## 2022-04-10 NOTE — OP NOTE
[address]           [address] [facility]                                OPERATIVE REPORT    PATIENT NAME: Ivelisse Otero                     :        1975  MED REC NO:   [id number]                          ROOM:       4126  ACCOUNT NO:   [de-identified]                           ADMIT DATE: 2022  PROVIDER:     Jose Manuel Vicente MD    DATE OF PROCEDURE:  04/10/2022    PREOPERATIVE DIAGNOSES:  Right ureteral calculus and acute kidney  injury. POSTOPERATIVE DIAGNOSES:  Right ureteral calculus and acute kidney  injury. OPERATION PERFORMED:  Cystoscopy, right retrograde pyelogram, right  ureteral stent insertion. ANESTHESIA:  General.    COMPLICATIONS:  None. BLOOD LOSS:  Minimal.    INDICATIONS:  A 29-year-old female who initially presented to the ER  earlier this week with complaints of abdominal pain and urinary  frequency. She was treated for urinary tract infection and discharged  home. She came back yesterday with continued severe right-sided  abdominal pain as well as a jump in her creatinine to 3.7 from her  baseline of 1.5. A CAT scan showed a 3-mm obstructing right ureteral  calculus. Her pain is better this morning but she has not had much  improvement in her renal function. She is here for stent insertion. OPERATIVE PROCEDURE:  She was already on meropenem. She was taken to  the cystoscopy suite. She moved onto the table. Anesthesia was  induced. Her position was changed to the lithotomy position. Her  genitalia were prepped and draped. The 21-Pashto cystoscope advanced  easily through the urethra into the bladder. The bladder showed no  abnormalities. A 0.035 ZIPwire was advanced into the right ureteral  orifice. A Reeder catheter was advanced over the wire and a retrograde  pyelogram was performed to delineate the anatomy.   The ureter course  quite medially and there was moderate hydronephrosis. The wire was  advanced then through the Laveen catheter and the Laveen was  withdrawn. A 6-Italian x 24-cm double-J stent was then advanced over the wire in the  typical fashion. The wire was withdrawn. Fluoroscopy showed the stent  was in the appropriate position. The bladder was then drained and the  scope was withdrawn. She was awakened and transferred to recovery  having tolerated the procedure well. Will arrange definitive  ureteroscopy in 1-2 weeks.         Hardeep Joshua MD    D: 04/10/2022 9:30:13       T: 04/10/2022 9:33:55     [id number]  Job#: [id number]     Doc#: [id number]    CC:

## 2022-04-10 NOTE — H&P (VIEW-ONLY)
Consulting Physician: Taisha Ferrara    Reason for Consult: right flank pain    History of Present Illness: Tuan Cloud is a 42 y.o. diabetic female who initially presented Thursday 4/7/22 with abdominal pain and frequency. She was diagnosed with a UTi and sent home on cipro. Pain worsened yesterday and she went back to the ED where CT shows 3 mm distal right ureteral stone and creatinine was up to 3.7 from baseline of 1.5. No fever or hematuria. Pain is resolved this am    Past Medical History:   Past Medical History:   Diagnosis Date    Asthma     Diabetes mellitus ([facility] [address]     Hyperlipidemia     Hypertension        Past Surgical History:  History reviewed. No pertinent surgical history. Social History:  Social History     Socioeconomic History    Marital status: Single     Spouse name: Not on file    Number of children: Not on file    Years of education: Not on file    Highest education level: Not on file   Occupational History    Not on file   Tobacco Use    Smoking status: Current Every Day Smoker     Packs/day: 0.50     Types: Cigarettes    Smokeless tobacco: Never Used   Vaping Use    Vaping Use: Never used   Substance and Sexual Activity    Alcohol use: Yes     Comment: social    Drug use: Not Currently     Comment: occasional    Sexual activity: Not on file   Other Topics Concern    Not on file   Social History Narrative    Not on file     Social Determinants of Health     Financial Resource Strain:     Difficulty of Paying Living Expenses: Not on file   Food Insecurity:     Worried About Running Out of Food in the Last Year: Not on file    Brad of Food in the Last Year: Not on file   Transportation Needs:     Lack of Transportation (Medical): Not on file    Lack of Transportation (Non-Medical):  Not on file   Physical Activity:     Days of Exercise per Week: Not on file    Minutes of Exercise per Session: Not on file   Stress:     Feeling of Stress : Not on file Social Connections:     Frequency of Communication with Friends and Family: Not on file    Frequency of Social Gatherings with Friends and Family: Not on file    Attends Jew Services: Not on file    Active Member of Clubs or Organizations: Not on file    Attends Club or Organization Meetings: Not on file    Marital Status: Not on file   Intimate Partner Violence:     Fear of Current or Ex-Partner: Not on file    Emotionally Abused: Not on file    Physically Abused: Not on file    Sexually Abused: Not on file   Housing Stability:     Unable to Pay for Housing in the Last Year: Not on file    Number of [place] in the Last Year: Not on file    Unstable Housing in the Last Year: Not on file       Family History:  History reviewed. No pertinent family history.     Meds:   Current Facility-Administered Medications: 0.9 % sodium chloride infusion, , IntraVENous, Continuous  sodium chloride flush 0.9 % injection 5-40 mL, 5-40 mL, IntraVENous, 2 times per day  sodium chloride flush 0.9 % injection 5-40 mL, 5-40 mL, IntraVENous, PRN  0.9 % sodium chloride infusion, , IntraVENous, PRN  enoxaparin (LOVENOX) injection 30 mg, 30 mg, SubCUTAneous, Daily  ondansetron (ZOFRAN-ODT) disintegrating tablet 4 mg, 4 mg, Oral, Q8H PRN **OR** ondansetron (ZOFRAN) injection 4 mg, 4 mg, IntraVENous, Q6H PRN  polyethylene glycol (GLYCOLAX) packet 17 g, 17 g, Oral, Daily PRN  acetaminophen (TYLENOL) tablet 650 mg, 650 mg, Oral, Q6H PRN **OR** acetaminophen (TYLENOL) suppository 650 mg, 650 mg, Rectal, Q6H PRN  albuterol sulfate  (90 Base) MCG/ACT inhaler 1 puff, 1 puff, Inhalation, Q4H PRN  allopurinol (ZYLOPRIM) tablet 100 mg, 100 mg, Oral, Daily  [Held by provider] amLODIPine (NORVASC) tablet 10 mg, 10 mg, Oral, Daily  atorvastatin (LIPITOR) tablet 40 mg, 40 mg, Oral, Nightly  cyclobenzaprine (FLEXERIL) tablet 5 mg, 5 mg, Oral, Daily  levothyroxine (SYNTHROID) tablet 175 mcg, 175 mcg, Oral, QAM AC  metoprolol succinate (TOPROL XL) extended release tablet 50 mg, 50 mg, Oral, Daily  glucose (GLUTOSE) 40 % oral gel 15 g, 15 g, Oral, PRN  dextrose 50 % IV solution, 12.5 g, IntraVENous, PRN  glucagon (rDNA) injection 1 mg, 1 mg, IntraMUSCular, PRN  dextrose 5 % solution, 100 mL/hr, IntraVENous, PRN  insulin lispro (HUMALOG) injection vial 0-6 Units, 0-6 Units, SubCUTAneous, TID WC  insulin lispro (HUMALOG) injection vial 0-3 Units, 0-3 Units, SubCUTAneous, Nightly  budesonide-formoterol (SYMBICORT) 80-4.5 MCG/ACT inhaler 2 puff, 2 puff, Inhalation, BID  meropenem (MERREM) 500 mg IVPB (mini-bag), 500 mg, IntraVENous, Q8H  cloNIDine (CATAPRES) tablet 0.1 mg, 0.1 mg, Oral, BID    Vitals:  /76   Pulse 81   Temp 98.3 °F (36.8 °C) (Oral)   Resp 18   Ht 5' 7\" (1.702 m)   Wt (!) 302 lb 11.1 oz (137.3 kg)   LMP 03/09/2022   SpO2 90%   BMI 47.41 kg/m²     Intake/Output Summary (Last 24 hours) at 4/10/2022 0755  Last data filed at 4/10/2022 0515  Gross per 24 hour   Intake 1764 ml   Output --   Net 1764 ml       Review of Systems:  10 Systems were reviewed and negative except as in HPI      Physical Exam:  General Appearance: Obese, Alert and oriented, cooperative, no distress, appears stated age  Head: Normocephalic, without obvious abnormality, atraumatic  Back: no CVA tenderness  Lungs: respirations unlabored    Abdomen: Obese Soft, non-tender, non-distended, no masses  Skin: Skin color, texture, turgor normal, no rashes or lesions  Neurologic: no gross deficits  Female :    Bladder is non tender   No CVA tenderness      Pelvic Exam Not Indicated    Labs:  CBC   Lab Results   Component Value Date    WBC 13.3 04/10/2022    RBC 3.10 04/10/2022    HGB 10.5 04/10/2022    HCT 31.7 04/10/2022    .4 04/10/2022    MCH 34.0 04/10/2022    MCHC 33.2 04/10/2022    RDW 14.4 04/10/2022     04/10/2022    MPV 9.0 04/10/2022     BMP   Lab Results   Component Value Date     04/09/2022    K 3.9 04/09/2022    K 3.9 04/09/2022     04/09/2022    CO2 18 04/09/2022    BUN 42 04/09/2022    CREATININE 3.7 04/09/2022    GLUCOSE 119 04/09/2022    CALCIUM 8.8 04/09/2022       Urinalysis:   Lab Results   Component Value Date    COLORU Yellow 04/09/2022    GLUCOSEU Negative 04/09/2022    BLOODU MODERATE 04/09/2022    NITRU Negative 04/09/2022    LEUKOCYTESUR MODERATE 04/09/2022       Imaging: *Pertinent images and radiologist's report were reviewed independently  3 mm right UVJ stone    Impression/Plan:   Right ureteral calculus  MIN    Plan:   Pain is resolved this morning. I suspect she may have passed the stone. If creatinine is significantly improved, will hold off on stent insertion.   If renal function is not improving, plan cysto with right ureteral stent insertion this am    Araceli Cartwright MD 6/21/80750:58 AM

## 2022-04-10 NOTE — CONSULTS
Consulting Physician: Kevin Cervantes    Reason for Consult: right flank pain    History of Present Illness: Becca Randall is a 42 y.o. diabetic female who initially presented Thursday 4/7/22 with abdominal pain and frequency. She was diagnosed with a UTi and sent home on cipro. Pain worsened yesterday and she went back to the ED where CT shows 3 mm distal right ureteral stone and creatinine was up to 3.7 from baseline of 1.5. No fever or hematuria. Pain is resolved this am    Past Medical History:   Past Medical History:   Diagnosis Date    Asthma     Diabetes mellitus ([facility] [address]     Hyperlipidemia     Hypertension        Past Surgical History:  History reviewed. No pertinent surgical history. Social History:  Social History     Socioeconomic History    Marital status: Single     Spouse name: Not on file    Number of children: Not on file    Years of education: Not on file    Highest education level: Not on file   Occupational History    Not on file   Tobacco Use    Smoking status: Current Every Day Smoker     Packs/day: 0.50     Types: Cigarettes    Smokeless tobacco: Never Used   Vaping Use    Vaping Use: Never used   Substance and Sexual Activity    Alcohol use: Yes     Comment: social    Drug use: Not Currently     Comment: occasional    Sexual activity: Not on file   Other Topics Concern    Not on file   Social History Narrative    Not on file     Social Determinants of Health     Financial Resource Strain:     Difficulty of Paying Living Expenses: Not on file   Food Insecurity:     Worried About Running Out of Food in the Last Year: Not on file    Brad of Food in the Last Year: Not on file   Transportation Needs:     Lack of Transportation (Medical): Not on file    Lack of Transportation (Non-Medical):  Not on file   Physical Activity:     Days of Exercise per Week: Not on file    Minutes of Exercise per Session: Not on file   Stress:     Feeling of Stress : Not on file Social Connections:     Frequency of Communication with Friends and Family: Not on file    Frequency of Social Gatherings with Friends and Family: Not on file    Attends Anabaptism Services: Not on file    Active Member of Clubs or Organizations: Not on file    Attends Club or Organization Meetings: Not on file    Marital Status: Not on file   Intimate Partner Violence:     Fear of Current or Ex-Partner: Not on file    Emotionally Abused: Not on file    Physically Abused: Not on file    Sexually Abused: Not on file   Housing Stability:     Unable to Pay for Housing in the Last Year: Not on file    Number of [place] in the Last Year: Not on file    Unstable Housing in the Last Year: Not on file       Family History:  History reviewed. No pertinent family history.     Meds:   Current Facility-Administered Medications: 0.9 % sodium chloride infusion, , IntraVENous, Continuous  sodium chloride flush 0.9 % injection 5-40 mL, 5-40 mL, IntraVENous, 2 times per day  sodium chloride flush 0.9 % injection 5-40 mL, 5-40 mL, IntraVENous, PRN  0.9 % sodium chloride infusion, , IntraVENous, PRN  enoxaparin (LOVENOX) injection 30 mg, 30 mg, SubCUTAneous, Daily  ondansetron (ZOFRAN-ODT) disintegrating tablet 4 mg, 4 mg, Oral, Q8H PRN **OR** ondansetron (ZOFRAN) injection 4 mg, 4 mg, IntraVENous, Q6H PRN  polyethylene glycol (GLYCOLAX) packet 17 g, 17 g, Oral, Daily PRN  acetaminophen (TYLENOL) tablet 650 mg, 650 mg, Oral, Q6H PRN **OR** acetaminophen (TYLENOL) suppository 650 mg, 650 mg, Rectal, Q6H PRN  albuterol sulfate  (90 Base) MCG/ACT inhaler 1 puff, 1 puff, Inhalation, Q4H PRN  allopurinol (ZYLOPRIM) tablet 100 mg, 100 mg, Oral, Daily  [Held by provider] amLODIPine (NORVASC) tablet 10 mg, 10 mg, Oral, Daily  atorvastatin (LIPITOR) tablet 40 mg, 40 mg, Oral, Nightly  cyclobenzaprine (FLEXERIL) tablet 5 mg, 5 mg, Oral, Daily  levothyroxine (SYNTHROID) tablet 175 mcg, 175 mcg, Oral, QAM AC  metoprolol succinate (TOPROL XL) extended release tablet 50 mg, 50 mg, Oral, Daily  glucose (GLUTOSE) 40 % oral gel 15 g, 15 g, Oral, PRN  dextrose 50 % IV solution, 12.5 g, IntraVENous, PRN  glucagon (rDNA) injection 1 mg, 1 mg, IntraMUSCular, PRN  dextrose 5 % solution, 100 mL/hr, IntraVENous, PRN  insulin lispro (HUMALOG) injection vial 0-6 Units, 0-6 Units, SubCUTAneous, TID WC  insulin lispro (HUMALOG) injection vial 0-3 Units, 0-3 Units, SubCUTAneous, Nightly  budesonide-formoterol (SYMBICORT) 80-4.5 MCG/ACT inhaler 2 puff, 2 puff, Inhalation, BID  meropenem (MERREM) 500 mg IVPB (mini-bag), 500 mg, IntraVENous, Q8H  cloNIDine (CATAPRES) tablet 0.1 mg, 0.1 mg, Oral, BID    Vitals:  /76   Pulse 81   Temp 98.3 °F (36.8 °C) (Oral)   Resp 18   Ht 5' 7\" (1.702 m)   Wt (!) 302 lb 11.1 oz (137.3 kg)   LMP 03/09/2022   SpO2 90%   BMI 47.41 kg/m²     Intake/Output Summary (Last 24 hours) at 4/10/2022 0755  Last data filed at 4/10/2022 0515  Gross per 24 hour   Intake 1764 ml   Output --   Net 1764 ml       Review of Systems:  10 Systems were reviewed and negative except as in HPI      Physical Exam:  General Appearance: Obese, Alert and oriented, cooperative, no distress, appears stated age  Head: Normocephalic, without obvious abnormality, atraumatic  Back: no CVA tenderness  Lungs: respirations unlabored    Abdomen: Obese Soft, non-tender, non-distended, no masses  Skin: Skin color, texture, turgor normal, no rashes or lesions  Neurologic: no gross deficits  Female :    Bladder is non tender   No CVA tenderness      Pelvic Exam Not Indicated    Labs:  CBC   Lab Results   Component Value Date    WBC 13.3 04/10/2022    RBC 3.10 04/10/2022    HGB 10.5 04/10/2022    HCT 31.7 04/10/2022    .4 04/10/2022    MCH 34.0 04/10/2022    MCHC 33.2 04/10/2022    RDW 14.4 04/10/2022     04/10/2022    MPV 9.0 04/10/2022     BMP   Lab Results   Component Value Date     04/09/2022    K 3.9 04/09/2022    K 3.9 04/09/2022     04/09/2022    CO2 18 04/09/2022    BUN 42 04/09/2022    CREATININE 3.7 04/09/2022    GLUCOSE 119 04/09/2022    CALCIUM 8.8 04/09/2022       Urinalysis:   Lab Results   Component Value Date    COLORU Yellow 04/09/2022    GLUCOSEU Negative 04/09/2022    BLOODU MODERATE 04/09/2022    NITRU Negative 04/09/2022    LEUKOCYTESUR MODERATE 04/09/2022       Imaging: *Pertinent images and radiologist's report were reviewed independently  3 mm right UVJ stone    Impression/Plan:   Right ureteral calculus  MIN    Plan:   Pain is resolved this morning. I suspect she may have passed the stone. If creatinine is significantly improved, will hold off on stent insertion.   If renal function is not improving, plan cysto with right ureteral stent insertion this am    Rolf Angulo MD 8/16/32261:25 AM

## 2022-04-10 NOTE — CONSULTS
Interval History and plan:     Her dose of clonidine was decreased due to low blood pressure  Also amlodipine is on hold  Blood pressure is coming up  Seen by urology- has has stenting done today  Cr still high at 3.6- prior to stending  Cow low at 17  Plan:    Start on bicarbonate drip continue drip                     Assessment :     Acute Kidney Injury  MIN likely due to - MIN likely due to urinary tract infection, sepsis, hydronephrosis  Cr on consultation-3.9, was  Baseline Cr-not available-last creatinine was 1.6-EGFR of 35 mm/min on 8/25/2020  She may have CKD stage IIIb from diabetes and hypertension      UA-moderate blood, moderate LE, more than 300 protein-currently on IV antibiotics  Renal Imaging:-4/22-right UVJ calculus with moderate hydronephrosis  Echo: Normal 1/2008-no recent 1 in the system    Hypertension   BP: (125-216)/()  Pulse:  []   BP goal inpatient [id number] systolic inpatient  BP was as low as 791 systolic on arrival-medicine has to be adjusted      Renal stones  Has 5 mm calculus on the right UVJ  Will need work-up as an outpatient for the cause of nephrolithiasis    Proteinuria  Protein in UA more than 300  We will need protein creatinine ratio once urinary tract infection is controlled        Elevated lactic acid  Diabetes mellitus  Anemia    Urinary tract infection    [facility] would like to thank Ana Taylor MD   for opportunity to serve this patient      Please call with questions at-   24 Hrs Answering service [phone] or  7 am- 5 pm via Perfect serve or cell phone  Nidia Delarosa MD          CC/reason for consult :     MIN on chronic kidney disease     HPI :     Gretchen Clark is a 42 y.o. female presented to   the hospital on 4/9/2022 with pain abdomen, also associated with shortness of breath.   She is known to have CKD seen by us in the past.  She was in the emergency room several days ago and was diagnosed to have urinary tract infection and sent home on ciprofloxacin orally. That however did not help and her continued to have worsening pain abdomen 9 out of 10. No associated nausea and vomiting but poor p.o. intake. She came to the ED and work-up revealed urinary tract infection. She had imaging done which showed hydronephrosis. She is getting antibiotics. Urology consulted for further input. Her renal function has worsened because of which we are consulted      ROS:     Seen with- no family    positives in bold   Constitutional:  fever, chills, weakness, weight change, fatigue  Skin:  rash, pruritus, hair loss, bruising, dry skin, petechiae  Head, Face, Neck   headaches, swelling,  cervical adenopathy  Respiratory: shortness of breath, cough, or wheezing  Cardiovascular: chest pain, palpitations, dizzy, edema  Gastrointestinal: nausea, vomiting, diarrhea, constipation,belly pain    Yellow skin, blood in stool  Musculoskeletal:  back pain, muscle weakness, gait problems,       joint pain or swelling. Genitourinary:  dysuria, poor urine flow, flank pain, blood in urine  Neurologic:  vertigo, TIA'S, syncope, seizures, focal weakness  Psychosocial:  insomnia, anxiety, or depression. Additional positive findings:                    All other remaining systems are negative or unable to obtain        PMH/PSH/SH/Family History:     Past Medical History:   Diagnosis Date    Asthma     Diabetes mellitus ([facility] [address]     Hyperlipidemia     Hypertension        History reviewed. No pertinent surgical history. reports that she has been smoking cigarettes. She has been smoking about 0.50 packs per day. She has never used smokeless tobacco. She reports current alcohol use. She reports previous drug use.    family history is not on file.          Medication:     Current Facility-Administered Medications: sodium bicarbonate 70 mEq in dextrose 5 % and 0.45 % NaCl 1,000 mL infusion, , IntraVENous, Continuous  sodium chloride flush 0.9 % injection 5-40 mL, 5-40 mL, IntraVENous, 2 times per day  sodium chloride flush 0.9 % injection 5-40 mL, 5-40 mL, IntraVENous, PRN  0.9 % sodium chloride infusion, , IntraVENous, PRN  enoxaparin (LOVENOX) injection 30 mg, 30 mg, SubCUTAneous, Daily  ondansetron (ZOFRAN-ODT) disintegrating tablet 4 mg, 4 mg, Oral, Q8H PRN **OR** ondansetron (ZOFRAN) injection 4 mg, 4 mg, IntraVENous, Q6H PRN  polyethylene glycol (GLYCOLAX) packet 17 g, 17 g, Oral, Daily PRN  acetaminophen (TYLENOL) tablet 650 mg, 650 mg, Oral, Q6H PRN **OR** acetaminophen (TYLENOL) suppository 650 mg, 650 mg, Rectal, Q6H PRN  albuterol sulfate  (90 Base) MCG/ACT inhaler 1 puff, 1 puff, Inhalation, Q4H PRN  allopurinol (ZYLOPRIM) tablet 100 mg, 100 mg, Oral, Daily  [Held by provider] amLODIPine (NORVASC) tablet 10 mg, 10 mg, Oral, Daily  atorvastatin (LIPITOR) tablet 40 mg, 40 mg, Oral, Nightly  cyclobenzaprine (FLEXERIL) tablet 5 mg, 5 mg, Oral, Daily  levothyroxine (SYNTHROID) tablet 175 mcg, 175 mcg, Oral, QAM AC  metoprolol succinate (TOPROL XL) extended release tablet 50 mg, 50 mg, Oral, Daily  glucose (GLUTOSE) 40 % oral gel 15 g, 15 g, Oral, PRN  dextrose 50 % IV solution, 12.5 g, IntraVENous, PRN  glucagon (rDNA) injection 1 mg, 1 mg, IntraMUSCular, PRN  dextrose 5 % solution, 100 mL/hr, IntraVENous, PRN  insulin lispro (HUMALOG) injection vial 0-6 Units, 0-6 Units, SubCUTAneous, TID WC  insulin lispro (HUMALOG) injection vial 0-3 Units, 0-3 Units, SubCUTAneous, Nightly  budesonide-formoterol (SYMBICORT) 80-4.5 MCG/ACT inhaler 2 puff, 2 puff, Inhalation, BID  meropenem (MERREM) 500 mg IVPB (mini-bag), 500 mg, IntraVENous, Q8H  cloNIDine (CATAPRES) tablet 0.1 mg, 0.1 mg, Oral, BID       Vitals :     Vitals:    04/10/22 0949   BP: (!) 143/82   Pulse: 93   Resp: 18   Temp: 98.2 °F (36.8 °C)   SpO2: 100%       I & O :       Intake/Output Summary (Last 24 hours) at 4/10/2022 1029  Last data filed at 4/10/2022 0515  Gross per 24 hour   Intake 1764 ml Output --   Net 1764 ml        Physical Examination :     General appearance: Anxious- no, distressed- no, in good spirits-    Obese, very pleasant  HEENT: Lips- normal, teeth- ok , oral mucosa- moist  Neck : Mass- no, appears symmetrical, JVD- not visible  Respiratory: Respiratory effort-  normal, wheeze- no, crackles -   Cardiovascular:  Ausculation- No M/R/G, Edema none   Abdomen: visible mass- no, distention- no, scar- no, tenderness- no                            hepatosplenomegaly-  no  Musculoskeletal:  clubbing no,cyanosis- no , digital ischemia- no                           muscle strength- grossly normal , tone - grossly normal  Skin: rashes- no , ulcers- no, induration- no, tightening - no  Psychiatric:  Judgement and insight- normal           AAO X 3  Additional finding:      LABS:     Recent Labs     04/09/22  0905 04/10/22  0647   WBC 4.3 13.3*   HGB 11.3* 10.5*   HCT 33.4* 31.7*    231     Recent Labs     04/09/22  0905 04/09/22  1610 04/10/22  0647   * 136 139   K 3.9 3.9 3.8    102 105   CO2 18* 18* 17*   BUN 38* 42* 43*   CREATININE 3.8* 3.7* 3.6*   GLUCOSE 114* 119* 110*

## 2022-04-10 NOTE — PROGRESS NOTES
Hospitalist Progress Note      PCP: [id number] [place] [address]    Date of Admission: 4/9/2022        Subjective: Feels much better, up to chair, no abdominal pain or back pain, no fever or chills at this time. Medications:  Reviewed    Infusion Medications    sodium bicarbonate infusion      sodium chloride      dextrose       Scheduled Medications    sodium chloride flush  5-40 mL IntraVENous 2 times per day    enoxaparin  30 mg SubCUTAneous Daily    allopurinol  100 mg Oral Daily    [Held by provider] amLODIPine  10 mg Oral Daily    atorvastatin  40 mg Oral Nightly    cyclobenzaprine  5 mg Oral Daily    levothyroxine  175 mcg Oral QAM AC    metoprolol succinate  50 mg Oral Daily    insulin lispro  0-6 Units SubCUTAneous TID WC    insulin lispro  0-3 Units SubCUTAneous Nightly    budesonide-formoterol  2 puff Inhalation BID    meropenem  500 mg IntraVENous Q8H    cloNIDine  0.1 mg Oral BID     PRN Meds: sodium chloride flush, sodium chloride, ondansetron **OR** ondansetron, polyethylene glycol, acetaminophen **OR** acetaminophen, albuterol sulfate HFA, glucose, dextrose, glucagon (rDNA), dextrose      Intake/Output Summary (Last 24 hours) at 4/10/2022 1102  Last data filed at 4/10/2022 0515  Gross per 24 hour   Intake 1764 ml   Output --   Net 1764 ml       Physical Exam Performed:    BP (!) 143/82   Pulse 93   Temp 98.2 °F (36.8 °C) (Oral)   Resp 18   Ht 5' 7\" (1.702 m)   Wt (!) 302 lb 11.1 oz (137.3 kg)   LMP 03/09/2022   SpO2 100%   BMI 47.41 kg/m²     General appearance: No apparent distress  Neck: Supple  Respiratory:  Normal respiratory effort. Clear to auscultation, bilaterally without Rales/Wheezes/Rhonchi. Cardiovascular: Regular rate and rhythm with normal S1/S2 without murmurs, rubs or gallops. Abdomen: Soft, non-tender, non-distended  Musculoskeletal: No clubbing, cyanosis   Skin: Skin color, texture, turgor normal.  No rashes or lesions.   Neurologic:  No focal weakness Psychiatric: Alert and oriented  Capillary Refill: Brisk,3 seconds, normal   Peripheral Pulses: +2 palpable, equal bilaterally       Labs:   Recent Labs     04/09/22  0905 04/10/22  0647   WBC 4.3 13.3*   HGB 11.3* 10.5*   HCT 33.4* 31.7*    231     Recent Labs     04/09/22  0905 04/09/22  1610 04/10/22  0647   * 136 139   K 3.9 3.9 3.8    102 105   CO2 18* 18* 17*   BUN 38* 42* 43*   CREATININE 3.8* 3.7* 3.6*   CALCIUM 9.1 8.8 8.7     Recent Labs     04/09/22  0905 04/10/22  0647   AST 20 25   ALT 14 17   BILITOT 0.9 0.8   ALKPHOS 205* 156*     No results for input(s): INR in the last 72 hours. No results for input(s): [place] in the last 72 hours. Urinalysis:      Lab Results   Component Value Date    NITRU Negative 04/09/2022    WBCUA >900 04/09/2022    BACTERIA 2+ 04/09/2022    RBCUA see below 04/09/2022    BLOODU MODERATE 04/09/2022    SPECGRAV 1.020 04/09/2022    GLUCOSEU Negative 04/09/2022       Radiology:  CT ABDOMEN PELVIS WO CONTRAST Additional Contrast? None   Final Result   1. 3 mm right UVJ calculus with moderate hydronephrosis. Additional   nonobstructive lower pole 5 mm calculus on the right. 2. Hepatomegaly with mild heterogeneity of the hepatic parenchyma. 3. No acute bowel pathology.                  Assessment/Plan:    Active Hospital Problems    Diagnosis     UTI (urinary tract infection) [N39.0]     Obstructed, uropathy [N13.9]     Elevated lactic acid level [G16.41]     Complicated UTI (urinary tract infection) [N39.0]     CKD (chronic kidney disease) [N18.9]     MIN (acute kidney injury) ([facility] [N17.9]     Morbid (severe) obesity due to excess calories ([facility] [E66.01]     Type 2 diabetes mellitus (HCC) [E11.9]     Anemia [D64.9]      1.  UTI, complicated, with obstructive uropathy, did receive p.o. antibiotics in the last 3 days, and did have UTI in the past not in the last few months ago, on meropenem at this time will de-escalate based on urine culture ordered on admission, to note that patient had a urine culture from Thursday positive for E. coli,. Urology consulted from ED.   procalcitonin significantly elevated will repeat in a.m.  2.  Suspecting sepsis even though patient does not completely meet SIRS criteria she stated she had fever and chills at home, on presentation her respiratory rate at 24.  3.  Obstructive uropathy, urology consulted, status post cystoscopy and stent placement on the right side. 4.  Acute on chronic kidney injury likely aggravated by obstructive uropathy, urology consulted as above, consulted nephrology yesterday, no improvement in creatinine as for now  5. Elevated lactic acid, improved with IV fluid  6. Diabetes mellitus, sliding scale  7. Anemia, chronic, follow-up as outpatient  8. Morbid obesity, complicating current presentation and increasing morbidity, counseled for weight loss      ADDENDUM   Patient had a very short bradycardia down to 30s, completely asymptomatic, will keep monitoring, keep on metoprolol for now    Diet: ADULT DIET;  Regular; 4 carb choices (60 gm/meal)  Code Status: Full Code      Lori Laura MD

## 2022-04-10 NOTE — PROGRESS NOTES
Pt returned from PACU to room. VSS. Pt denies pain. Breathing unlabored, on 3L NC. Will continue to monitor.

## 2022-04-10 NOTE — BRIEF OP NOTE
Brief Postoperative Note      Patient: Ren Guadalupe  YOB: 1975  MRN: [id number]    Date of Procedure: 4/10/2022    Pre-Op Diagnosis: right ureteral calculus, MIN    Post-Op Diagnosis: Same       Procedure(s):  CYSTOSCOPY  RIGHT RETROGRADE PYELOGRAM STENT INSERTION    Surgeon(s):  Rolf Angulo MD    Assistant:  * No surgical staff found *    Anesthesia: General    Estimated Blood Loss (mL): Minimal    Complications: None    Specimens:   * No specimens in log *    Implants:  Implant Name Type Inv.  Item Serial No.  Lot No. LRB No. Used Action   STENT URET 6FR L24CM PERCFLX HYDR+ SFT PGTL TAPR TIP W/O - [id number]  STENT URET 6FR L24CM PERCFLX HYDR+ SFT PGTL TAPR TIP W/O  [employer]- [id number] Right 1 Implanted         Drains: * No LDAs found *    Findings: normal bladder, right stent placed    Electronically signed by Rolf Angulo MD on 4/10/2022 at 9:16 AM

## 2022-04-10 NOTE — PLAN OF CARE
Problem: Falls - Risk of:  Goal: Will remain free from falls  Description: Will remain free from falls  4/10/2022 1551 by Dimas Fernandez RN  Outcome: Ongoing     Problem: Falls - Risk of:  Goal: Absence of physical injury  Description: Absence of physical injury  4/10/2022 1551 by Dimas Fernandez RN  Outcome: Ongoing     Problem: Pain:  Goal: Pain level will decrease  Description: Pain level will decrease  4/10/2022 1551 by Dimas Fernandez RN  Outcome: Ongoing     Problem: Pain:  Goal: Control of acute pain  Description: Control of acute pain  Outcome: Ongoing     Problem: Pain:  Goal: Control of chronic pain  Description: Control of chronic pain  Outcome: Ongoing     Problem: Discharge Planning:  Goal: Discharged to appropriate level of care  Description: Discharged to appropriate level of care  Outcome: Ongoing     Problem: Gas Exchange - Impaired:  Goal: Levels of oxygenation will improve  Description: Levels of oxygenation will improve  Outcome: Ongoing     Problem: Infection, Septic Shock:  Goal: Will show no infection signs and symptoms  Description: Will show no infection signs and symptoms  4/10/2022 1551 by Dimas Fernandez RN  Outcome: Ongoing     Problem: Serum Glucose Level - Abnormal:  Goal: Ability to maintain appropriate glucose levels will improve  Description: Ability to maintain appropriate glucose levels will improve  Outcome: Ongoing     Problem: Tissue Perfusion, Altered:  Goal: Circulatory function within specified parameters  Description: Circulatory function within specified parameters  Outcome: Ongoing     Problem: Venous Thromboembolism:  Goal: Will show no signs or symptoms of venous thromboembolism  Description: Will show no signs or symptoms of venous thromboembolism  Outcome: Ongoing     Problem: Venous Thromboembolism:  Goal: Absence of signs or symptoms of impaired coagulation  Description: Absence of signs or symptoms of impaired coagulation  Outcome: Ongoing     Problem:  Activity:  Goal: Risk for activity intolerance will decrease  Description: Risk for activity intolerance will decrease  4/10/2022 1551 by El Gregory RN  Outcome: Ongoing     Problem: Bowel/Gastric:  Goal: Bowel function will improve  Description: Bowel function will improve  Outcome: Ongoing     Problem: Bowel/Gastric:  Goal: Diagnostic test results will improve  Description: Diagnostic test results will improve  Outcome: Ongoing     Problem: Bowel/Gastric:  Goal: Occurrences of nausea will decrease  Description: Occurrences of nausea will decrease  Outcome: Ongoing     Problem:  Bowel/Gastric:  Goal: Occurrences of vomiting will decrease  Description: Occurrences of vomiting will decrease  Outcome: Ongoing     Problem: Fluid Volume:  Goal: Maintenance of adequate hydration will improve  Description: Maintenance of adequate hydration will improve  4/10/2022 1551 by El Gregory RN  Outcome: Ongoing     Problem: Physical Regulation:  Goal: Complications related to the disease process, condition or treatment will be avoided or minimized  Description: Complications related to the disease process, condition or treatment will be avoided or minimized  Outcome: Ongoing     Problem: Physical Regulation:  Goal: Ability to maintain clinical measurements within normal limits will improve  Description: Ability to maintain clinical measurements within normal limits will improve  Outcome: Ongoing     Problem: Sensory:  Goal: Pain level will decrease  Description: Pain level will decrease  4/10/2022 1551 by El Gregory RN  Outcome: Ongoing     Problem: Sensory:  Goal: Ability to identify factors that increase the pain will improve  Description: Ability to identify factors that increase the pain will improve  Outcome: Ongoing     Problem: Sensory:  Goal: Ability to notify healthcare provider of pain before it becomes unmanageable or unbearable will improve  Description: Ability to notify healthcare provider of pain before it becomes unmanageable or unbearable will improve  Outcome: Ongoing

## 2022-04-10 NOTE — PROGRESS NOTES
Pt arrived to PACU from OR. Pt asleep on 3l/nc, awakens easily. Abd soft. Denies c/o pain at this time.

## 2022-04-10 NOTE — ANESTHESIA PRE PROCEDURE
[facility] Department of Anesthesiology  Pre-Anesthesia Evaluation/Consultation       Name:  Gary Bedoya  : 1975  Age:  42 y. o. MRN:  [id number]  Date: 4/10/2022           Surgeon: Rukhsana Martinez):  Thea Cordero MD    Procedure: Procedure(s):  CYSTOSCOPY RETROGRADE PYELOGRAM RIGHT STENT INSERTION     Allergies   Allergen Reactions    Penicillins Hives     Patient Active Problem List   Diagnosis    [facility] ([facility]    Headache    Hypokalemia    Hyponatremia    Morbid (severe) obesity due to excess calories (HCC)    Anemia    Type 2 diabetes mellitus (HCC)    Postoperative hypothyroidism    UTI (urinary tract infection)    Obstructed, uropathy    Elevated lactic acid level    Complicated UTI (urinary tract infection)    CKD (chronic kidney disease)     Past Medical History:   Diagnosis Date    Asthma     Diabetes mellitus ([facility]     Hyperlipidemia     Hypertension      History reviewed. No pertinent surgical history. Social History     Tobacco Use    Smoking status: Current Every Day Smoker     Packs/day: 0.50     Types: Cigarettes    Smokeless tobacco: Never Used   Vaping Use    Vaping Use: Never used   Substance Use Topics    Alcohol use: Yes     Comment: social    Drug use: Not Currently     Comment: occasional     Medications  No current facility-administered medications on file prior to encounter.      Current Outpatient Medications on File Prior to Encounter   Medication Sig Dispense Refill    allopurinol (ZYLOPRIM) 100 MG tablet Take 100 mg by mouth daily      vitamin D3 (CHOLECALCIFEROL) 125 MCG (5000 UT) TABS tablet Take 1 tablet by mouth daily      potassium chloride (KLOR-CON M) 20 MEQ extended release tablet Take 1 tablet by mouth daily      albuterol sulfate  (90 Base) MCG/ACT inhaler Inhale 1 puff into the lungs every 4-6 hours as needed SOB      atorvastatin (LIPITOR) 40 MG tablet Take 40 mg by mouth daily      cyclobenzaprine (FLEXERIL) 5 MG tablet Take 5 mg by mouth daily      K Ph[place]-Sod Phos Di & Mono (PHOSPHA 250 NEUTRAL) 155-852-130 MG TABS Take 1 tablet by mouth in the morning and at bedtime      metoprolol succinate (TOPROL XL) 50 MG extended release tablet Take 50 mg by mouth daily      pioglitazone (ACTOS) 15 MG tablet Take 15 mg by mouth daily      ciprofloxacin (CIPRO) 500 MG tablet Take 1 tablet by mouth 2 times daily for 10 days 20 tablet 0    ibuprofen (ADVIL;MOTRIN) 800 MG tablet Take 1 tablet by mouth every 8 hours as needed for Pain 30 tablet 0    folic acid-pyridoxine-cyanocobalamine (FOLTX) 2.5-25-1 MG TABS tablet Take 1 tablet by mouth daily 30 tablet 0    cloNIDine (CATAPRES) 0.2 MG tablet Take 0.2 mg by mouth 2 times daily      amLODIPine (NORVASC) 5 MG tablet Take 10 mg by mouth daily      levothyroxine (SYNTHROID) 175 MCG tablet Take 175 mcg by mouth daily      budesonide-formoterol (SYMBICORT) 160-4.5 MCG/ACT AERO Inhale 2 puffs into the lungs 2 times daily       Current Facility-Administered Medications   Medication Dose Route Frequency Provider Last Rate Last Admin    0.9 % sodium chloride infusion   IntraVENous Continuous Lucian Gay  mL/hr at 04/09/22 1613 New Bag at 04/09/22 1613    sodium chloride flush 0.9 % injection 5-40 mL  5-40 mL IntraVENous 2 times per day Lucian Gay MD        sodium chloride flush 0.9 % injection 5-40 mL  5-40 mL IntraVENous PRN Lucian Gay MD        0.9 % sodium chloride infusion   IntraVENous PRN Lucian Gay MD        enoxaparin (LOVENOX) injection 30 mg  30 mg SubCUTAneous Daily Lucian Gay MD        ondansetron (ZOFRAN-ODT) disintegrating tablet 4 mg  4 mg Oral Q8H PRN Lucian Gay MD        Or    ondansetron (ZOFRAN) injection 4 mg  4 mg IntraVENous Q6H PRN Lucian Gay MD        polyethylene glycol (GLYCOLAX) packet 17 g  17 g Oral Daily PRN Lucian Orr MD  acetaminophen (TYLENOL) tablet 650 mg  650 mg Oral Q6H PRN Lucian Rock MD   650 mg at 04/09/22 2053    Or    acetaminophen (TYLENOL) suppository 650 mg  650 mg Rectal Q6H PRN Lucian Gay MD        albuterol sulfate  (90 Base) MCG/ACT inhaler 1 puff  1 puff Inhalation Q4H PRN Lucian Gay MD        allopurinol (ZYLOPRIM) tablet 100 mg  100 mg Oral Daily Lucian Gay MD        [Held by provider] amLODIPine (NORVASC) tablet 10 mg  10 mg Oral Daily Lucian Gay MD        atorvastatin (LIPITOR) tablet 40 mg  40 mg Oral Nightly Lucian Gay MD   40 mg at 04/09/22 2053    cyclobenzaprine (FLEXERIL) tablet 5 mg  5 mg Oral Daily Lucian Gay MD        levothyroxine (SYNTHROID) tablet 175 mcg  175 mcg Oral QAM AC Lucian Gay MD        metoprolol succinate (TOPROL XL) extended release tablet 50 mg  50 mg Oral Daily Lucian Gay MD        glucose (GLUTOSE) 40 % oral gel 15 g  15 g Oral PRN Lucian Gay MD        dextrose 50 % IV solution  12.5 g IntraVENous PRN Lucian Gay MD        glucagon (rDNA) injection 1 mg  1 mg IntraMUSCular PRN Lucian Gay MD        dextrose 5 % solution  100 mL/hr IntraVENous PRN Lucian Gay MD        insulin lispro (HUMALOG) injection vial 0-6 Units  0-6 Units SubCUTAneous TID WC Lucian Gay MD        insulin lispro (HUMALOG) injection vial 0-3 Units  0-3 Units SubCUTAneous Nightly Lucian Gay MD        budesonide-formoterol (SYMBICORT) 80-4.5 MCG/ACT inhaler 2 puff  2 puff Inhalation BID Lucian Gay MD   2 puff at 04/09/22 2029    meropenem (MERREM) 500 mg IVPB (mini-bag)  500 mg IntraVENous Q8H Lucian Gay  mL/hr at 04/10/22 0827 500 mg at 04/10/22 0827    cloNIDine (CATAPRES) tablet 0.1 mg  0.1 mg Oral BID Donte Alcala MD   0.1 mg at 04/09/22 2053     Vital Signs (Current)   Vitals:    04/10/22 0449   BP: 125/76   Pulse: 81   Resp: 18   Temp: 98.3 °F (36.8 °C)   SpO2: 90%     Vital Signs Statistics (for past 48 hrs)     Temp  Av.7 °F (37.1 °C)  Min: 97.9 °F (36.6 °C)   Min taken time: 04/10/22 005  Max: 99.5 °F (37.5 °C)   Max taken time: 22 1500  Pulse  Av.9  Min: 76   Min taken time: 04/10/22 0055  Max: 80   Max taken time: 22 1345  Resp  Av.6  Min: 12   Min taken time: 22 1930  Max: 24   Max taken time: 22 0945  BP  Min: 104/60   Min taken time: 22  Max: 125/76   Max taken time: 04/10/22 0449  MAP (mmHg)  Av.8  Min: 76   Min taken time: 22 0915  Max: 95   Max taken time: 04/10/22 0055  SpO2  Av.6 %  Min: 90 %   Min taken time: 04/10/22 0449  Max: 100 %   Max taken time: 04/10/22 0055    BP Readings from Last 3 Encounters:   04/10/22 125/76   22 (!) 162/86   20 138/87     BMI  Body mass index is 47.41 kg/m². Estimated body mass index is 47.41 kg/m² as calculated from the following:    Height as of this encounter: 5' 7\" (1.702 m). Weight as of this encounter: 302 lb 11.1 oz (137.3 kg).     CBC   Lab Results   Component Value Date    WBC 13.3 04/10/2022    RBC 3.10 04/10/2022    HGB 10.5 04/10/2022    HCT 31.7 04/10/2022    .4 04/10/2022    RDW 14.4 04/10/2022     04/10/2022     CMP    Lab Results   Component Value Date     04/10/2022    K 3.8 04/10/2022     04/10/2022    CO2 17 04/10/2022    BUN 43 04/10/2022    CREATININE 3.6 04/10/2022    GFRAA 17 04/10/2022    AGRATIO 0.7 04/10/2022    LABGLOM 14 04/10/2022    GLUCOSE 110 04/10/2022    PROT 5.9 04/10/2022    CALCIUM 8.7 04/10/2022    BILITOT 0.8 04/10/2022    ALKPHOS 156 04/10/2022    AST 25 04/10/2022    ALT 17 04/10/2022     BMP    Lab Results   Component Value Date     04/10/2022    K 3.8 04/10/2022     04/10/2022    CO2 17 04/10/2022    BUN 43 04/10/2022    CREATININE 3.6 04/10/2022    CALCIUM 8.7 04/10/2022    GFRAA 17 04/10/2022    LABGLOM 14 04/10/2022    GLUCOSE 110 04/10/2022 POCGlucose  Recent Labs     04/09/22  0905 04/09/22  1610 04/10/22  0647   GLUCOSE 114* 119* 110*      Coags  No results found for: PROTIME, INR, APTT  HCG (If Applicable)   Lab Results   Component Value Date    PREGTESTUR Negative 04/07/2022      ABGs No results found for: PHART, PO2ART, XHC1JBL, PPU9MSA, BEART, I2UNAVQP   Type & Screen (If Applicable)  No results found for: LABABO, LABRH                         BMI: Wt Readings from Last 3 Encounters:       NPO Status:8 hours                          Anesthesia Evaluation  Patient summary reviewed no history of anesthetic complications:   Airway: Mallampati: III  TM distance: >3 FB   Neck ROM: full   Dental: normal exam         Pulmonary:normal exam    (+) asthma:                            Cardiovascular:  Exercise tolerance: good (>4 METS),   (+) hypertension:,       ECG reviewed  Rhythm: regular  Rate: normal           Beta Blocker:  Dose within 24 Hrs         Neuro/Psych:   (+) headaches:,             GI/Hepatic/Renal:   (+) renal disease: CRI, morbid obesity     (-) GERD       Endo/Other:    (+) DiabetesType II DM, , hypothyroidism::., .                 Abdominal:   (+) obese,           Vascular: negative vascular ROS. Other Findings:           Anesthesia Plan      general     ASA 3       Induction: intravenous. MIPS: Postoperative opioids intended and Prophylactic antiemetics administered. Anesthetic plan and risks discussed with patient. This pre-anesthesia assessment may be used as a history and physical.    DOS STAFF ADDENDUM:    Pt seen and examined, chart reviewed (including anesthesia, drug and allergy history). No interval changes to history and physical examination. Anesthetic plan, risks, benefits, alternatives, and personnel involved discussed with patient. Questions and concerns addressed. Patient(family) verbalized an understanding and agrees to proceed.       Terrance Mitchell MD  April 10, 2022  8:27 AM

## 2022-04-10 NOTE — PROGRESS NOTES
RT at bedside to administer duoneb treatment. Report called to Ratna Meier RN on 4W. VSS. Ok to transfer back to room 4126.

## 2022-04-10 NOTE — PLAN OF CARE
Problem: Falls - Risk of:  Goal: Will remain free from falls  Description: Will remain free from falls  4/10/2022 0412 by Cristiane Collier RN  Outcome: Met This Shift  4/9/2022 1639 by Vik Luque RN  Outcome: Ongoing  4/9/2022 1534 by Vik Luque RN  Outcome: Ongoing  Goal: Absence of physical injury  Description: Absence of physical injury  4/10/2022 0412 by Cristiane Collier RN  Outcome: Met This Shift  4/9/2022 1639 by Vik Luque RN  Outcome: Ongoing  4/9/2022 1534 by Vik Luque RN  Outcome: Ongoing     Problem: Pain:  Goal: Pain level will decrease  Description: Pain level will decrease  Outcome: Met This Shift     Problem: Infection, Septic Shock:  Goal: Will show no infection signs and symptoms  Description: Will show no infection signs and symptoms  Outcome: Met This Shift     Problem:  Activity:  Goal: Risk for activity intolerance will decrease  Description: Risk for activity intolerance will decrease  Outcome: Met This Shift     Problem: Fluid Volume:  Goal: Maintenance of adequate hydration will improve  Description: Maintenance of adequate hydration will improve  Outcome: Met This Shift     Problem: Sensory:  Goal: Pain level will decrease  Description: Pain level will decrease  Outcome: Met This Shift

## 2022-04-10 NOTE — ANESTHESIA POSTPROCEDURE EVALUATION
[facility] Department of Anesthesiology  Post-Anesthesia Note       Name:  Severiano Ards                                  Age:  42 y.o. MRN:  [id number]     Last Vitals & Oxygen Saturation: BP (!) 143/82   Pulse 93   Temp 98.2 °F (36.8 °C) (Oral)   Resp 18   Ht 5' 7\" (1.702 m)   Wt (!) 302 lb 11.1 oz (137.3 kg)   LMP 03/09/2022   SpO2 100%   BMI 47.41 kg/m²   Patient Vitals for the past 4 hrs:   BP Temp Temp src Pulse Resp SpO2   04/10/22 0949 (!) 143/82 98.2 °F (36.8 °C) Oral 93 18 100 %   04/10/22 0939    99 30 93 %   04/10/22 0938     26 94 %   04/10/22 0937     16 96 %   04/10/22 0932    95 13 97 %   04/10/22 0930 126/79   96 18 96 %   04/10/22 0924 128/85   101 25 94 %   04/10/22 0920 134/74   106 21 92 %   04/10/22 0917 136/73 97.3 °F (36.3 °C) Temporal 107 16 92 %       Level of consciousness:  Awake, alert to baseline    Respiratory: Respirations easy, no distress. Stable. To floor on NC    Cardiovascular: Hemodynamically stable. Hydration: Adequate. PONV: Adequately managed. Post-op pain: Adequately controlled. Post-op assessment: Tolerated anesthetic well without complication. Complications:  None.     Berta Kennedy MD  April 10, 2022   10:32 AM

## 2022-04-11 LAB
A/G RATIO: 0.6 (ref 1.1–2.2)
ALBUMIN SERPL-MCNC: 2.8 G/DL (ref 3.4–5)
ALP BLD-CCNC: 176 U/L (ref 40–129)
ALT SERPL-CCNC: 21 U/L (ref 10–40)
ANION GAP SERPL CALCULATED.3IONS-SCNC: 18 MMOL/L (ref 3–16)
AST SERPL-CCNC: 22 U/L (ref 15–37)
BASOPHILS ABSOLUTE: 0 K/UL (ref 0–0.2)
BASOPHILS RELATIVE PERCENT: 0.1 %
BILIRUB SERPL-MCNC: 0.4 MG/DL (ref 0–1)
BUN BLDV-MCNC: 38 MG/DL (ref 7–20)
CALCIUM SERPL-MCNC: 9.7 MG/DL (ref 8.3–10.6)
CHLORIDE BLD-SCNC: 103 MMOL/L (ref 99–110)
CO2: 17 MMOL/L (ref 21–32)
CREAT SERPL-MCNC: 2.6 MG/DL (ref 0.6–1.1)
EOSINOPHILS ABSOLUTE: 0 K/UL (ref 0–0.6)
EOSINOPHILS RELATIVE PERCENT: 0.2 %
GFR AFRICAN AMERICAN: 24
GFR NON-AFRICAN AMERICAN: 20
GLUCOSE BLD-MCNC: 131 MG/DL (ref 70–99)
GLUCOSE BLD-MCNC: 136 MG/DL (ref 70–99)
GLUCOSE BLD-MCNC: 167 MG/DL (ref 70–99)
GLUCOSE BLD-MCNC: 192 MG/DL (ref 70–99)
GLUCOSE BLD-MCNC: 202 MG/DL (ref 70–99)
HCT VFR BLD CALC: 36 % (ref 36–48)
HEMOGLOBIN: 12.3 G/DL (ref 12–16)
LYMPHOCYTES ABSOLUTE: 0.9 K/UL (ref 1–5.1)
LYMPHOCYTES RELATIVE PERCENT: 5.1 %
MCH RBC QN AUTO: 34.6 PG (ref 26–34)
MCHC RBC AUTO-ENTMCNC: 34 G/DL (ref 31–36)
MCV RBC AUTO: 101.5 FL (ref 80–100)
MONOCYTES ABSOLUTE: 0.9 K/UL (ref 0–1.3)
MONOCYTES RELATIVE PERCENT: 5.1 %
NEUTROPHILS ABSOLUTE: 16.2 K/UL (ref 1.7–7.7)
NEUTROPHILS RELATIVE PERCENT: 89.5 %
ORGANISM: ABNORMAL
PDW BLD-RTO: 14.3 % (ref 12.4–15.4)
PERFORMED ON: ABNORMAL
PLATELET # BLD: 265 K/UL (ref 135–450)
PMV BLD AUTO: 9.4 FL (ref 5–10.5)
POTASSIUM SERPL-SCNC: 4.2 MMOL/L (ref 3.5–5.1)
PROCALCITONIN: 42.55 NG/ML (ref 0–0.15)
RBC # BLD: 3.55 M/UL (ref 4–5.2)
SODIUM BLD-SCNC: 138 MMOL/L (ref 136–145)
TOTAL PROTEIN: 7.8 G/DL (ref 6.4–8.2)
URINE CULTURE, ROUTINE: ABNORMAL
URINE CULTURE, ROUTINE: ABNORMAL
WBC # BLD: 18.1 K/UL (ref 4–11)

## 2022-04-11 PROCEDURE — 6370000000 HC RX 637 (ALT 250 FOR IP): Performed by: UROLOGY

## 2022-04-11 PROCEDURE — 84145 PROCALCITONIN (PCT): CPT

## 2022-04-11 PROCEDURE — 36415 COLL VENOUS BLD VENIPUNCTURE: CPT

## 2022-04-11 PROCEDURE — 94640 AIRWAY INHALATION TREATMENT: CPT

## 2022-04-11 PROCEDURE — 6370000000 HC RX 637 (ALT 250 FOR IP): Performed by: INTERNAL MEDICINE

## 2022-04-11 PROCEDURE — 80053 COMPREHEN METABOLIC PANEL: CPT

## 2022-04-11 PROCEDURE — 6360000002 HC RX W HCPCS: Performed by: INTERNAL MEDICINE

## 2022-04-11 PROCEDURE — 6360000002 HC RX W HCPCS: Performed by: UROLOGY

## 2022-04-11 PROCEDURE — 2580000003 HC RX 258: Performed by: UROLOGY

## 2022-04-11 PROCEDURE — 94760 N-INVAS EAR/PLS OXIMETRY 1: CPT

## 2022-04-11 PROCEDURE — 85025 COMPLETE CBC W/AUTO DIFF WBC: CPT

## 2022-04-11 PROCEDURE — 2500000003 HC RX 250 WO HCPCS: Performed by: UROLOGY

## 2022-04-11 PROCEDURE — 1200000000 HC SEMI PRIVATE

## 2022-04-11 RX ORDER — METOPROLOL SUCCINATE 25 MG/1
25 TABLET, EXTENDED RELEASE ORAL ONCE
Status: COMPLETED | OUTPATIENT
Start: 2022-04-11 | End: 2022-04-11

## 2022-04-11 RX ORDER — SODIUM BICARBONATE 650 MG/1
650 TABLET ORAL 3 TIMES DAILY
Status: DISCONTINUED | OUTPATIENT
Start: 2022-04-11 | End: 2022-04-13 | Stop reason: HOSPADM

## 2022-04-11 RX ORDER — HYDRALAZINE HYDROCHLORIDE 20 MG/ML
10 INJECTION INTRAMUSCULAR; INTRAVENOUS ONCE
Status: COMPLETED | OUTPATIENT
Start: 2022-04-11 | End: 2022-04-11

## 2022-04-11 RX ADMIN — ENOXAPARIN SODIUM 30 MG: 100 INJECTION SUBCUTANEOUS at 08:18

## 2022-04-11 RX ADMIN — LEVOTHYROXINE SODIUM 175 MCG: 0.12 TABLET ORAL at 06:17

## 2022-04-11 RX ADMIN — Medication 2 PUFF: at 08:29

## 2022-04-11 RX ADMIN — MEROPENEM 500 MG: 500 INJECTION, POWDER, FOR SOLUTION INTRAVENOUS at 00:13

## 2022-04-11 RX ADMIN — CLONIDINE HYDROCHLORIDE 0.1 MG: 0.1 TABLET ORAL at 20:24

## 2022-04-11 RX ADMIN — ACETAMINOPHEN 650 MG: 325 TABLET ORAL at 20:24

## 2022-04-11 RX ADMIN — ATORVASTATIN CALCIUM 40 MG: 40 TABLET, FILM COATED ORAL at 20:24

## 2022-04-11 RX ADMIN — SODIUM BICARBONATE 650 MG: 650 TABLET ORAL at 20:24

## 2022-04-11 RX ADMIN — INSULIN LISPRO 2 UNITS: 100 INJECTION, SOLUTION INTRAVENOUS; SUBCUTANEOUS at 08:15

## 2022-04-11 RX ADMIN — AMLODIPINE BESYLATE 10 MG: 10 TABLET ORAL at 08:18

## 2022-04-11 RX ADMIN — MEROPENEM 500 MG: 500 INJECTION, POWDER, FOR SOLUTION INTRAVENOUS at 16:53

## 2022-04-11 RX ADMIN — CLONIDINE HYDROCHLORIDE 0.1 MG: 0.1 TABLET ORAL at 08:18

## 2022-04-11 RX ADMIN — HYDRALAZINE HYDROCHLORIDE 10 MG: 20 INJECTION INTRAMUSCULAR; INTRAVENOUS at 15:00

## 2022-04-11 RX ADMIN — METOPROLOL SUCCINATE 25 MG: 25 TABLET, FILM COATED, EXTENDED RELEASE ORAL at 16:54

## 2022-04-11 RX ADMIN — SODIUM CHLORIDE, PRESERVATIVE FREE 10 ML: 5 INJECTION INTRAVENOUS at 20:25

## 2022-04-11 RX ADMIN — SODIUM BICARBONATE 650 MG: 650 TABLET ORAL at 15:00

## 2022-04-11 RX ADMIN — METOPROLOL SUCCINATE 50 MG: 50 TABLET, EXTENDED RELEASE ORAL at 08:18

## 2022-04-11 RX ADMIN — Medication 2 PUFF: at 20:32

## 2022-04-11 RX ADMIN — CYCLOBENZAPRINE 5 MG: 10 TABLET, FILM COATED ORAL at 08:18

## 2022-04-11 RX ADMIN — ALLOPURINOL 100 MG: 100 TABLET ORAL at 08:18

## 2022-04-11 RX ADMIN — SODIUM BICARBONATE: 84 INJECTION, SOLUTION INTRAVENOUS at 03:20

## 2022-04-11 RX ADMIN — INSULIN LISPRO 1 UNITS: 100 INJECTION, SOLUTION INTRAVENOUS; SUBCUTANEOUS at 12:45

## 2022-04-11 RX ADMIN — MEROPENEM 500 MG: 500 INJECTION, POWDER, FOR SOLUTION INTRAVENOUS at 08:16

## 2022-04-11 RX ADMIN — Medication 1 TABLET: at 15:01

## 2022-04-11 RX ADMIN — SODIUM BICARBONATE 650 MG: 650 TABLET ORAL at 10:03

## 2022-04-11 RX ADMIN — ACETAMINOPHEN 650 MG: 325 TABLET ORAL at 08:25

## 2022-04-11 ASSESSMENT — PAIN DESCRIPTION - ONSET
ONSET: ON-GOING
ONSET: SUDDEN

## 2022-04-11 ASSESSMENT — PAIN DESCRIPTION - DESCRIPTORS
DESCRIPTORS: ACHING;DISCOMFORT;DULL;HEADACHE
DESCRIPTORS: HEADACHE

## 2022-04-11 ASSESSMENT — PAIN DESCRIPTION - DIRECTION: RADIATING_TOWARDS: MID

## 2022-04-11 ASSESSMENT — PAIN DESCRIPTION - PROGRESSION
CLINICAL_PROGRESSION: GRADUALLY IMPROVING
CLINICAL_PROGRESSION: GRADUALLY IMPROVING

## 2022-04-11 ASSESSMENT — PAIN DESCRIPTION - LOCATION
LOCATION: HEAD
LOCATION: HEAD

## 2022-04-11 ASSESSMENT — PAIN SCALES - GENERAL
PAINLEVEL_OUTOF10: 4
PAINLEVEL_OUTOF10: 0
PAINLEVEL_OUTOF10: 2
PAINLEVEL_OUTOF10: 0
PAINLEVEL_OUTOF10: 1
PAINLEVEL_OUTOF10: 0
PAINLEVEL_OUTOF10: 3

## 2022-04-11 ASSESSMENT — PAIN - FUNCTIONAL ASSESSMENT
PAIN_FUNCTIONAL_ASSESSMENT: ACTIVITIES ARE NOT PREVENTED
PAIN_FUNCTIONAL_ASSESSMENT: ACTIVITIES ARE NOT PREVENTED

## 2022-04-11 ASSESSMENT — PAIN DESCRIPTION - FREQUENCY
FREQUENCY: INTERMITTENT
FREQUENCY: INTERMITTENT

## 2022-04-11 ASSESSMENT — PAIN DESCRIPTION - PAIN TYPE
TYPE: ACUTE PAIN
TYPE: ACUTE PAIN

## 2022-04-11 ASSESSMENT — PAIN DESCRIPTION - ORIENTATION: ORIENTATION: MID

## 2022-04-11 NOTE — CARE COORDINATION
INITIAL CASE MANAGEMENT ASSESSMENT    Reviewed chart, met with patient to assess possible discharge needs. Explained Case Management role/services. Living Situation: Patient lives with her Mother and child in a house with 11 steps to enter. ADLs: Independent     DME: None    PT/OT Recs: None     Active Services: None     Transportation: Active /Family will transport     Medications: [place]/No barriers    PCP: [facility]      HD/PD: N/A    PLAN/COMMENTS: Patient plans to return to home with her family. Denies needs. SW/CM provided contact information for patient or family to call with any questions. SW/CM will follow and assist as needed.   Electronically signed by Papito Dubose RN on 4/11/2022 at 2:33 PM

## 2022-04-11 NOTE — PROGRESS NOTES
Pt Name: Real Luke Record Number: [id number]  Date of Birth 1975   Today's Date: 4/11/2022      Subjective:  Feeling better. Pain improved. Creatinine improving. ROS: Constitutional: No fever    Vitals:  Vitals:    04/11/22 1100 04/11/22 1342 04/11/22 1400 04/11/22 1530   BP: (!) 158/98 (!) 198/110 (!) 196/116 (!) 168/100   Pulse: 78 84 87    Resp:  16     Temp:  98.1 °F (36.7 °C)     TempSrc:  Oral     SpO2:  100%     Weight:       Height:         I/O last 3 completed shifts:   In: 1440 [P.O.:1440]  Out: 1000 [Urine:1000]    Exam:  General: Awake, oriented, no acute distress  Respiratory: Nonlabored breathing  Abdomen: Soft, non-tender, non-distended, no masses  : nonpalpable bladder  Skin: Skin color, texture, turgor normal, no rashes or lesions  Neurologic: no gross deficits    CURRENT MEDICATIONS   Scheduled Meds:   sodium bicarbonate  650 mg Oral TID    folic acid-pyridoxine-cyanocobalamine  1 tablet Oral Daily    [START ON 4/12/2022] metoprolol succinate  75 mg Oral Daily    sodium chloride flush  5-40 mL IntraVENous 2 times per day    enoxaparin  30 mg SubCUTAneous Daily    allopurinol  100 mg Oral Daily    amLODIPine  10 mg Oral Daily    atorvastatin  40 mg Oral Nightly    cyclobenzaprine  5 mg Oral Daily    levothyroxine  175 mcg Oral QAM AC    insulin lispro  0-6 Units SubCUTAneous TID WC    insulin lispro  0-3 Units SubCUTAneous Nightly    budesonide-formoterol  2 puff Inhalation BID    meropenem  500 mg IntraVENous Q8H    cloNIDine  0.1 mg Oral BID     Continuous Infusions:   sodium chloride Stopped (04/11/22 0253)    dextrose       PRN Meds:.sodium chloride flush, sodium chloride, ondansetron **OR** ondansetron, polyethylene glycol, acetaminophen **OR** acetaminophen, albuterol sulfate HFA, glucose, dextrose, glucagon (rDNA), dextrose    LABS     Recent Labs     04/09/22  0905 04/09/22  0905 04/09/22  1610 04/10/22  0647 04/11/22  0717   WBC 4.3  --   --  13.3* 18.1*   HGB 11.3*  --   --  10.5* 12.3   HCT 33.4*  --   --  31.7* 36.0     --   --  231 265   *   < > 136 139 138   K 3.9  --  3.9 3.8 4.2      < > 102 105 103   CO2 18*   < > 18* 17* 17*   BUN 38*   < > 42* 43* 38*   CREATININE 3.8*   < > 3.7* 3.6* 2.6*   CALCIUM 9.1   < > 8.8 8.7 9.7   AST 20  --   --  25 22   ALT 14  --   --  17 21   BILITOT 0.9  --   --  0.8 0.4    < > = values in this interval not displayed. Escherichia coli Abnormal     Urine Culture, Routine 75,000 CFU/ml    Pan-sensitive        ASSESSMENT   1. Hospital day # 2  2. 43 yo female s/p cysto right ureteral stent placement for 3mm right ureteral stone with hydro and MIN  PLAN   1. Will need outpatient right ureteroscopy in 1-2 weeks  2.  No further acute  issues    Liban Miner MD 4/11/2022 7:12 PM

## 2022-04-11 NOTE — PLAN OF CARE
Problem: Falls - Risk of:  Goal: Will remain free from falls  Description: Will remain free from falls  4/11/2022 1036 by Roxanne Gowers, RN  Outcome: Ongoing     Problem: Falls - Risk of:  Goal: Absence of physical injury  Description: Absence of physical injury  4/11/2022 1036 by Roxanne Gowers, RN  Outcome: Ongoing     Problem: Pain:  Goal: Pain level will decrease  Description: Pain level will decrease  4/11/2022 1036 by Roxanne Gowers, RN  Outcome: Ongoing     Problem: Pain:  Goal: Control of acute pain  Description: Control of acute pain  4/11/2022 1036 by Roxanne Gowers, RN  Outcome: Ongoing     Problem: Pain:  Goal: Control of chronic pain  Description: Control of chronic pain  4/11/2022 1036 by Roxanne Gowers, RN  Outcome: Ongoing     Problem: Discharge Planning:  Goal: Discharged to appropriate level of care  Description: Discharged to appropriate level of care  4/11/2022 1036 by Roxanne Gowers, RN  Outcome: Ongoing     Problem: Gas Exchange - Impaired:  Goal: Levels of oxygenation will improve  Description: Levels of oxygenation will improve  4/11/2022 1036 by Roxanne Gowers, RN  Outcome: Ongoing     Problem: Infection, Septic Shock:  Goal: Will show no infection signs and symptoms  Description: Will show no infection signs and symptoms  4/11/2022 1036 by Roxanne Gowers, RN  Outcome: Ongoing     Problem: Infection - Ventilator-Associated Pneumonia:  Goal: Absence of pulmonary infection  Description: Absence of pulmonary infection  4/11/2022 1036 by Roxanne Gowers, RN  Outcome: Ongoing     Problem: Serum Glucose Level - Abnormal:  Goal: Ability to maintain appropriate glucose levels will improve  Description: Ability to maintain appropriate glucose levels will improve  4/11/2022 1036 by Roxanne Gowers, RN  Outcome: Ongoing     Problem: Tissue Perfusion, Altered:  Goal: Circulatory function within specified parameters  Description: Circulatory function within specified parameters  4/11/2022 1036 by Roxanne Gowers, RN  Outcome: Ongoing Problem: Venous Thromboembolism:  Goal: Will show no signs or symptoms of venous thromboembolism  Description: Will show no signs or symptoms of venous thromboembolism  4/11/2022 1036 by Roxanne Gowers, RN  Outcome: Ongoing     Problem: Venous Thromboembolism:  Goal: Absence of signs or symptoms of impaired coagulation  Description: Absence of signs or symptoms of impaired coagulation  4/11/2022 1036 by Roxanne Gowers, RN  Outcome: Ongoing     Problem: Activity:  Goal: Risk for activity intolerance will decrease  Description: Risk for activity intolerance will decrease  4/11/2022 1036 by Roxanne Gowers, RN  Outcome: Ongoing     Problem: Bowel/Gastric:  Goal: Bowel function will improve  Description: Bowel function will improve  4/11/2022 1036 by Roxanne Gowers, RN  Outcome: Ongoing     Problem: Bowel/Gastric:  Goal: Diagnostic test results will improve  Description: Diagnostic test results will improve  4/11/2022 1036 by Roxanne Gowers, RN  Outcome: Ongoing     Problem: Bowel/Gastric:  Goal: Occurrences of nausea will decrease  Description: Occurrences of nausea will decrease  4/11/2022 1036 by Roxanne Gowers, RN  Outcome: Ongoing     Problem:  Bowel/Gastric:  Goal: Occurrences of vomiting will decrease  Description: Occurrences of vomiting will decrease  4/11/2022 1036 by Roxanne Gowers, RN  Outcome: Ongoing     Problem: Fluid Volume:  Goal: Maintenance of adequate hydration will improve  Description: Maintenance of adequate hydration will improve  4/11/2022 1036 by Roxanne Gowers, RN  Outcome: Ongoing     Problem: Health Behavior:  Goal: Ability to state signs and symptoms to report to health care provider will improve  Description: Ability to state signs and symptoms to report to health care provider will improve  4/11/2022 1036 by Roxanne Gowers, RN  Outcome: Ongoing     Problem: Physical Regulation:  Goal: Complications related to the disease process, condition or treatment will be avoided or minimized  Description: Complications related to the disease process, condition or treatment will be avoided or minimized  4/11/2022 1036 by Beata Yusuf RN  Outcome: Ongoing     Problem: Physical Regulation:  Goal: Ability to maintain clinical measurements within normal limits will improve  Description: Ability to maintain clinical measurements within normal limits will improve  4/11/2022 1036 by Beata Yusuf RN  Outcome: Ongoing     Problem: Sensory:  Goal: Pain level will decrease  Description: Pain level will decrease  4/11/2022 1036 by Beata Yusuf RN  Outcome: Ongoing     Problem: Sensory:  Goal: Ability to identify factors that increase the pain will improve  Description: Ability to identify factors that increase the pain will improve  4/11/2022 1036 by Beata Yusuf RN  Outcome: Ongoing     Problem: Sensory:  Goal: Ability to notify healthcare provider of pain before it becomes unmanageable or unbearable will improve  Description: Ability to notify healthcare provider of pain before it becomes unmanageable or unbearable will improve  4/11/2022 1036 by Beata Yusuf RN  Outcome: Ongoing

## 2022-04-11 NOTE — PROGRESS NOTES
Hospitalist Progress Note      PCP: [id number] [place] [address]    Date of Admission: 4/9/2022      Subjective: up to chair, feels better, no abdominal pain, no fever or chills, no flank pain. Medications:  Reviewed    Infusion Medications    sodium bicarbonate infusion 70 mL/hr at 04/11/22 0320    sodium chloride Stopped (04/11/22 0253)    dextrose       Scheduled Medications    sodium chloride flush  5-40 mL IntraVENous 2 times per day    enoxaparin  30 mg SubCUTAneous Daily    allopurinol  100 mg Oral Daily    [Held by provider] amLODIPine  10 mg Oral Daily    atorvastatin  40 mg Oral Nightly    cyclobenzaprine  5 mg Oral Daily    levothyroxine  175 mcg Oral QAM AC    metoprolol succinate  50 mg Oral Daily    insulin lispro  0-6 Units SubCUTAneous TID WC    insulin lispro  0-3 Units SubCUTAneous Nightly    budesonide-formoterol  2 puff Inhalation BID    meropenem  500 mg IntraVENous Q8H    cloNIDine  0.1 mg Oral BID     PRN Meds: sodium chloride flush, sodium chloride, ondansetron **OR** ondansetron, polyethylene glycol, acetaminophen **OR** acetaminophen, albuterol sulfate HFA, glucose, dextrose, glucagon (rDNA), dextrose      Intake/Output Summary (Last 24 hours) at 4/11/2022 0651  Last data filed at 4/10/2022 1730  Gross per 24 hour   Intake 1040 ml   Output --   Net 1040 ml       Physical Exam Performed:    BP (!) 168/96   Pulse 81   Temp 97.7 °F (36.5 °C) (Oral)   Resp 16   Ht 5' 7\" (1.702 m)   Wt (!) 302 lb 11.1 oz (137.3 kg)   LMP 03/09/2022   SpO2 97%   BMI 47.41 kg/m²     General appearance: No apparent distress  Neck: Supple. Respiratory:  Normal respiratory effort. Clear to auscultation, bilaterally without Rales/Wheezes/Rhonchi. Cardiovascular: Regular rate and rhythm with normal S1/S2 without murmurs, rubs or gallops. Abdomen: Soft, non-tender  Musculoskeletal: No clubbing  Skin: Skin color, texture, turgor normal.  No rashes or lesions.   Neurologic:  No focal weakness  Psychiatric: Alert and oriented  Capillary Refill: Brisk,3 seconds, normal   Peripheral Pulses: +2 palpable, equal bilaterally       Labs:   Recent Labs     04/09/22  0905 04/10/22  0647   WBC 4.3 13.3*   HGB 11.3* 10.5*   HCT 33.4* 31.7*    231     Recent Labs     04/09/22  0905 04/09/22  1610 04/10/22  0647   * 136 139   K 3.9 3.9 3.8    102 105   CO2 18* 18* 17*   BUN 38* 42* 43*   CREATININE 3.8* 3.7* 3.6*   CALCIUM 9.1 8.8 8.7     Recent Labs     04/09/22  0905 04/10/22  0647   AST 20 25   ALT 14 17   BILITOT 0.9 0.8   ALKPHOS 205* 156*     No results for input(s): INR in the last 72 hours. No results for input(s): Meredith Coni in the last 72 hours. Urinalysis:      Lab Results   Component Value Date    NITRU Negative 04/09/2022    WBCUA >900 04/09/2022    BACTERIA 2+ 04/09/2022    RBCUA see below 04/09/2022    BLOODU MODERATE 04/09/2022    SPECGRAV 1.020 04/09/2022    GLUCOSEU Negative 04/09/2022       Radiology:  CT ABDOMEN PELVIS WO CONTRAST Additional Contrast? None   Final Result   1. 3 mm right UVJ calculus with moderate hydronephrosis. Additional   nonobstructive lower pole 5 mm calculus on the right. 2. Hepatomegaly with mild heterogeneity of the hepatic parenchyma. 3. No acute bowel pathology.                  Assessment/Plan:    Active Hospital Problems    Diagnosis     UTI (urinary tract infection) [N39.0]     Obstructed, uropathy [N13.9]     Elevated lactic acid level [Q17.52]     Complicated UTI (urinary tract infection) [N39.0]     CKD (chronic kidney disease) [N18.9]     MIN (acute kidney injury) ([facility] [N17.9]     Morbid (severe) obesity due to excess calories ([facility] [E66.01]     Type 2 diabetes mellitus ([address] [E11.9]     Anemia [D64.9]      1.  UTI, complicated, with obstructive uropathy, did receive p.o. antibiotics i before admissions, and did have UTI in the past not in the last few months ago, still on meropenem at this time will de-escalate based on urine culture  sensitivity, urine culture positive for E. coli. 2.  Suspecting sepsis even though patient does not completely meet SIRS criteria she stated she had fever and chills at home, on presentation her respiratory rate at 24.  3.  Obstructive uropathy, urology consulted, status post cystoscopy and stent placement on the right side. 4.  Acute on chronic kidney injury likely aggravated by obstructive uropathy, urology consulted as above, consulted nephrology, morning labs pending  5.  Elevated lactic acid, improved with IV fluid  6.  Diabetes mellitus, sliding scale  7.  Anemia, chronic, follow-up as outpatient  8.  Morbid obesity, complicating current presentation and increasing morbidity, counseled for weight loss    Diet: ADULT DIET;  Regular; 4 carb choices (60 gm/meal)  Code Status: Full Code        Kike Ram MD

## 2022-04-11 NOTE — PLAN OF CARE
NUTRITION ASSESSMENT     Assessment Type: Initial Assessment   Reason for Visit: Registered Dietitian Evaluation   Chart, Medications and Lab Results Reviewed: Yes  Nutrition Risk Factors: Enteral Nutrition and Prematurity   Food Allergies: no     Demographic/Anthropometrics Information   Gender: female   Birth Gestational Age: 32w2d  Day of Life: 7 day old Corrected Gestational Age: 33w2d  Birth Size: AGA     Birth Weight: 3 lb 11.3 oz (1680 g)  Birth HC:  29.5 cm Birth Length: 16.54\"/42 cm   %tile: 44  %tile: 61  %tile: 55    Z-score: -0.16  Z-score: 0.27  Z-score: 0.13    Goal: 217 g/wk (31g/day)  Goal: 0.91 cm/wk  Goal: 1.38 cm/wk      Growth Curve Used: Ruben     Current Weight:(!) 1660 g (22 1200)  Current HC: 30.5 cm Current Length: 15.75\" (40 cm) (22 0900)   %tile: 27  %tile: 75  %tile: 20    Z-score: -0.61  Z-score: 0.68  Z-score: -0.85    Goal: 219 g/wk (31.3g/day)  Goal: 0.90 cm/wk  Goal: 1.36 cm/wk      Adequacy of Growth:   Weight Change Since Birth: -1%   Weight gain velocity NA% of reference standard (219 g/week) to maintain current percentile   Growth trends: Weight for age change Z-score (from birth): -0.45   Growth Analysis/Comments: Monitor Overall Growth     Nutritional/Medical Pertinent Data:   Principal Problem:    Prematurity  Active Problems:    Hyperbilirubinemia of prematurity    Apnea of prematurity    Prematurity, 1,500-1,749 grams, 31-32 completed weeks    Liveborn by     Triplet birth    RDS (respiratory distress syndrome in the )     hypoglycemia    Respiratory Support: on 3L O2 HFNC   Labs: reviewed      Dosing Weight: 1700 g     Current Diet Order    Enteral/PO: Breast Milk with HMF 24 kcal/oz @ 24 ml Q3 hours via NG   IVF/Meds: caffeine     Nutrition Intake Analysis and Assessment:   Current nutrition prescription at 113 ml/kg/day provides 90 kcal/kg/day, and 2.9 gm protein/kg/day   Adequacy of Intake: Meeting 82% estimated energy and 97% protein  Problem: Falls - Risk of:  Goal: Will remain free from falls  Description: Will remain free from falls  4/11/2022 0106 by María Natarajan RN  Outcome: Ongoing     Problem: Falls - Risk of:  Goal: Absence of physical injury  Description: Absence of physical injury  4/11/2022 0106 by María Natarajan RN  Outcome: Ongoing     Problem: Pain:  Goal: Pain level will decrease  Description: Pain level will decrease  4/11/2022 0106 by María Natarajan RN  Outcome: Ongoing     Problem: Pain:  Goal: Control of acute pain  Description: Control of acute pain  4/11/2022 0106 by María Natarajan RN  Outcome: Ongoing     Problem: Pain:  Goal: Control of chronic pain  Description: Control of chronic pain  4/11/2022 0106 by María Natarajan RN  Outcome: Ongoing     Problem: Infection, Septic Shock:  Goal: Will show no infection signs and symptoms  Description: Will show no infection signs and symptoms  4/11/2022 0106 by María Natarajan RN  Outcome: Ongoing     Problem: Infection - Ventilator-Associated Pneumonia:  Goal: Absence of pulmonary infection  Description: Absence of pulmonary infection  4/11/2022 0106 by María Natarajan RN  Outcome: Ongoing     Problem: Serum Glucose Level - Abnormal:  Goal: Ability to maintain appropriate glucose levels will improve  Description: Ability to maintain appropriate glucose levels will improve  4/11/2022 0106 by María Natarajan RN  Outcome: Ongoing     Problem: Tissue Perfusion, Altered:  Goal: Circulatory function within specified parameters  Description: Circulatory function within specified parameters  4/11/2022 0106 by María Natarajan RN  Outcome: Ongoing     Problem: Venous Thromboembolism:  Goal: Will show no signs or symptoms of venous thromboembolism  Description: Will show no signs or symptoms of venous thromboembolism  4/11/2022 0106 by María Natarajan RN  Outcome: Ongoing     Problem: Venous Thromboembolism:  Goal: Absence of signs or symptoms of impaired coagulation  Description: Absence of needs   Additional Information/Diet Tolerance: Patient is tolerating NG bolus feeds over 45 minutes. TPN discontinued. No emesis noted.     Estimated Nutritional Needs:   Energy: 110-130 kcal/kg/day   Protein: 3-4 g/kg/day   Fluid: 150 ml/kg/day or per MD discretion     Current Nutrition Status: Moderately Compromised     NUTRITION DIAGNOSIS (PES STATEMENT)   Unable to Initiate Oral Feeding related to Intubation/ Respiratory status as evidenced by Other: Need for NG feeds     Nutrition Plan   Current Nutrition Therapy: Enteral Feedings and Monitor Weight   Continue Nutrition Therapy: Yes   Monitor: Calorie and Protein Intake, Enteral/Parenteral Tolerance, Intake & Output, Vitamin/Mineral Supplement and Weight   Recommend: Vitamin/Mineral Supplement   Discharge Needs: None   Care Plan Discussed With: Multidisciplinary Team     Goal : Meet 75 to 100% of estimated needs/all source nutrient intake   Goal Progress: Initiated   Timeframe to achieve: By Discharge     Monitoring and Evaluation Criteria/Goals:   1. Continue NG bolus feeds; continue to advance 10-20 ml/kg/d  2. Feeding Goal: 150-160 ml/kg/d Breast Milk with HMF 24 kcal/oz to provide 120-128 kcals/kg/d and 3.8-4.1 gm protein/kg/d  3. Add vitamins: DVS 0.5 ml/day and PVS with iron 0.5 ml/day to provide and additional 400 iu vitamin D daily    Will continue to monitor clinical progress/changes, nutrition tolerance, growth parameters     Malnutrition Status:   Unable to determine malnutrition status at this time per indicators      signs or symptoms of impaired coagulation  4/11/2022 0106 by Adia Small RN  Outcome: Ongoing     Problem: Activity:  Goal: Risk for activity intolerance will decrease  Description: Risk for activity intolerance will decrease  4/11/2022 0106 by Adia Small RN  Outcome: Ongoing     Problem: Bowel/Gastric:  Goal: Bowel function will improve  Description: Bowel function will improve  4/11/2022 0106 by Adia Small RN  Outcome: Ongoing     Problem: Bowel/Gastric:  Goal: Diagnostic test results will improve  Description: Diagnostic test results will improve  4/11/2022 0106 by Adia Small RN  Outcome: Ongoing     Problem: Bowel/Gastric:  Goal: Occurrences of nausea will decrease  Description: Occurrences of nausea will decrease  4/11/2022 0106 by Adia Small RN  Outcome: Ongoing     Problem:  Bowel/Gastric:  Goal: Occurrences of vomiting will decrease  Description: Occurrences of vomiting will decrease  4/11/2022 0106 by Adia Small RN  Outcome: Ongoing     Problem: Fluid Volume:  Goal: Maintenance of adequate hydration will improve  Description: Maintenance of adequate hydration will improve  4/11/2022 0106 by Adia Small RN  Outcome: Ongoing     Problem: Health Behavior:  Goal: Ability to state signs and symptoms to report to health care provider will improve  Description: Ability to state signs and symptoms to report to health care provider will improve  4/11/2022 0106 by Adia Small RN  Outcome: Ongoing     Problem: Physical Regulation:  Goal: Complications related to the disease process, condition or treatment will be avoided or minimized  Description: Complications related to the disease process, condition or treatment will be avoided or minimized  4/11/2022 0106 by Adia Small RN  Outcome: Ongoing     Problem: Physical Regulation:  Goal: Ability to maintain clinical measurements within normal limits will improve  Description: Ability to maintain clinical measurements within normal limits will improve  4/11/2022 0106 by Karen Otero RN  Outcome: Ongoing     Problem: Sensory:  Goal: Pain level will decrease  Description: Pain level will decrease  4/11/2022 0106 by Karen Otero RN  Outcome: Ongoing     Problem: Sensory:  Goal: Ability to identify factors that increase the pain will improve  Description: Ability to identify factors that increase the pain will improve  4/11/2022 0106 by Karen Otero RN  Outcome: Ongoing     Problem: Sensory:  Goal: Ability to notify healthcare provider of pain before it becomes unmanageable or unbearable will improve  Description: Ability to notify healthcare provider of pain before it becomes unmanageable or unbearable will improve  4/11/2022 0106 by Karen Otero RN  Outcome: Ongoing

## 2022-04-11 NOTE — PROGRESS NOTES
[facility]                                               Progress note    Summary:   Keyanna Ramires is being seen by nephrology for MIN. Admitted with UTI. Diagnosed with obstructive uropathy due to right nephrolithiasis. S/p right ureteral stent 4/10/22. Interval History  Seen at bedside. Up to the chair. No complaints, feeling much better, no more pain. PO intake is good, no edema no NVD. Says she is passing urine without difficulty     /99  96%on 3 L   Afebrile. UO not recorded. Labs reviewed. Sodium 138 potassium 4.2 bicarb 17 BUN 38 creatinine 2.6  Albumin 2.8 WBC 18.1 hemoglobin 12.3    Plan:   - PO intake is adequate, stop IVF  - sodium bicarb 650 mg TID for acidosis while MIN recovering   - Cr coming down. - s/p ureteral stent, record UO      Pedro Pablo Vo MD  [facility]  Office: [phone]    Assessment:   Acute Kidney Injury  MIN likely due to - MIN likely due to urinary tract infection, sepsis, hydronephrosis  Cr on consultation-3.9, was  Baseline Cr-not available-last creatinine was 1.6-EGFR of 35 mm/min on 8/25/2020  She may have CKD stage IIIb from diabetes and hypertension      UA-moderate blood, moderate LE, more than 300 protein-currently on IV antibiotics  Renal Imaging:-4/22-right UVJ calculus with moderate hydronephrosis  Echo: Normal 1/2008-no recent 1 in the system    Hypertension   BP: (125-216)/()  Pulse:  []   BP goal inpatient [phone] systolic inpatient  BP was as low as 225 systolic on arrival-medicine has to be adjusted      Renal stones  Has 5 mm calculus on the right UVJ  Will need work-up as an outpatient for the cause of nephrolithiasis    Proteinuria  Protein in UA more than 300  We will need protein creatinine ratio once urinary tract infection is controlled        Elevated lactic acid  Diabetes mellitus  Anemia    Urinary tract infection      ROS:   Positives Listed Bold.  All other remaining systems are negative. Constitutional:  fever, chills, weakness, weight change, fatigue,      Skin:  rash, pruritus, hair loss, bruising, dry skin, petechiae. Head, Face, Neck   headaches, swelling,  cervical adenopathy. Respiratory: shortness of breath, cough, or wheezing  Cardiovascular: chest pain, palpitations, dizzy, edema  Gastrointestinal: nausea, vomiting, diarrhea, constipation,belly pain    Yellow skin, blood in stool  Musculoskeletal:  back pain, muscle weakness, gait problems,       joint pain or swelling. Genitourinary:  dysuria, poor urine flow, flank pain, blood in urine  Neurologic:  vertigo, TIA'S, syncope, seizures, focal weakness  Psychosocial:  insomnia, anxiety, or depression. Additional positive findings: -     PMH:   Past medical history, surgical history, social history, family history are reviewed and updated as appropriate. Reviewed current medication list.   Allergies reviewed and updated as needed. PE:   Vitals:    04/11/22 0829   BP:    Pulse:    Resp:    Temp:    SpO2: 96%       General appearance:  in NAD, fully alert and oriented. Comfortable. HEENT: EOM intact, no icterus. Trachea is midline. Neck : No masses, appears symmetrical, no JVD  Respiratory: Respiratory effort appears normal, bilateral equal chest rise, no wheeze, no crackles   Cardiovascular: Ausculation shows RRR no edema  Abdomen: No visible mass or tenderness, non distended. Musculoskeletal:  Joints with no swelling or deformity. Skin:no rashes, ulcers, induration, no jaundice. Neuro: face symmetric, no focal deficits. Appropriate responses.        Lab Results   Component Value Date    CREATININE 2.6 (H) 04/11/2022    BUN 38 (H) 04/11/2022     04/11/2022    K 4.2 04/11/2022     04/11/2022    CO2 17 (L) 04/11/2022      Lab Results   Component Value Date    WBC 18.1 (H) 04/11/2022    HGB 12.3 04/11/2022    HCT 36.0 04/11/2022    .5 (H) 04/11/2022     04/11/2022     Lab Results Component Value Date    CALCIUM 9.7 04/11/2022    PHOS 2.1 (L) 08/25/2020

## 2022-04-12 LAB
A/G RATIO: 1 (ref 1.1–2.2)
ALBUMIN SERPL-MCNC: 3.1 G/DL (ref 3.4–5)
ALP BLD-CCNC: 161 U/L (ref 40–129)
ALT SERPL-CCNC: 25 U/L (ref 10–40)
ANION GAP SERPL CALCULATED.3IONS-SCNC: 17 MMOL/L (ref 3–16)
AST SERPL-CCNC: 24 U/L (ref 15–37)
ATYPICAL LYMPHOCYTE RELATIVE PERCENT: 1 % (ref 0–6)
BANDED NEUTROPHILS RELATIVE PERCENT: 1 % (ref 0–7)
BASOPHILS ABSOLUTE: 0.1 K/UL (ref 0–0.2)
BASOPHILS RELATIVE PERCENT: 1 %
BILIRUB SERPL-MCNC: 0.3 MG/DL (ref 0–1)
BUN BLDV-MCNC: 41 MG/DL (ref 7–20)
CALCIUM SERPL-MCNC: 9.8 MG/DL (ref 8.3–10.6)
CHLORIDE BLD-SCNC: 99 MMOL/L (ref 99–110)
CO2: 21 MMOL/L (ref 21–32)
CREAT SERPL-MCNC: 2.3 MG/DL (ref 0.6–1.1)
EOSINOPHILS ABSOLUTE: 0 K/UL (ref 0–0.6)
EOSINOPHILS RELATIVE PERCENT: 0 %
GFR AFRICAN AMERICAN: 28
GFR NON-AFRICAN AMERICAN: 23
GLUCOSE BLD-MCNC: 111 MG/DL (ref 70–99)
GLUCOSE BLD-MCNC: 112 MG/DL (ref 70–99)
GLUCOSE BLD-MCNC: 115 MG/DL (ref 70–99)
GLUCOSE BLD-MCNC: 116 MG/DL (ref 70–99)
GLUCOSE BLD-MCNC: 90 MG/DL (ref 70–99)
HCT VFR BLD CALC: 33.7 % (ref 36–48)
HEMOGLOBIN: 11.6 G/DL (ref 12–16)
LYMPHOCYTES ABSOLUTE: 1 K/UL (ref 1–5.1)
LYMPHOCYTES RELATIVE PERCENT: 8 %
MACROCYTES: ABNORMAL
MCH RBC QN AUTO: 34.8 PG (ref 26–34)
MCHC RBC AUTO-ENTMCNC: 34.5 G/DL (ref 31–36)
MCV RBC AUTO: 100.8 FL (ref 80–100)
MONOCYTES ABSOLUTE: 1.4 K/UL (ref 0–1.3)
MONOCYTES RELATIVE PERCENT: 12 %
MYELOCYTE PERCENT: 1 %
NEUTROPHILS ABSOLUTE: 8.8 K/UL (ref 1.7–7.7)
NEUTROPHILS RELATIVE PERCENT: 76 %
PDW BLD-RTO: 14.4 % (ref 12.4–15.4)
PERFORMED ON: ABNORMAL
PERFORMED ON: NORMAL
PLATELET # BLD: 265 K/UL (ref 135–450)
PLATELET SLIDE REVIEW: ADEQUATE
PMV BLD AUTO: 8.9 FL (ref 5–10.5)
POTASSIUM SERPL-SCNC: 3.8 MMOL/L (ref 3.5–5.1)
PROCALCITONIN: 26.49 NG/ML (ref 0–0.15)
RBC # BLD: 3.34 M/UL (ref 4–5.2)
SLIDE REVIEW: ABNORMAL
SODIUM BLD-SCNC: 137 MMOL/L (ref 136–145)
TOTAL PROTEIN: 6.3 G/DL (ref 6.4–8.2)
WBC # BLD: 11.3 K/UL (ref 4–11)

## 2022-04-12 PROCEDURE — 6360000002 HC RX W HCPCS: Performed by: UROLOGY

## 2022-04-12 PROCEDURE — 6370000000 HC RX 637 (ALT 250 FOR IP): Performed by: UROLOGY

## 2022-04-12 PROCEDURE — 94760 N-INVAS EAR/PLS OXIMETRY 1: CPT

## 2022-04-12 PROCEDURE — 6360000002 HC RX W HCPCS: Performed by: INTERNAL MEDICINE

## 2022-04-12 PROCEDURE — 6370000000 HC RX 637 (ALT 250 FOR IP): Performed by: INTERNAL MEDICINE

## 2022-04-12 PROCEDURE — 2580000003 HC RX 258: Performed by: UROLOGY

## 2022-04-12 PROCEDURE — 6370000000 HC RX 637 (ALT 250 FOR IP): Performed by: NURSE PRACTITIONER

## 2022-04-12 PROCEDURE — 84145 PROCALCITONIN (PCT): CPT

## 2022-04-12 PROCEDURE — 2500000003 HC RX 250 WO HCPCS: Performed by: INTERNAL MEDICINE

## 2022-04-12 PROCEDURE — 36415 COLL VENOUS BLD VENIPUNCTURE: CPT

## 2022-04-12 PROCEDURE — 1200000000 HC SEMI PRIVATE

## 2022-04-12 PROCEDURE — 85025 COMPLETE CBC W/AUTO DIFF WBC: CPT

## 2022-04-12 PROCEDURE — 80053 COMPREHEN METABOLIC PANEL: CPT

## 2022-04-12 PROCEDURE — 94640 AIRWAY INHALATION TREATMENT: CPT

## 2022-04-12 RX ORDER — CLONIDINE HYDROCHLORIDE 0.1 MG/1
0.2 TABLET ORAL 2 TIMES DAILY
Status: DISCONTINUED | OUTPATIENT
Start: 2022-04-12 | End: 2022-04-12

## 2022-04-12 RX ORDER — METOPROLOL SUCCINATE 25 MG/1
25 TABLET, EXTENDED RELEASE ORAL ONCE
Status: COMPLETED | OUTPATIENT
Start: 2022-04-12 | End: 2022-04-12

## 2022-04-12 RX ORDER — NIFEDIPINE 30 MG/1
60 TABLET, EXTENDED RELEASE ORAL 2 TIMES DAILY
Status: DISCONTINUED | OUTPATIENT
Start: 2022-04-12 | End: 2022-04-13 | Stop reason: HOSPADM

## 2022-04-12 RX ORDER — LABETALOL HYDROCHLORIDE 5 MG/ML
10 INJECTION, SOLUTION INTRAVENOUS ONCE
Status: COMPLETED | OUTPATIENT
Start: 2022-04-12 | End: 2022-04-12

## 2022-04-12 RX ORDER — CLONIDINE HYDROCHLORIDE 0.1 MG/1
0.2 TABLET ORAL 2 TIMES DAILY
Status: DISCONTINUED | OUTPATIENT
Start: 2022-04-12 | End: 2022-04-13 | Stop reason: HOSPADM

## 2022-04-12 RX ORDER — HYDRALAZINE HYDROCHLORIDE 20 MG/ML
10 INJECTION INTRAMUSCULAR; INTRAVENOUS ONCE
Status: COMPLETED | OUTPATIENT
Start: 2022-04-12 | End: 2022-04-12

## 2022-04-12 RX ORDER — METOPROLOL SUCCINATE 50 MG/1
100 TABLET, EXTENDED RELEASE ORAL DAILY
Status: DISCONTINUED | OUTPATIENT
Start: 2022-04-13 | End: 2022-04-13 | Stop reason: HOSPADM

## 2022-04-12 RX ORDER — LEVOFLOXACIN 5 MG/ML
750 INJECTION, SOLUTION INTRAVENOUS
Status: DISCONTINUED | OUTPATIENT
Start: 2022-04-12 | End: 2022-04-13 | Stop reason: HOSPADM

## 2022-04-12 RX ORDER — CLONIDINE HYDROCHLORIDE 0.1 MG/1
0.1 TABLET ORAL ONCE
Status: COMPLETED | OUTPATIENT
Start: 2022-04-12 | End: 2022-04-12

## 2022-04-12 RX ADMIN — NIFEDIPINE 60 MG: 30 TABLET, FILM COATED, EXTENDED RELEASE ORAL at 14:26

## 2022-04-12 RX ADMIN — ATORVASTATIN CALCIUM 40 MG: 40 TABLET, FILM COATED ORAL at 20:58

## 2022-04-12 RX ADMIN — MEROPENEM 500 MG: 500 INJECTION, POWDER, FOR SOLUTION INTRAVENOUS at 08:06

## 2022-04-12 RX ADMIN — SODIUM BICARBONATE 650 MG: 650 TABLET ORAL at 08:02

## 2022-04-12 RX ADMIN — LABETALOL HYDROCHLORIDE 10 MG: 5 INJECTION INTRAVENOUS at 15:46

## 2022-04-12 RX ADMIN — ALLOPURINOL 100 MG: 100 TABLET ORAL at 08:02

## 2022-04-12 RX ADMIN — LEVOFLOXACIN 750 MG: 750 INJECTION, SOLUTION INTRAVENOUS at 10:41

## 2022-04-12 RX ADMIN — LEVOTHYROXINE SODIUM 175 MCG: 0.12 TABLET ORAL at 07:41

## 2022-04-12 RX ADMIN — ACETAMINOPHEN 650 MG: 325 TABLET ORAL at 20:57

## 2022-04-12 RX ADMIN — CLONIDINE HYDROCHLORIDE 0.1 MG: 0.1 TABLET ORAL at 01:13

## 2022-04-12 RX ADMIN — Medication 2 PUFF: at 19:31

## 2022-04-12 RX ADMIN — SODIUM BICARBONATE 650 MG: 650 TABLET ORAL at 20:56

## 2022-04-12 RX ADMIN — MEROPENEM 500 MG: 500 INJECTION, POWDER, FOR SOLUTION INTRAVENOUS at 01:19

## 2022-04-12 RX ADMIN — Medication 2 PUFF: at 08:24

## 2022-04-12 RX ADMIN — HYDRALAZINE HYDROCHLORIDE 10 MG: 20 INJECTION INTRAMUSCULAR; INTRAVENOUS at 17:32

## 2022-04-12 RX ADMIN — SODIUM CHLORIDE, PRESERVATIVE FREE 10 ML: 5 INJECTION INTRAVENOUS at 08:02

## 2022-04-12 RX ADMIN — METOPROLOL SUCCINATE 75 MG: 50 TABLET, EXTENDED RELEASE ORAL at 08:01

## 2022-04-12 RX ADMIN — SODIUM CHLORIDE, PRESERVATIVE FREE 10 ML: 5 INJECTION INTRAVENOUS at 20:58

## 2022-04-12 RX ADMIN — CYCLOBENZAPRINE 5 MG: 10 TABLET, FILM COATED ORAL at 08:01

## 2022-04-12 RX ADMIN — AMLODIPINE BESYLATE 10 MG: 10 TABLET ORAL at 08:02

## 2022-04-12 RX ADMIN — Medication 1 TABLET: at 08:02

## 2022-04-12 RX ADMIN — CLONIDINE HYDROCHLORIDE 0.1 MG: 0.1 TABLET ORAL at 08:02

## 2022-04-12 RX ADMIN — METOPROLOL SUCCINATE 25 MG: 25 TABLET, EXTENDED RELEASE ORAL at 10:38

## 2022-04-12 RX ADMIN — SODIUM BICARBONATE 650 MG: 650 TABLET ORAL at 14:26

## 2022-04-12 RX ADMIN — NIFEDIPINE 60 MG: 30 TABLET, FILM COATED, EXTENDED RELEASE ORAL at 20:56

## 2022-04-12 RX ADMIN — CLONIDINE HYDROCHLORIDE 0.2 MG: 0.1 TABLET ORAL at 18:43

## 2022-04-12 RX ADMIN — ENOXAPARIN SODIUM 30 MG: 100 INJECTION SUBCUTANEOUS at 08:02

## 2022-04-12 ASSESSMENT — PAIN DESCRIPTION - PAIN TYPE: TYPE: ACUTE PAIN

## 2022-04-12 ASSESSMENT — PAIN DESCRIPTION - FREQUENCY: FREQUENCY: INTERMITTENT

## 2022-04-12 ASSESSMENT — PAIN DESCRIPTION - ORIENTATION: ORIENTATION: MID

## 2022-04-12 ASSESSMENT — PAIN DESCRIPTION - ONSET: ONSET: ON-GOING

## 2022-04-12 ASSESSMENT — PAIN DESCRIPTION - DESCRIPTORS: DESCRIPTORS: ACHING;DISCOMFORT;DULL;HEADACHE

## 2022-04-12 ASSESSMENT — PAIN DESCRIPTION - LOCATION: LOCATION: HEAD

## 2022-04-12 ASSESSMENT — PAIN SCALES - GENERAL
PAINLEVEL_OUTOF10: 2
PAINLEVEL_OUTOF10: 0

## 2022-04-12 ASSESSMENT — PAIN - FUNCTIONAL ASSESSMENT: PAIN_FUNCTIONAL_ASSESSMENT: ACTIVITIES ARE NOT PREVENTED

## 2022-04-12 ASSESSMENT — PAIN DESCRIPTION - PROGRESSION: CLINICAL_PROGRESSION: GRADUALLY IMPROVING

## 2022-04-12 ASSESSMENT — PAIN DESCRIPTION - DIRECTION: RADIATING_TOWARDS: MID

## 2022-04-12 NOTE — PLAN OF CARE
Problem: Falls - Risk of:  Goal: Will remain free from falls  Description: Will remain free from falls  Outcome: Ongoing  Goal: Absence of physical injury  Description: Absence of physical injury  Outcome: Ongoing   Pt free from falls this shift. Fall precautions in place at all times. Call light always within reach. Pt able and agreeable to contact for safety appropriately. Problem: Pain:  Description: Pain management should include both nonpharmacologic and pharmacologic interventions. Goal: Pain level will decrease  Description: Pain level will decrease  Outcome: Ongoing  Goal: Control of acute pain  Description: Control of acute pain  Outcome: Ongoing  Goal: Control of chronic pain  Description: Control of chronic pain  Outcome: Ongoing   Pt able to express presence/absence of pain and rate pain appropriately using numerical scale. Pain/discomfort being managed with PRN analgesics per MD orders (see MAR). Pain assessed every shift and after interventions. Problem: Discharge Planning:  Goal: Discharged to appropriate level of care  Description: Discharged to appropriate level of care  Outcome: Ongoing   Continuing to work with patient and health care team on discharge plan. Discharge instructions and medication management will be reviewed prior to discharge.

## 2022-04-12 NOTE — PROGRESS NOTES
Morning BP was 183/115 - morning medicine was given, manual BP recheck was 164/98 . MD notified. Patient has all needs meet. Will continue to monitor.      Electronically signed by Ailyn Freeman RN on 4/12/2022 at 9:42 AM

## 2022-04-12 NOTE — PROGRESS NOTES
Hospitalist Progress Note      PCP: [address] [place] [address]    Date of Admission: 4/9/2022        Subjective: feels better, no flank pain, fever or chills, no sob, chest pain or blurry vision. Medications:  Reviewed    Infusion Medications    sodium chloride 100 mL/hr at 04/12/22 0117    dextrose       Scheduled Medications    [START ON 4/13/2022] metoprolol succinate  100 mg Oral Daily    metoprolol succinate  25 mg Oral Once    levofloxacin  750 mg IntraVENous Q48H    sodium bicarbonate  650 mg Oral TID    folic acid-pyridoxine-cyanocobalamine  1 tablet Oral Daily    sodium chloride flush  5-40 mL IntraVENous 2 times per day    enoxaparin  30 mg SubCUTAneous Daily    allopurinol  100 mg Oral Daily    amLODIPine  10 mg Oral Daily    atorvastatin  40 mg Oral Nightly    cyclobenzaprine  5 mg Oral Daily    levothyroxine  175 mcg Oral QAM AC    insulin lispro  0-6 Units SubCUTAneous TID WC    insulin lispro  0-3 Units SubCUTAneous Nightly    budesonide-formoterol  2 puff Inhalation BID    cloNIDine  0.1 mg Oral BID     PRN Meds: sodium chloride flush, sodium chloride, ondansetron **OR** ondansetron, polyethylene glycol, acetaminophen **OR** acetaminophen, albuterol sulfate HFA, glucose, dextrose, glucagon (rDNA), dextrose      Intake/Output Summary (Last 24 hours) at 4/12/2022 1014  Last data filed at 4/12/2022 0923  Gross per 24 hour   Intake 240 ml   Output 1000 ml   Net -760 ml       Physical Exam Performed:    BP (!) 164/98 Comment: manual   Pulse 88   Temp 98.4 °F (36.9 °C) (Oral)   Resp 20   Ht 5' 7\" (1.702 m)   Wt 291 lb 14.2 oz (132.4 kg)   LMP 03/09/2022   SpO2 99%   BMI 45.72 kg/m²     General appearance: No apparent distress,  Neck: Supple. Respiratory:  Normal respiratory effort. Clear to auscultation, bilaterally without Rales/Wheezes/Rhonchi. Cardiovascular: Regular rate and rhythm with normal S1/S2 without murmurs, rubs or gallops.   Abdomen: Soft, non-tender  Musculoskeletal: No clubbing, cyanosis   Skin: Skin color, texture, turgor normal.  No rashes or lesions. Neurologic:  No deficit    Psychiatric: Alert and oriented  Capillary Refill: Brisk,3 seconds, normal   Peripheral Pulses: +2 palpable, equal bilaterally       Labs:   Recent Labs     04/10/22  0647 04/11/22  0717 04/12/22  0547   WBC 13.3* 18.1* 11.3*   HGB 10.5* 12.3 11.6*   HCT 31.7* 36.0 33.7*    265 265     Recent Labs     04/10/22  0647 04/11/22  0717 04/12/22  0547    138 137   K 3.8 4.2 3.8    103 99   CO2 17* 17* 21   BUN 43* 38* 41*   CREATININE 3.6* 2.6* 2.3*   CALCIUM 8.7 9.7 9.8     Recent Labs     04/10/22  0647 04/11/22  0717 04/12/22  0547   AST 25 22 24   ALT 17 21 25   BILITOT 0.8 0.4 0.3   ALKPHOS 156* 176* 161*     No results for input(s): INR in the last 72 hours. No results for input(s): Earlis Hudgins in the last 72 hours. Urinalysis:      Lab Results   Component Value Date    NITRU Negative 04/09/2022    WBCUA >900 04/09/2022    BACTERIA 2+ 04/09/2022    RBCUA see below 04/09/2022    BLOODU MODERATE 04/09/2022    SPECGRAV 1.020 04/09/2022    GLUCOSEU Negative 04/09/2022       Radiology:  CT ABDOMEN PELVIS WO CONTRAST Additional Contrast? None   Final Result   1. 3 mm right UVJ calculus with moderate hydronephrosis. Additional   nonobstructive lower pole 5 mm calculus on the right. 2. Hepatomegaly with mild heterogeneity of the hepatic parenchyma. 3. No acute bowel pathology.                  Assessment/Plan:    Active Hospital Problems    Diagnosis     UTI (urinary tract infection) [N39.0]     Obstructed, uropathy [N13.9]     Elevated lactic acid level [K22.47]     Complicated UTI (urinary tract infection) [N39.0]     CKD (chronic kidney disease) [N18.9]     MIN (acute kidney injury) ([facility] [N17.9]     Morbid (severe) obesity due to excess calories ([facility] [E66.01]     Type 2 diabetes mellitus ([facility] [E11.9]     Anemia [D64.9]      1.  UTI, complicated, with obstructive uropathy, did receive p.o. antibiotics before admissions, and did have UTI in the past not in the last few months ago,  admitted on meropenem, urine culture positive for E. coli, sensitive to Levaquin, will change to Levaquin renally dosed we will continue to monitor per urology patient need to be on total of 2 weeks of IV or p.o. antibiotics   2.  Suspecting sepsis even though patient does not completely meet SIRS criteria she stated she had fever and chills at home, on presentation her respiratory rate at 24.  3.  Obstructive uropathy, urology consulted, status post cystoscopy and stent placement on the right side. Needs to follow-up with urology in 2 weeks. 4.  Acute on chronic kidney injury likely aggravated by obstructive uropathy, urology consulted as above, consulted nephrology,  creatinine improving. 5.  Uncontrolled hypertension, adjusted medications multiple times, will increase further Toprol-XL today and will monitor  6. Elevated lactic acid, improved with IV fluid  7.  Diabetes mellitus, sliding scale  8.  Anemia, chronic, follow-up as outpatient  9.  Morbid obesity, complicating current presentation and increasing morbidity, counseled for weight loss    Diet: ADULT DIET;  Regular; 4 carb choices (60 gm/meal)  Code Status: Full Code        Ceferino Roberts MD

## 2022-04-12 NOTE — FLOWSHEET NOTE
Pt cont to have elevated bp's overnight. One time order for clonidine was given and was not effective. Will cont to monitor.

## 2022-04-12 NOTE — PROGRESS NOTES
Pt Name: Janki Pena Record Number: [id number]  Date of Birth 1975   Today's Date: 4/12/2022      Subjective:  Denies pain. BP running high    ROS: Constitutional: No fever    Vitals:  Vitals:    04/11/22 2353 04/12/22 0337 04/12/22 0343 04/12/22 0800   BP: (!) 178/104 (!) 181/103 (!) 176/118 (!) 183/115   Pulse: 87 88  88   Resp: 20 20  20   Temp: 98 °F (36.7 °C) 98.1 °F (36.7 °C)  98.4 °F (36.9 °C)   TempSrc: Oral Oral  Oral   SpO2: 95% 96%  99%   Weight:       Height:         I/O last 3 completed shifts:   In: 400 [P.O.:400]  Out: 1000 [Urine:1000]    Exam:  General: Awake, oriented, no acute distress  Respiratory: Nonlabored breathing  Abdomen: Soft, non-tender, non-distended, no masses  Skin: Skin color, texture, turgor normal, no rashes or lesions  Neurologic: no gross deficits    CURRENT MEDICATIONS   Scheduled Meds:   sodium bicarbonate  650 mg Oral TID    folic acid-pyridoxine-cyanocobalamine  1 tablet Oral Daily    metoprolol succinate  75 mg Oral Daily    sodium chloride flush  5-40 mL IntraVENous 2 times per day    enoxaparin  30 mg SubCUTAneous Daily    allopurinol  100 mg Oral Daily    amLODIPine  10 mg Oral Daily    atorvastatin  40 mg Oral Nightly    cyclobenzaprine  5 mg Oral Daily    levothyroxine  175 mcg Oral QAM AC    insulin lispro  0-6 Units SubCUTAneous TID WC    insulin lispro  0-3 Units SubCUTAneous Nightly    budesonide-formoterol  2 puff Inhalation BID    meropenem  500 mg IntraVENous Q8H    cloNIDine  0.1 mg Oral BID     Continuous Infusions:   sodium chloride 100 mL/hr at 04/12/22 0117    dextrose       PRN Meds:.sodium chloride flush, sodium chloride, ondansetron **OR** ondansetron, polyethylene glycol, acetaminophen **OR** acetaminophen, albuterol sulfate HFA, glucose, dextrose, glucagon (rDNA), dextrose    LABS     Recent Labs     04/10/22  0647 04/11/22  0717 04/12/22  0547   WBC 13.3* 18.1* 11.3*   HGB 10.5* 12.3 11.6*   HCT 31.7* 36.0 33.7*  265 265    138 137   K 3.8 4.2 3.8    103 99   CO2 17* 17* 21   BUN 43* 38* 41*   CREATININE 3.6* 2.6* 2.3*   CALCIUM 8.7 9.7 9.8   AST 25 22 24   ALT 17 21 25   BILITOT 0.8 0.4 0.3     Urine cx growing ecoli      ASSESSMENT   1. Hospital day # 3  2. Right ureteral stone sp stent 4/10/22  3. UTI- on merrem  4. MIN  PLAN   1. She will need 2 weeks of antibiotics and then I will arrange ureteroscopy.   My office will contact her to schedule this    Carlos Muse MD 4/12/2022 8:16 AM

## 2022-04-12 NOTE — PROGRESS NOTES
Patients BP at 1416 was 193/124, manual BP was taking by this RN, 186/112 in upper right arm. MD Neo Brito was notified, new order for labatelol 10mg was ordered and given, will recheck BP at approx. 1700 and will notify MD. Patient asymptomatic and all needs are meet. Will continue to monitor. Electronically signed by Iqra Echols RN on 4/12/2022 at 3:55 PM     Patients BP was 186/106 manually, MD notified, one time order for hydralazine was ordered and given. Will recheck in one hour and notify MD.     Electronically signed by Iqra Echols RN on 4/12/2022 at 5:41 PM     BP rechecked at 1830 and was 174/94, MD Neo Brito was notified, requested that scheduled nightly NIFEdipine and cloNIDine be given now and BP rechecked in 30 minutes. Pharmacy notified, will give medication and recheck. Electronically signed by Iqra Echols RN on 4/12/2022 at 6:35 PM     BP was checked again manual, was again 174/94. MD Neo Brito notified, no new orders at this time.      Electronically signed by Iqra Echols RN on 4/12/2022 at 7:20 PM

## 2022-04-12 NOTE — PROGRESS NOTES
[facility]                                               Progress note    Summary:   Severiano Ards is being seen by nephrology for MIN. Admitted with UTI. Diagnosed with obstructive uropathy due to right nephrolithiasis. S/p right ureteral stent 4/10/22. Interval History  Seen at bedside. Up to the chair. No complaints, feeling much better, asking to go home. PO intake is good, no edema no NVD. Says she is passing urine without difficulty     Blood pressure 164/98  She satting well on room air  She is on her home regimen of blood pressure medication she required an additional clonidine 0.1 and hydralazine 10 mg IV. Has also had as needed doses of metoprolol she had 25 mg this morning 25 mg yesterday and is on 100 mg daily today. Labs reviewed. Sodium 137 potassium 3.8 bicarb 21 BUN 41 creatinine 2.3  Calcium 9.8 albumin 3.1  A low hemoglobin 11.6    Plan:   -Blood pressure remains elevated change to nifedipine 60 mg twice daily and can increase her clonidine 0.2 mg BID. - sodium bicarb 650 mg TID for acidosis while MIN recovering   - Cr coming down.   - s/p ureteral stent, record UO      Cadence Rock MD  [facility]  Office: [phone]    Assessment:   Acute Kidney Injury  MIN likely due to - MIN likely due to urinary tract infection, sepsis, hydronephrosis  Cr on consultation-3.9, was  Baseline Cr-not available-last creatinine was 1.6-EGFR of 35 mm/min on 8/25/2020  She may have CKD stage IIIb from diabetes and hypertension      UA-moderate blood, moderate LE, more than 300 protein-currently on IV antibiotics  Renal Imaging:-4/22-right UVJ calculus with moderate hydronephrosis  Echo: Normal 1/2008-no recent 1 in the system    Hypertension   BP: (125-216)/()  Pulse:  []   BP goal inpatient 612-120 systolic inpatient  BP was as low as 987 systolic on arrival-medicine has to be adjusted      Renal stones  Has 5 mm calculus on the right UVJ  Will need work-up as an outpatient for the cause of nephrolithiasis    Proteinuria  Protein in UA more than 300  We will need protein creatinine ratio once urinary tract infection is controlled        Elevated lactic acid  Diabetes mellitus  Anemia    Urinary tract infection      ROS:   Positives Listed Bold. All other remaining systems are negative. Constitutional:  fever, chills, weakness, weight change, fatigue,      Skin:  rash, pruritus, hair loss, bruising, dry skin, petechiae. Head, Face, Neck   headaches, swelling,  cervical adenopathy. Respiratory: shortness of breath, cough, or wheezing  Cardiovascular: chest pain, palpitations, dizzy, edema  Gastrointestinal: nausea, vomiting, diarrhea, constipation,belly pain    Yellow skin, blood in stool  Musculoskeletal:  back pain, muscle weakness, gait problems,       joint pain or swelling. Genitourinary:  dysuria, poor urine flow, flank pain, blood in urine  Neurologic:  vertigo, TIA'S, syncope, seizures, focal weakness  Psychosocial:  insomnia, anxiety, or depression. Additional positive findings: -     PMH:   Past medical history, surgical history, social history, family history are reviewed and updated as appropriate. Reviewed current medication list.   Allergies reviewed and updated as needed. PE:   Vitals:    04/12/22 0930   BP: (!) 164/98   Pulse:    Resp:    Temp:    SpO2:        General appearance:  in NAD, fully alert and oriented. Comfortable. HEENT: EOM intact, no icterus. Trachea is midline. Neck : No masses, appears symmetrical, no JVD  Respiratory: Respiratory effort appears normal, bilateral equal chest rise, no wheeze, no crackles   Cardiovascular: Ausculation shows RRR no edema  Abdomen: No visible mass or tenderness, non distended. Musculoskeletal:  Joints with no swelling or deformity. Skin:no rashes, ulcers, induration, no jaundice. Neuro: face symmetric, no focal deficits. Appropriate responses.        Lab Results   Component Value Date CREATININE 2.3 (H) 04/12/2022    BUN 41 (H) 04/12/2022     04/12/2022    K 3.8 04/12/2022    CL 99 04/12/2022    CO2 21 04/12/2022      Lab Results   Component Value Date    WBC 11.3 (H) 04/12/2022    HGB 11.6 (L) 04/12/2022    HCT 33.7 (L) 04/12/2022    .8 (H) 04/12/2022     04/12/2022     Lab Results   Component Value Date    CALCIUM 9.8 04/12/2022    PHOS 2.1 (L) 08/25/2020

## 2022-04-13 VITALS
HEIGHT: 67 IN | TEMPERATURE: 97.9 F | RESPIRATION RATE: 20 BRPM | DIASTOLIC BLOOD PRESSURE: 96 MMHG | SYSTOLIC BLOOD PRESSURE: 155 MMHG | BODY MASS INDEX: 45.99 KG/M2 | WEIGHT: 293 LBS | OXYGEN SATURATION: 100 % | HEART RATE: 84 BPM

## 2022-04-13 LAB
A/G RATIO: 1 (ref 1.1–2.2)
ALBUMIN SERPL-MCNC: 3.2 G/DL (ref 3.4–5)
ALP BLD-CCNC: 165 U/L (ref 40–129)
ALT SERPL-CCNC: 24 U/L (ref 10–40)
ANION GAP SERPL CALCULATED.3IONS-SCNC: 16 MMOL/L (ref 3–16)
AST SERPL-CCNC: 21 U/L (ref 15–37)
ATYPICAL LYMPHOCYTE RELATIVE PERCENT: 2 % (ref 0–6)
BANDED NEUTROPHILS RELATIVE PERCENT: 1 % (ref 0–7)
BASOPHILS ABSOLUTE: 0.1 K/UL (ref 0–0.2)
BASOPHILS RELATIVE PERCENT: 1 %
BILIRUB SERPL-MCNC: 0.3 MG/DL (ref 0–1)
BLOOD CULTURE, ROUTINE: NORMAL
BUN BLDV-MCNC: 32 MG/DL (ref 7–20)
CALCIUM SERPL-MCNC: 9.6 MG/DL (ref 8.3–10.6)
CHLORIDE BLD-SCNC: 99 MMOL/L (ref 99–110)
CO2: 21 MMOL/L (ref 21–32)
CREAT SERPL-MCNC: 2.1 MG/DL (ref 0.6–1.1)
CULTURE, BLOOD 2: NORMAL
EOSINOPHILS ABSOLUTE: 0.1 K/UL (ref 0–0.6)
EOSINOPHILS RELATIVE PERCENT: 1 %
GFR AFRICAN AMERICAN: 31
GFR NON-AFRICAN AMERICAN: 26
GLUCOSE BLD-MCNC: 113 MG/DL (ref 70–99)
GLUCOSE BLD-MCNC: 116 MG/DL (ref 70–99)
GLUCOSE BLD-MCNC: 128 MG/DL (ref 70–99)
HCT VFR BLD CALC: 39.1 % (ref 36–48)
HEMOGLOBIN: 13.1 G/DL (ref 12–16)
LYMPHOCYTES ABSOLUTE: 2.7 K/UL (ref 1–5.1)
LYMPHOCYTES RELATIVE PERCENT: 19 %
MACROCYTES: ABNORMAL
MCH RBC QN AUTO: 34.5 PG (ref 26–34)
MCHC RBC AUTO-ENTMCNC: 33.6 G/DL (ref 31–36)
MCV RBC AUTO: 102.8 FL (ref 80–100)
METAMYELOCYTES RELATIVE PERCENT: 1 %
MONOCYTES ABSOLUTE: 0.9 K/UL (ref 0–1.3)
MONOCYTES RELATIVE PERCENT: 7 %
MYELOCYTE PERCENT: 1 %
NEUTROPHILS ABSOLUTE: 8.9 K/UL (ref 1.7–7.7)
NEUTROPHILS RELATIVE PERCENT: 67 %
PDW BLD-RTO: 14.5 % (ref 12.4–15.4)
PERFORMED ON: ABNORMAL
PERFORMED ON: ABNORMAL
PLATELET # BLD: 261 K/UL (ref 135–450)
PLATELET SLIDE REVIEW: ADEQUATE
PMV BLD AUTO: 9.1 FL (ref 5–10.5)
POTASSIUM SERPL-SCNC: 4.5 MMOL/L (ref 3.5–5.1)
RBC # BLD: 3.8 M/UL (ref 4–5.2)
SLIDE REVIEW: ABNORMAL
SODIUM BLD-SCNC: 136 MMOL/L (ref 136–145)
STOMATOCYTES: ABNORMAL
TOTAL PROTEIN: 6.5 G/DL (ref 6.4–8.2)
WBC # BLD: 12.7 K/UL (ref 4–11)

## 2022-04-13 PROCEDURE — 94761 N-INVAS EAR/PLS OXIMETRY MLT: CPT

## 2022-04-13 PROCEDURE — 6370000000 HC RX 637 (ALT 250 FOR IP): Performed by: UROLOGY

## 2022-04-13 PROCEDURE — 6370000000 HC RX 637 (ALT 250 FOR IP): Performed by: INTERNAL MEDICINE

## 2022-04-13 PROCEDURE — 94640 AIRWAY INHALATION TREATMENT: CPT

## 2022-04-13 PROCEDURE — 36415 COLL VENOUS BLD VENIPUNCTURE: CPT

## 2022-04-13 PROCEDURE — 85025 COMPLETE CBC W/AUTO DIFF WBC: CPT

## 2022-04-13 PROCEDURE — 80053 COMPREHEN METABOLIC PANEL: CPT

## 2022-04-13 PROCEDURE — 2580000003 HC RX 258: Performed by: UROLOGY

## 2022-04-13 PROCEDURE — 6360000002 HC RX W HCPCS: Performed by: UROLOGY

## 2022-04-13 RX ORDER — NIFEDIPINE 30 MG/1
60 TABLET, EXTENDED RELEASE ORAL 2 TIMES DAILY
Qty: 30 TABLET | Refills: 3 | Status: SHIPPED | OUTPATIENT
Start: 2022-04-13

## 2022-04-13 RX ORDER — METOPROLOL SUCCINATE 100 MG/1
100 TABLET, EXTENDED RELEASE ORAL DAILY
Qty: 30 TABLET | Refills: 3 | Status: SHIPPED | OUTPATIENT
Start: 2022-04-14

## 2022-04-13 RX ORDER — SODIUM BICARBONATE 650 MG/1
650 TABLET ORAL 3 TIMES DAILY
Qty: 21 TABLET | Refills: 0 | Status: SHIPPED | OUTPATIENT
Start: 2022-04-13

## 2022-04-13 RX ORDER — LEVOFLOXACIN 750 MG/1
750 TABLET ORAL
Qty: 5 TABLET | Refills: 0 | Status: SHIPPED | OUTPATIENT
Start: 2022-04-14 | End: 2022-04-18 | Stop reason: ALTCHOICE

## 2022-04-13 RX ADMIN — SODIUM BICARBONATE 650 MG: 650 TABLET ORAL at 09:01

## 2022-04-13 RX ADMIN — CLONIDINE HYDROCHLORIDE 0.2 MG: 0.1 TABLET ORAL at 09:01

## 2022-04-13 RX ADMIN — NIFEDIPINE 60 MG: 30 TABLET, FILM COATED, EXTENDED RELEASE ORAL at 09:01

## 2022-04-13 RX ADMIN — Medication 1 TABLET: at 09:01

## 2022-04-13 RX ADMIN — ALLOPURINOL 100 MG: 100 TABLET ORAL at 09:02

## 2022-04-13 RX ADMIN — SODIUM CHLORIDE, PRESERVATIVE FREE 10 ML: 5 INJECTION INTRAVENOUS at 09:02

## 2022-04-13 RX ADMIN — CYCLOBENZAPRINE 5 MG: 10 TABLET, FILM COATED ORAL at 09:01

## 2022-04-13 RX ADMIN — ENOXAPARIN SODIUM 30 MG: 100 INJECTION SUBCUTANEOUS at 09:02

## 2022-04-13 RX ADMIN — Medication 2 PUFF: at 08:40

## 2022-04-13 RX ADMIN — LEVOTHYROXINE SODIUM 175 MCG: 0.12 TABLET ORAL at 06:00

## 2022-04-13 RX ADMIN — METOPROLOL SUCCINATE 100 MG: 50 TABLET, EXTENDED RELEASE ORAL at 09:01

## 2022-04-13 ASSESSMENT — PAIN SCALES - GENERAL: PAINLEVEL_OUTOF10: 0

## 2022-04-13 NOTE — PLAN OF CARE
Problem: Falls - Risk of:  Goal: Will remain free from falls  Description: Will remain free from falls  4/13/2022 1214 by Key Amin RN  Outcome: Completed  4/13/2022 0923 by Key mAin RN  Outcome: Ongoing  Note: Pt free from falls this shift. Fall precautions in place at all times. Call light within reach. Pt able to and agreeable to contact for safety appropriately. 4/13/2022 0923 by Key Amin RN  Outcome: Ongoing  Note: Pt free from falls this shift. Fall precautions in place at all times. Call light within reach. Pt able to and agreeable to contact for safety appropriately. 4/12/2022 2311 by Todd Landau, RN  Outcome: Ongoing  Goal: Absence of physical injury  Description: Absence of physical injury  4/13/2022 1214 by Key Amin RN  Outcome: Completed  4/12/2022 2311 by Todd Landau, RN  Outcome: Ongoing     Problem: Pain:  Description: Pain management should include both nonpharmacologic and pharmacologic interventions. Goal: Pain level will decrease  Description: Pain level will decrease  4/13/2022 1214 by Key Amin RN  Outcome: Completed  4/13/2022 0923 by Key Amin RN  Outcome: Ongoing  Note: Pain/discomfort being managed with PRN analgesics per MD order.   Pt able to express presence and absence of pain and rate pain appropriately using numerical scale    4/12/2022 2311 by Todd Landau, RN  Outcome: Ongoing  Goal: Control of acute pain  Description: Control of acute pain  4/13/2022 1214 by Key Amin RN  Outcome: Completed  4/12/2022 2311 by Todd Landau, RN  Outcome: Ongoing  Goal: Control of chronic pain  Description: Control of chronic pain  4/13/2022 1214 by Key Amin RN  Outcome: Completed  4/12/2022 2311 by Todd Landau, RN  Outcome: Ongoing     Problem: Discharge Planning:  Goal: Discharged to appropriate level of care  Description: Discharged to appropriate level of care  4/13/2022 1214 by Key Amin RN  Outcome: Completed  4/13/2022 0923 by Reese Hernandez Saud David RN  Outcome: Ongoing  Note: Continuing to work with patient and health care team on discharge plan. Discharge instructions and medication management will be reviewed prior to discharge.     4/12/2022 2311 by Idalia Ramesh RN  Outcome: Ongoing     Problem: Gas Exchange - Impaired:  Goal: Levels of oxygenation will improve  Description: Levels of oxygenation will improve  4/13/2022 1214 by Nydia Rivas RN  Outcome: Completed  4/13/2022 0923 by Nydia Rivas RN  Outcome: Ongoing  4/12/2022 2311 by Idalia Ramesh RN  Outcome: Ongoing     Problem: Infection, Septic Shock:  Goal: Will show no infection signs and symptoms  Description: Will show no infection signs and symptoms  4/13/2022 1214 by Nydia Rivas RN  Outcome: Completed  4/13/2022 0923 by Nydia Rivas RN  Outcome: Ongoing  4/12/2022 2311 by Idalia Ramesh RN  Outcome: Ongoing     Problem: Infection - Ventilator-Associated Pneumonia:  Goal: Absence of pulmonary infection  Description: Absence of pulmonary infection  4/13/2022 1214 by Nydia Rivas RN  Outcome: Completed  4/12/2022 2311 by Idalia Ramesh RN  Outcome: Ongoing     Problem: Serum Glucose Level - Abnormal:  Goal: Ability to maintain appropriate glucose levels will improve  Description: Ability to maintain appropriate glucose levels will improve  4/13/2022 1214 by Nydia Rivas RN  Outcome: Completed  4/12/2022 2311 by Idalia Ramesh RN  Outcome: Ongoing     Problem: Tissue Perfusion, Altered:  Goal: Circulatory function within specified parameters  Description: Circulatory function within specified parameters  4/13/2022 1214 by Nydia Rivas RN  Outcome: Completed  4/12/2022 2311 by Idalia Ramesh RN  Outcome: Ongoing     Problem: Venous Thromboembolism:  Goal: Will show no signs or symptoms of venous thromboembolism  Description: Will show no signs or symptoms of venous thromboembolism  4/13/2022 1214 by Nydia Rivas RN  Outcome: Completed  4/12/2022 2311 by Idalia Ramesh RN  Outcome: Ongoing  Goal: Absence of signs or symptoms of impaired coagulation  Description: Absence of signs or symptoms of impaired coagulation  4/13/2022 1214 by Marie Gomez RN  Outcome: Completed  4/12/2022 2311 by Tena Gonzalez RN  Outcome: Ongoing     Problem: Activity:  Goal: Risk for activity intolerance will decrease  Description: Risk for activity intolerance will decrease  4/13/2022 1214 by Marie Gomez RN  Outcome: Completed  4/12/2022 2311 by Tena Gonzalez RN  Outcome: Ongoing     Problem:  Bowel/Gastric:  Goal: Bowel function will improve  Description: Bowel function will improve  4/13/2022 1214 by Marie Gomez RN  Outcome: Completed  4/12/2022 2311 by Tena Gonzalez RN  Outcome: Ongoing  Goal: Diagnostic test results will improve  Description: Diagnostic test results will improve  4/13/2022 1214 by Marie Gomez RN  Outcome: Completed  4/12/2022 2311 by Tena Gonzalez RN  Outcome: Ongoing  Goal: Occurrences of nausea will decrease  Description: Occurrences of nausea will decrease  4/13/2022 1214 by Marie Gomez RN  Outcome: Completed  4/12/2022 2311 by Tena Gonzalez RN  Outcome: Ongoing  Goal: Occurrences of vomiting will decrease  Description: Occurrences of vomiting will decrease  4/13/2022 1214 by Marie Gomez RN  Outcome: Completed  4/12/2022 2311 by Tena Gonzalez RN  Outcome: Ongoing     Problem: Fluid Volume:  Goal: Maintenance of adequate hydration will improve  Description: Maintenance of adequate hydration will improve  4/13/2022 1214 by Marie Gomez RN  Outcome: Completed  4/12/2022 2311 by Tena Gonzalez RN  Outcome: Ongoing     Problem: Health Behavior:  Goal: Ability to state signs and symptoms to report to health care provider will improve  Description: Ability to state signs and symptoms to report to health care provider will improve  4/13/2022 1214 by Marie Gomez RN  Outcome: Completed  4/12/2022 2311 by Tena Gonzalez RN  Outcome: Ongoing     Problem: Physical

## 2022-04-13 NOTE — PLAN OF CARE
Problem: Falls - Risk of:  Goal: Will remain free from falls  Description: Will remain free from falls  4/13/2022 0923 by Orlin Gordillo RN  Outcome: Ongoing  Note: Pt free from falls this shift. Fall precautions in place at all times. Call light within reach. Pt able to and agreeable to contact for safety appropriately. Problem: Falls - Risk of:  Goal: Will remain free from falls  Description: Will remain free from falls  4/13/2022 0923 by Orlin Gordillo RN  Outcome: Ongoing  Note: Pt free from falls this shift. Fall precautions in place at all times. Call light within reach. Pt able to and agreeable to contact for safety appropriately. Problem: Pain:  Description: Pain management should include both nonpharmacologic and pharmacologic interventions. Goal: Pain level will decrease  Description: Pain level will decrease  4/13/2022 0923 by Orlin Gordillo RN  Outcome: Ongoing  Note: Pain/discomfort being managed with PRN analgesics per MD order. Pt able to express presence and absence of pain and rate pain appropriately using numerical scale       Problem: Discharge Planning:  Goal: Discharged to appropriate level of care  Description: Discharged to appropriate level of care  4/13/2022 0923 by Orlin Gordillo RN  Outcome: Ongoing  Note: Continuing to work with patient and health care team on discharge plan. Discharge instructions and medication management will be reviewed prior to discharge.        Problem: Gas Exchange - Impaired:  Goal: Levels of oxygenation will improve  Description: Levels of oxygenation will improve  4/13/2022 0923 by Orlin Gordillo RN  Outcome: Ongoing  4/12/2022 2311 by Mendy Torres RN  Outcome: Ongoing     Problem: Gas Exchange - Impaired:  Goal: Levels of oxygenation will improve  Description: Levels of oxygenation will improve  4/13/2022 0923 by Orlin Gordillo RN  Outcome: Ongoing     Problem: Infection, Septic Shock:  Goal: Will show no infection signs and symptoms  Description: Will show no infection signs and symptoms  4/13/2022 0923 by Jack Martinez RN  Outcome: Ongoing     Problem: Gas Exchange - Impaired:  Intervention: Pulse oximetry  Note: Pt will maintain absence of respiratory complications. Oxygen saturations >90% at all times with supplemental O2 as needed. Will assess respiratory status every shift and PRN. Encourage to cough and deep breath. Encourage HHN as ordered. Monitor I/O. Maintain IVF's as ordered.

## 2022-04-13 NOTE — PLAN OF CARE
Problem: Falls - Risk of:  Goal: Will remain free from falls  Description: Will remain free from falls  4/12/2022 2311 by Kristie Hooker RN  Outcome: Ongoing  4/12/2022 0924 by Bob Yu RN  Outcome: Ongoing  Goal: Absence of physical injury  Description: Absence of physical injury  4/12/2022 2311 by Kristie Hooker RN  Outcome: Ongoing  4/12/2022 0924 by Bob Yu RN  Outcome: Ongoing   Pt free from falls this shift. Fall precautions in place at all times. Call light always within reach. Pt able and agreeable to contact for safety appropriately. Problem: Pain:  Description: Pain management should include both nonpharmacologic and pharmacologic interventions. Goal: Pain level will decrease  Description: Pain level will decrease  4/12/2022 2311 by Kristie Hooker RN  Outcome: Ongoing  4/12/2022 0924 by Bob Yu RN  Outcome: Ongoing  Goal: Control of acute pain  Description: Control of acute pain  4/12/2022 2311 by Kristie Hooker RN  Outcome: Ongoing  4/12/2022 0924 by Bob Yu RN  Outcome: Ongoing  Goal: Control of chronic pain  Description: Control of chronic pain  4/12/2022 2311 by Kristie Hooker RN  Outcome: Ongoing  4/12/2022 0924 by Bob Yu RN  Outcome: Ongoing   Pt able to express presence/absence of pain and rate pain appropriately using numerical scale. Pain/discomfort being managed with PRN analgesics per MD orders (see MAR). Pain assessed every shift and after interventions. Problem: Discharge Planning:  Goal: Discharged to appropriate level of care  Description: Discharged to appropriate level of care  4/12/2022 2311 by Kristie Hooker RN  Outcome: Ongoing  4/12/2022 0924 by Bob Yu RN  Outcome: Ongoing  Continuing to work with patient and health care team on discharge plan. Discharge instructions and medication management will be reviewed prior to discharge.    Problem: Sensory:  Goal: Pain level will decrease  Description: Pain level will decrease  4/12/2022 2311 by Corona Murcia RN  Outcome: Ongoing  4/12/2022 0924 by Jolie Starks RN  Outcome: Ongoing  Goal: Ability to identify factors that increase the pain will improve  Description: Ability to identify factors that increase the pain will improve  4/12/2022 2311 by Corona Murcia RN  Outcome: Ongoing  4/12/2022 0924 by Jolie Starks RN  Outcome: Ongoing  Goal: Ability to notify healthcare provider of pain before it becomes unmanageable or unbearable will improve  Description: Ability to notify healthcare provider of pain before it becomes unmanageable or unbearable will improve  4/12/2022 2311 by Corona Murcia RN  Outcome: Ongoing  4/12/2022 0924 by Jolie Starks RN  Outcome: Ongoing

## 2022-04-13 NOTE — DISCHARGE SUMMARY
Hospital Discharge Summary    Patient's PCP: [facility] Date: 4/9/2022   Discharge Date: 4/13/2022    Admitting Physician: Dr. Eric Kelsey MD  Discharge Physician: Dr. Blaise Llanes MD   Consults: nephrology and urology    Brief HPI:     42 y.o. female who presented to [facility] with worsening abdominal pain and dyspnea, patient was in ED 2 days ago diagnosed with UTI sent home with ciprofloxacin she was taking her ciprofloxacin on Thursday but she did not take it yesterday she did not feel any better, came back to emergency department, complaining of abdominal pain mainly lower abdominal pain 9 out of 10 intensity nonradiating not associated with any nausea or vomiting, no obvious trigger no obvious alleviating or aggravating factors, found to have urinary tract infection and hydronephrosis on imaging, patient will be admitted for further management and treatment. Patient was also complaining of fever and chills at home. To note that she did have UTI in the past    Brief hospital course:     1.  UTI, complicated, with obstructive uropathy, did receive p.o. antibiotics before admissions, and did have UTI in the past not in the last few months ago,  admitted on meropenem, urine culture positive for E. coli, sensitive to Levaquin,  changed to Levaquin renally dosed -  total of 2 weeks of antibiotics   2.   sepsis dt UTI -leucocytsis,sinus tach,lactate 2.4,-treated as above  3.  Obstructive uropathy,  status post cystoscopy and stent placement on the right side for 3mm stone with hydronephrosis. Needs to follow-up with urology in 2 weeks. 4.  Acute on chronic kidney injury likely aggravated by obstructive uropathy, urology consulted as above, consulted nephrology,  creatinine improving. 5.  Uncontrolled hypertension, adjusted medications multiple times with improvement--f/u with PCP  6.  Elevated lactic acid, improved with IV fluid  7.  Diabetes mellitus, sliding scale  8.  Anemia, chronic, follow-up as outpatient  9.  Morbid obesity, complicating current presentation and increasing morbidity, counseled for weight loss    Invasive procedures:  See above    Discharge Diagnoses:   Principal Problem:    UTI (urinary tract infection)  Active Problems:    MIN (acute kidney injury) ([address]    Morbid (severe) obesity due to excess calories (HCC)    Anemia    Type 2 diabetes mellitus (HCC)    Obstructed, uropathy    Elevated lactic acid level    Complicated UTI (urinary tract infection)    CKD (chronic kidney disease)  Resolved Problems:    * No resolved hospital problems. *      Physical Exam: BP (!) 155/96   Pulse 84   Temp 97.9 °F (36.6 °C) (Oral)   Resp 20   Ht 5' 7\" (1.702 m)   Wt (!) 302 lb 12.8 oz (137.3 kg)   LMP 03/09/2022   SpO2 100%   BMI 47.43 kg/m²   Gen/overall appearance: Not in acute distress. Alert. Head: Normocephalic, atraumatic  Eyes: EOMI, good acuity  ENT:- Oral mucosa moist  Neck: No JVD, thyromegaly  CVS: Nml S1S2, no MRG, RRR  Pulm: Clear bilaterally. No crackles/wheezes  Gastrointestinal: Soft, NT/ND, +BS  Musculoskeletal: No edema. Warm  Neuro: No focal deficit. Moves extremity spontaneously. Psychiatry: Appropriate affect. Not agitated. Skin: Warm, dry with normal turgor. No rash        Significant Diagnostic Studies:    See above        Treatments: As above.       Discharge Medications:     Medication List      START taking these medications    levoFLOXacin 750 MG tablet  Commonly known as: Levaquin  Take 1 tablet by mouth every 48 hours for 10 days  Start taking on: April 14, 2022     NIFEdipine 30 MG extended release tablet  Commonly known as: PROCARDIA XL  Take 2 tablets by mouth in the morning and at bedtime     sodium bicarbonate 650 MG tablet  Take 1 tablet by mouth 3 times daily        CHANGE how you take these medications    metoprolol succinate 100 MG extended release tablet  Commonly known as: TOPROL XL  Take 1 tablet by mouth daily  Start taking on: April 14, 2022  What changed:   · medication strength  · how much to take        CONTINUE taking these medications    albuterol sulfate  (90 Base) MCG/ACT inhaler     allopurinol 100 MG tablet  Commonly known as: ZYLOPRIM     atorvastatin 40 MG tablet  Commonly known as: LIPITOR     cloNIDine 0.2 MG tablet  Commonly known as: CATAPRES     folic acid-pyridoxine-cyanocobalamine 2.5-25-1 MG Tabs tablet  Commonly known as: FOLTX  Take 1 tablet by mouth daily     levothyroxine 175 MCG tablet  Commonly known as: SYNTHROID     pioglitazone 15 MG tablet  Commonly known as: ACTOS     Symbicort 160-4.5 MCG/ACT Aero  Generic drug: budesonide-formoterol     vitamin D3 125 MCG (5000 UT) Tabs tablet  Commonly known as: CHOLECALCIFEROL        STOP taking these medications    amLODIPine 5 MG tablet  Commonly known as: NORVASC     ciprofloxacin 500 MG tablet  Commonly known as: CIPRO     cyclobenzaprine 5 MG tablet  Commonly known as: FLEXERIL     ibuprofen 800 MG tablet  Commonly known as: ADVIL;MOTRIN     Phospha 250 Neutral 155-852-130 MG Tabs     potassium chloride 20 MEQ extended release tablet  Commonly known as: KLOR-CON M           Where to Get Your Medications      These medications were sent to [address] [place] [address], [facility] [phone]  [address] [address]    Phone: [phone]   · levoFLOXacin 750 MG tablet  · metoprolol succinate 100 MG extended release tablet  · NIFEdipine 30 MG extended release tablet  · sodium bicarbonate 650 MG tablet         Activity: activity as tolerated  Diet: ADULT DIET;  Regular; 4 carb choices (60 gm/meal)      Disposition: home  Discharged Condition: Stable  Follow Up:   [address] [place] [address]    Schedule an appointment as soon as possible for a visit      Sherren Leander, MD [address] [place] [address]  [phone]    Schedule an appointment as soon as possible for a visit in  weeks  MIN/CKD,HTN    Nohemy Juárez, [address]  [address]    Schedule an appointment as soon as possible for a visit in 2 weeks  kidney stone        Code status:  Full Code         Total time spent on discharge, finalizing medications, referrals and arranging outpatient follow up was more than 45 minutes      Thank you Dr. Madonna Evans for the opportunity to be involved in this patients care.

## 2022-04-13 NOTE — PROGRESS NOTES
[facility]                                               Progress note    Summary:   Leeanne Branch is being seen by nephrology for MIN. Admitted with UTI. Diagnosed with obstructive uropathy due to right nephrolithiasis. S/p right ureteral stent 4/10/22. Interval History  Seen at bedside. Up to the chair. No complaints, feeling much better, asking to go home. PO intake is good, no edema no NVD. Says she is passing urine without difficulty     Blood pressure 155/96  100% on room air      Labs reviewed. Cr 2.3 > 2.1    Plan:   -continue nifedipine 60 mg twice daily and clonidine 0.2 mg BID. - cont sodium bicarb 650 mg TID at dc  - Cr coming down. Can f/u 2-4 weeks with repeat labs outpatient. Edwardo Wilkes MD  [facility]  Office: [phone]    Assessment:   Acute Kidney Injury  MIN likely due to - MIN likely due to urinary tract infection, sepsis, hydronephrosis  Cr on consultation-3.9, was  Baseline Cr-not available-last creatinine was 1.6-EGFR of 35 mm/min on 8/25/2020  She may have CKD stage IIIb from diabetes and hypertension      UA-moderate blood, moderate LE, more than 300 protein-currently on IV antibiotics  Renal Imaging:-4/22-right UVJ calculus with moderate hydronephrosis  Echo: Normal 1/2008-no recent 1 in the system    Hypertension   BP: (125-216)/()  Pulse:  []   BP goal inpatient 148-896 systolic inpatient  BP was as low as 712 systolic on arrival-medicine has to be adjusted      Renal stones  Has 5 mm calculus on the right UVJ  Will need work-up as an outpatient for the cause of nephrolithiasis    Proteinuria  Protein in UA more than 300  We will need protein creatinine ratio once urinary tract infection is controlled        Elevated lactic acid  Diabetes mellitus  Anemia    Urinary tract infection      ROS:   Positives Listed Bold. All other remaining systems are negative.     Constitutional:  fever, chills, weakness, weight change, fatigue, Skin:  rash, pruritus, hair loss, bruising, dry skin, petechiae. Head, Face, Neck   headaches, swelling,  cervical adenopathy. Respiratory: shortness of breath, cough, or wheezing  Cardiovascular: chest pain, palpitations, dizzy, edema  Gastrointestinal: nausea, vomiting, diarrhea, constipation,belly pain    Yellow skin, blood in stool  Musculoskeletal:  back pain, muscle weakness, gait problems,       joint pain or swelling. Genitourinary:  dysuria, poor urine flow, flank pain, blood in urine  Neurologic:  vertigo, TIA'S, syncope, seizures, focal weakness  Psychosocial:  insomnia, anxiety, or depression. Additional positive findings: -     PMH:   Past medical history, surgical history, social history, family history are reviewed and updated as appropriate. Reviewed current medication list.   Allergies reviewed and updated as needed. PE:   Vitals:    04/13/22 1102   BP: (!) 155/96   Pulse: 84   Resp: 20   Temp: 97.9 °F (36.6 °C)   SpO2: 100%       General appearance:  in NAD, fully alert and oriented. Comfortable. HEENT: EOM intact, no icterus. Trachea is midline. Neck : No masses, appears symmetrical, no JVD  Respiratory: Respiratory effort appears normal, bilateral equal chest rise, no wheeze, no crackles   Cardiovascular: Ausculation shows RRR no edema  Abdomen: No visible mass or tenderness, non distended. Musculoskeletal:  Joints with no swelling or deformity. Skin:no rashes, ulcers, induration, no jaundice. Neuro: face symmetric, no focal deficits. Appropriate responses.        Lab Results   Component Value Date    CREATININE 2.1 (H) 04/13/2022    BUN 32 (H) 04/13/2022     04/13/2022    K 4.5 04/13/2022    CL 99 04/13/2022    CO2 21 04/13/2022      Lab Results   Component Value Date    WBC 12.7 (H) 04/13/2022    HGB 13.1 04/13/2022    HCT 39.1 04/13/2022    .8 (H) 04/13/2022     04/13/2022     Lab Results   Component Value Date    CALCIUM 9.6 04/13/2022    PHOS 2.1 (L) 08/25/2020

## 2022-04-13 NOTE — CARE COORDINATION
DISCHARGE SUMMARY     DATE OF DISCHARGE: 4/13/22    DISCHARGE DESTINATION: Home    HOME CARE: No    HEMODIALYSIS: No    TRANSPORTATION: Private Car    NEW DME ORDERED: no    COMMENTS: Denies discharge needs.   Electronically signed by Bob Walker RN on 4/13/2022 at 1:05 PM

## 2022-04-18 NOTE — PROGRESS NOTES
[facility] [facility] time_____1400_______        Surgery time_____1530_______    Take the following medications with a sip of water: Follow your MD/Surgeons pre-procedure instructions regarding your medications     Do not eat or drink anything after 12:00 midnight prior to your surgery. This includes water chewing gum, mints and ice chips. You may brush your teeth and gargle the morning of your surgery, but do not swallow the water     Please see your family doctor/pediatrician for a history and physical and/or concerning medications. Bring any test results/reports from your physicians office. If you are under the care of a heart doctor or specialist doctor, please be aware that you may be asked to them for clearance    You may be asked to stop blood thinners such as Coumadin, Plavix, Fragmin, Lovenox, etc., or any anti-inflammatories such as:  Aspirin, Ibuprofen, Advil, Naproxen prior to your surgery. We also ask that you stop any OTC medications such as fish oil, vitamin E, glucosamine, garlic, Multivitamins, COQ 10, etc.    We ask that you do not smoke 24 hours prior to surgery  We ask that you do not  drink any alcoholic beverages 24 hours prior to surgery     You must make arrangements for a responsible adult to take you home after your surgery. For your safety you will not be allowed to leave alone or drive yourself home. Your surgery will be cancelled if you do not have a ride home. Also for your safety, it is strongly suggested that someone stay with you the first 24 hours after your surgery. A parent or legal guardian must accompany a child scheduled for surgery and plan to stay at the hospital until the child is discharged. Please do not bring other children with you. For your comfort, please wear simple loose fitting clothing to the hospital.  Please do not bring valuables.     Do not wear any make-up or nail polish on your fingers or toes      For your safety, please do not wear any jewelry or body piercing's on the day of surgery. All jewelry must be removed. If you have dentures, they will be removed before going to operating room. For your convenience, we will provide you with a container. If you wear contact lenses or glasses, they will be removed, please bring a case for them. If you have a living will and a durable power of  for healthcare, please bring in a copy. As part of our patient safety program to minimize surgical site infections, we ask you to do the following:    · Please notify your surgeon if you develop any illness between         now and the  day of your surgery. · This includes a cough, cold, fever, sore throat, nausea,         or vomiting, and diarrhea, etc.  ·  Please notify your surgeon if you experience dizziness, shortness         of breath or blurred vision between now and the time of your surgery. Do not shave your operative site 96 hours prior to surgery. For face and neck surgery, men may use an electric razor 48 hours   prior to surgery. You may shower the night before surgery or the morning of   your surgery with an antibacterial soap. You will need to bring a photo ID and insurance card    [facility] has an onsite pharmacy, would you like to utilize our pharmacy     If you will be staying overnight and use a C-pap machine, please bring   your C-pap to hospital     Our goal is to provide you with excellent care, therefore, visitors will be limited to two(2) in the room at a time so that we may focus on providing this care for you. Please contact pre-admission testing if you have any further questions. [facility] phone number:  [phone] PAT fax number:  [phone]  Please note these are generalized instructions for all surgical cases, you may be provided with more specific instructions according to your surgery.     C-Difficile admission screening and protocol:       * Admitted with diarrhea? [] YES    [x]  NO     *Prior history of C-Diff. In last 3 months? [] YES    [x]  NO     *Antibiotic use in the past 6-8 weeks? []  NO    [x]  YES                 If yes, which ANTIBIOTIC AND REASON__kidney stones/UTI____     *Prior hospitalization or nursing home in the last month? []  YES    [x]  NO        SAFETY FIRST. .call before you fall

## 2022-04-22 ENCOUNTER — ANESTHESIA EVENT (OUTPATIENT)
Dept: OPERATING ROOM | Age: 47
End: 2022-04-22
Payer: COMMERCIAL

## 2022-04-25 ENCOUNTER — HOSPITAL ENCOUNTER (OUTPATIENT)
Age: 47
Setting detail: OUTPATIENT SURGERY
Discharge: HOME | End: 2022-04-25
Attending: UROLOGY | Admitting: UROLOGY
Payer: COMMERCIAL

## 2022-04-25 ENCOUNTER — ANESTHESIA (OUTPATIENT)
Dept: OPERATING ROOM | Age: 47
End: 2022-04-25
Payer: COMMERCIAL

## 2022-04-25 VITALS
RESPIRATION RATE: 20 BRPM | BODY MASS INDEX: 45.99 KG/M2 | TEMPERATURE: 98.1 F | HEART RATE: 78 BPM | SYSTOLIC BLOOD PRESSURE: 115 MMHG | HEIGHT: 67 IN | OXYGEN SATURATION: 94 % | DIASTOLIC BLOOD PRESSURE: 77 MMHG | WEIGHT: 293 LBS

## 2022-04-25 VITALS
TEMPERATURE: 96.8 F | DIASTOLIC BLOOD PRESSURE: 71 MMHG | RESPIRATION RATE: 4 BRPM | SYSTOLIC BLOOD PRESSURE: 114 MMHG | OXYGEN SATURATION: 98 %

## 2022-04-25 LAB
GLUCOSE BLD-MCNC: 121 MG/DL (ref 70–99)
GLUCOSE BLD-MCNC: 90 MG/DL (ref 70–99)
PERFORMED ON: ABNORMAL
PERFORMED ON: NORMAL
PREGNANCY, URINE: NEGATIVE

## 2022-04-25 PROCEDURE — 2500000003 HC RX 250 WO HCPCS: Performed by: NURSE ANESTHETIST, CERTIFIED REGISTERED

## 2022-04-25 PROCEDURE — 6360000002 HC RX W HCPCS: Performed by: ANESTHESIOLOGY

## 2022-04-25 PROCEDURE — 3600000002 HC SURGERY LEVEL 2 BASE: Performed by: UROLOGY

## 2022-04-25 PROCEDURE — 7100000000 HC PACU RECOVERY - FIRST 15 MIN: Performed by: UROLOGY

## 2022-04-25 PROCEDURE — 3600000012 HC SURGERY LEVEL 2 ADDTL 15MIN: Performed by: UROLOGY

## 2022-04-25 PROCEDURE — 2580000003 HC RX 258: Performed by: ANESTHESIOLOGY

## 2022-04-25 PROCEDURE — 84703 CHORIONIC GONADOTROPIN ASSAY: CPT

## 2022-04-25 PROCEDURE — 7100000001 HC PACU RECOVERY - ADDTL 15 MIN: Performed by: UROLOGY

## 2022-04-25 PROCEDURE — 2580000003 HC RX 258: Performed by: UROLOGY

## 2022-04-25 PROCEDURE — 3700000000 HC ANESTHESIA ATTENDED CARE: Performed by: UROLOGY

## 2022-04-25 PROCEDURE — 3700000001 HC ADD 15 MINUTES (ANESTHESIA): Performed by: UROLOGY

## 2022-04-25 PROCEDURE — 6370000000 HC RX 637 (ALT 250 FOR IP): Performed by: ANESTHESIOLOGY

## 2022-04-25 PROCEDURE — 7100000011 HC PHASE II RECOVERY - ADDTL 15 MIN: Performed by: UROLOGY

## 2022-04-25 PROCEDURE — C1769 GUIDE WIRE: HCPCS | Performed by: UROLOGY

## 2022-04-25 PROCEDURE — 94761 N-INVAS EAR/PLS OXIMETRY MLT: CPT

## 2022-04-25 PROCEDURE — 2709999900 HC NON-CHARGEABLE SUPPLY: Performed by: UROLOGY

## 2022-04-25 PROCEDURE — 6360000002 HC RX W HCPCS: Performed by: NURSE ANESTHETIST, CERTIFIED REGISTERED

## 2022-04-25 PROCEDURE — 6360000002 HC RX W HCPCS: Performed by: UROLOGY

## 2022-04-25 PROCEDURE — 6370000000 HC RX 637 (ALT 250 FOR IP): Performed by: NURSE ANESTHETIST, CERTIFIED REGISTERED

## 2022-04-25 PROCEDURE — 7100000010 HC PHASE II RECOVERY - FIRST 15 MIN: Performed by: UROLOGY

## 2022-04-25 PROCEDURE — 94640 AIRWAY INHALATION TREATMENT: CPT

## 2022-04-25 RX ORDER — SODIUM CHLORIDE 0.9 % (FLUSH) 0.9 %
5-40 SYRINGE (ML) INJECTION PRN
Status: DISCONTINUED | OUTPATIENT
Start: 2022-04-25 | End: 2022-04-25 | Stop reason: HOSPADM

## 2022-04-25 RX ORDER — FENTANYL CITRATE 50 UG/ML
INJECTION, SOLUTION INTRAMUSCULAR; INTRAVENOUS PRN
Status: DISCONTINUED | OUTPATIENT
Start: 2022-04-25 | End: 2022-04-25 | Stop reason: SDUPTHER

## 2022-04-25 RX ORDER — SODIUM CHLORIDE 9 MG/ML
INJECTION, SOLUTION INTRAVENOUS PRN
Status: DISCONTINUED | OUTPATIENT
Start: 2022-04-25 | End: 2022-04-25 | Stop reason: HOSPADM

## 2022-04-25 RX ORDER — DEXAMETHASONE SODIUM PHOSPHATE 4 MG/ML
INJECTION, SOLUTION INTRA-ARTICULAR; INTRALESIONAL; INTRAMUSCULAR; INTRAVENOUS; SOFT TISSUE PRN
Status: DISCONTINUED | OUTPATIENT
Start: 2022-04-25 | End: 2022-04-25 | Stop reason: SDUPTHER

## 2022-04-25 RX ORDER — ONDANSETRON 2 MG/ML
INJECTION INTRAMUSCULAR; INTRAVENOUS PRN
Status: DISCONTINUED | OUTPATIENT
Start: 2022-04-25 | End: 2022-04-25 | Stop reason: SDUPTHER

## 2022-04-25 RX ORDER — DROPERIDOL 2.5 MG/ML
0.62 INJECTION, SOLUTION INTRAMUSCULAR; INTRAVENOUS
Status: DISCONTINUED | OUTPATIENT
Start: 2022-04-25 | End: 2022-04-25 | Stop reason: HOSPADM

## 2022-04-25 RX ORDER — ALBUTEROL SULFATE 90 UG/1
AEROSOL, METERED RESPIRATORY (INHALATION) PRN
Status: DISCONTINUED | OUTPATIENT
Start: 2022-04-25 | End: 2022-04-25 | Stop reason: SDUPTHER

## 2022-04-25 RX ORDER — FENTANYL CITRATE 50 UG/ML
50 INJECTION, SOLUTION INTRAMUSCULAR; INTRAVENOUS EVERY 5 MIN PRN
Status: DISCONTINUED | OUTPATIENT
Start: 2022-04-25 | End: 2022-04-25 | Stop reason: HOSPADM

## 2022-04-25 RX ORDER — IPRATROPIUM BROMIDE AND ALBUTEROL SULFATE 2.5; .5 MG/3ML; MG/3ML
1 SOLUTION RESPIRATORY (INHALATION) ONCE
Status: COMPLETED | OUTPATIENT
Start: 2022-04-25 | End: 2022-04-25

## 2022-04-25 RX ORDER — SUCCINYLCHOLINE/SOD CL,ISO/PF 200MG/10ML
SYRINGE (ML) INTRAVENOUS PRN
Status: DISCONTINUED | OUTPATIENT
Start: 2022-04-25 | End: 2022-04-25 | Stop reason: SDUPTHER

## 2022-04-25 RX ORDER — CIPROFLOXACIN 2 MG/ML
400 INJECTION, SOLUTION INTRAVENOUS ONCE
Status: COMPLETED | OUTPATIENT
Start: 2022-04-25 | End: 2022-04-25

## 2022-04-25 RX ORDER — GLYCOPYRROLATE 0.2 MG/ML
INJECTION INTRAMUSCULAR; INTRAVENOUS PRN
Status: DISCONTINUED | OUTPATIENT
Start: 2022-04-25 | End: 2022-04-25 | Stop reason: SDUPTHER

## 2022-04-25 RX ORDER — MIDAZOLAM HYDROCHLORIDE 1 MG/ML
INJECTION INTRAMUSCULAR; INTRAVENOUS PRN
Status: DISCONTINUED | OUTPATIENT
Start: 2022-04-25 | End: 2022-04-25 | Stop reason: SDUPTHER

## 2022-04-25 RX ORDER — ONDANSETRON 2 MG/ML
4 INJECTION INTRAMUSCULAR; INTRAVENOUS
Status: COMPLETED | OUTPATIENT
Start: 2022-04-25 | End: 2022-04-25

## 2022-04-25 RX ORDER — LIDOCAINE HYDROCHLORIDE 20 MG/ML
INJECTION, SOLUTION EPIDURAL; INFILTRATION; INTRACAUDAL; PERINEURAL PRN
Status: DISCONTINUED | OUTPATIENT
Start: 2022-04-25 | End: 2022-04-25 | Stop reason: SDUPTHER

## 2022-04-25 RX ORDER — FENTANYL CITRATE 50 UG/ML
25 INJECTION, SOLUTION INTRAMUSCULAR; INTRAVENOUS EVERY 5 MIN PRN
Status: DISCONTINUED | OUTPATIENT
Start: 2022-04-25 | End: 2022-04-25 | Stop reason: HOSPADM

## 2022-04-25 RX ORDER — MAGNESIUM HYDROXIDE 1200 MG/15ML
LIQUID ORAL
Status: COMPLETED | OUTPATIENT
Start: 2022-04-25 | End: 2022-04-25

## 2022-04-25 RX ORDER — SODIUM CHLORIDE 0.9 % (FLUSH) 0.9 %
5-40 SYRINGE (ML) INJECTION EVERY 12 HOURS SCHEDULED
Status: DISCONTINUED | OUTPATIENT
Start: 2022-04-25 | End: 2022-04-25 | Stop reason: HOSPADM

## 2022-04-25 RX ORDER — PROPOFOL 10 MG/ML
INJECTION, EMULSION INTRAVENOUS PRN
Status: DISCONTINUED | OUTPATIENT
Start: 2022-04-25 | End: 2022-04-25 | Stop reason: SDUPTHER

## 2022-04-25 RX ORDER — SODIUM CHLORIDE 9 MG/ML
INJECTION, SOLUTION INTRAVENOUS CONTINUOUS
Status: DISCONTINUED | OUTPATIENT
Start: 2022-04-25 | End: 2022-04-25 | Stop reason: HOSPADM

## 2022-04-25 RX ADMIN — SODIUM CHLORIDE: 9 INJECTION, SOLUTION INTRAVENOUS at 15:55

## 2022-04-25 RX ADMIN — FENTANYL CITRATE 50 MCG: 50 INJECTION INTRAMUSCULAR; INTRAVENOUS at 15:57

## 2022-04-25 RX ADMIN — FENTANYL CITRATE 50 MCG: 50 INJECTION INTRAMUSCULAR; INTRAVENOUS at 16:11

## 2022-04-25 RX ADMIN — LIDOCAINE HYDROCHLORIDE 100 MG: 20 INJECTION, SOLUTION EPIDURAL; INFILTRATION; INTRACAUDAL; PERINEURAL at 16:01

## 2022-04-25 RX ADMIN — Medication 180 MG: at 16:01

## 2022-04-25 RX ADMIN — PROPOFOL 250 MG: 10 INJECTION, EMULSION INTRAVENOUS at 16:01

## 2022-04-25 RX ADMIN — GLYCOPYRROLATE 0.2 MG: 0.2 INJECTION, SOLUTION INTRAMUSCULAR; INTRAVENOUS at 16:08

## 2022-04-25 RX ADMIN — ONDANSETRON 4 MG: 2 INJECTION INTRAMUSCULAR; INTRAVENOUS at 16:09

## 2022-04-25 RX ADMIN — DEXAMETHASONE SODIUM PHOSPHATE 8 MG: 4 INJECTION, SOLUTION INTRAMUSCULAR; INTRAVENOUS at 16:08

## 2022-04-25 RX ADMIN — MIDAZOLAM 2 MG: 1 INJECTION INTRAMUSCULAR; INTRAVENOUS at 15:55

## 2022-04-25 RX ADMIN — CIPROFLOXACIN 400 MG: 2 INJECTION, SOLUTION INTRAVENOUS at 15:55

## 2022-04-25 RX ADMIN — ONDANSETRON 4 MG: 2 INJECTION INTRAMUSCULAR; INTRAVENOUS at 16:32

## 2022-04-25 RX ADMIN — IPRATROPIUM BROMIDE AND ALBUTEROL SULFATE 1 AMPULE: 2.5; .5 SOLUTION RESPIRATORY (INHALATION) at 16:41

## 2022-04-25 RX ADMIN — ALBUTEROL SULFATE 4 PUFF: 90 AEROSOL, METERED RESPIRATORY (INHALATION) at 16:03

## 2022-04-25 ASSESSMENT — PULMONARY FUNCTION TESTS
PIF_VALUE: 27
PIF_VALUE: 29
PIF_VALUE: 31
PIF_VALUE: 4
PIF_VALUE: 30
PIF_VALUE: 0
PIF_VALUE: 28
PIF_VALUE: 24
PIF_VALUE: 29
PIF_VALUE: 1
PIF_VALUE: 2
PIF_VALUE: 2
PIF_VALUE: 29
PIF_VALUE: 28
PIF_VALUE: 5
PIF_VALUE: 2
PIF_VALUE: 28
PIF_VALUE: 29
PIF_VALUE: 28
PIF_VALUE: 4
PIF_VALUE: 0
PIF_VALUE: 29
PIF_VALUE: 29
PIF_VALUE: 0
PIF_VALUE: 28
PIF_VALUE: 3
PIF_VALUE: 1

## 2022-04-25 ASSESSMENT — PAIN - FUNCTIONAL ASSESSMENT: PAIN_FUNCTIONAL_ASSESSMENT: 0-10

## 2022-04-25 ASSESSMENT — ENCOUNTER SYMPTOMS: SHORTNESS OF BREATH: 0

## 2022-04-25 NOTE — ANESTHESIA PRE PROCEDURE
Department of Anesthesiology  Preprocedure Note       Name:  Gary Bedoya   Age:  42 y.o.  :  1975                                          MRN:  [id number]         Date:  2022      Surgeon: Rukhsana Martinez):  Thea Cordero MD    Procedure: Procedure(s):  CYSTOSCOPY RIGHT URETEROSCOPY HOLMIUM LASER STONE MANIPULATION  RIGHT STENT REMOVAL    Medications prior to admission:   Prior to Admission medications    Medication Sig Start Date End Date Taking?  Authorizing Provider   metoprolol succinate (TOPROL XL) 100 MG extended release tablet Take 1 tablet by mouth daily 22   Helder Santoro MD   NIFEdipine (PROCARDIA XL) 30 MG extended release tablet Take 2 tablets by mouth in the morning and at bedtime 22   Helder Santoro MD   sodium bicarbonate 650 MG tablet Take 1 tablet by mouth 3 times daily 22   Helder Santoro MD   allopurinol (ZYLOPRIM) 100 MG tablet Take 100 mg by mouth daily 3/17/22   Historical Provider, MD   vitamin D3 (CHOLECALCIFEROL) 125 MCG (5000 UT) TABS tablet Take 1 tablet by mouth daily    Historical Provider, MD   albuterol sulfate  (90 Base) MCG/ACT inhaler Inhale 1 puff into the lungs every 4-6 hours as needed SOB    Historical Provider, MD   atorvastatin (LIPITOR) 40 MG tablet Take 40 mg by mouth daily    Historical Provider, MD   pioglitazone (ACTOS) 15 MG tablet Take 15 mg by mouth daily 3/17/22   Historical Provider, MD   folic acid-pyridoxine-cyanocobalamine (FOLTX) 2.5-25-1 MG TABS tablet Take 1 tablet by mouth daily 20   Lucian Cavanaugh MD   cloNIDine (CATAPRES) 0.2 MG tablet Take 0.2 mg by mouth 2 times daily    Historical Provider, MD   levothyroxine (SYNTHROID) 175 MCG tablet Take 175 mcg by mouth daily 14   Historical Provider, MD   budesonide-formoterol (SYMBICORT) 160-4.5 MCG/ACT AERO Inhale 2 puffs into the lungs 2 times daily 13   Historical Provider, MD       Current medications:    Current Facility-Administered Medications   Medication Dose Route Frequency Provider Last Rate Last Admin    ciprofloxacin (CIPRO) IVPB 400 mg  400 mg IntraVENous Once Emma Kimball MD        0.9 % sodium chloride infusion   IntraVENous Continuous Cindy Spaulding MD        sodium chloride flush 0.9 % injection 5-40 mL  5-40 mL IntraVENous 2 times per day Cindy Spaulding MD        sodium chloride flush 0.9 % injection 5-40 mL  5-40 mL IntraVENous PRN Cindy Spaulding MD        0.9 % sodium chloride infusion   IntraVENous PRN Cindy Spaulding MD           Allergies:     Allergies   Allergen Reactions    Penicillins Hives       Problem List:    Patient Active Problem List   Diagnosis Code    MIN (acute kidney injury) ([facility] N17.9    Headache R51.9    Hypokalemia E87.6    Hyponatremia E87.1    Morbid (severe) obesity due to excess calories (HCC) E66.01    Anemia D64.9    Type 2 diabetes mellitus ([facility] E11.9    Postoperative hypothyroidism E89.0    UTI (urinary tract infection) N39.0    Obstructed, uropathy N13.9    Elevated lactic acid level Q38.18    Complicated UTI (urinary tract infection) N39.0    CKD (chronic kidney disease) N18.9       Past Medical History:        Diagnosis Date    Asthma     Diabetes mellitus ([facility]     Hyperlipidemia     Hypertension     Kidney stone 2022    Thyroid disease     thyroid removed d/t hyperthyroidism       Past Surgical History:        Procedure Laterality Date    APPENDECTOMY      BLADDER SURGERY Right 4/10/2022    CYSTOSCOPY  RIGHT RETROGRADE PYELOGRAM STENT INSERTION performed by Emma Kimball MD at [address] [place] [address] History:    Social History     Tobacco Use    Smoking status: Current Every Day Smoker     Packs/day: 0.25     Years: 3.00     Pack years: 0.75     Types: Cigarettes    Smokeless tobacco: Never Used   Substance Use Topics    Alcohol use: Yes     Comment: social                                Ready to quit: Not Answered  Counseling given: Not Answered      Vital Signs (Current):   Vitals:    04/18/22 1105 04/25/22 1407 04/25/22 1412   BP:  (!) 194/114 (!) 143/103   Pulse:  75    Resp:  14    Temp:  97.8 °F (36.6 °C)    TempSrc:  Temporal    SpO2:  100%    Weight: 290 lb (131.5 kg) 294 lb 15.6 oz (133.8 kg)    Height: 5' 7\" (1.702 m)                                                BP Readings from Last 3 Encounters:   04/25/22 (!) 143/103   04/13/22 (!) 155/96   04/10/22 139/72       NPO Status: Time of last liquid consumption: 2300                        Time of last solid consumption: 2300                        Date of last liquid consumption: 04/24/22                        Date of last solid food consumption: 04/24/22    BMI:   Wt Readings from Last 3 Encounters:   04/25/22 294 lb 15.6 oz (133.8 kg)   04/13/22 (!) 302 lb 12.8 oz (137.3 kg)   04/07/22 (!) 302 lb 4 oz (137.1 kg)     Body mass index is 46.2 kg/m². CBC:   Lab Results   Component Value Date    WBC 12.7 04/13/2022    RBC 3.80 04/13/2022    HGB 13.1 04/13/2022    HCT 39.1 04/13/2022    .8 04/13/2022    RDW 14.5 04/13/2022     04/13/2022       CMP:   Lab Results   Component Value Date     04/13/2022    K 4.5 04/13/2022    K 3.8 04/10/2022    CL 99 04/13/2022    CO2 21 04/13/2022    BUN 32 04/13/2022    CREATININE 2.1 04/13/2022    GFRAA 31 04/13/2022    AGRATIO 1.0 04/13/2022    LABGLOM 26 04/13/2022    GLUCOSE 128 04/13/2022    PROT 6.5 04/13/2022    CALCIUM 9.6 04/13/2022    BILITOT 0.3 04/13/2022    ALKPHOS 165 04/13/2022    AST 21 04/13/2022    ALT 24 04/13/2022       POC Tests: No results for input(s): POCGLU, POCNA, POCK, POCCL, POCBUN, POCHEMO, POCHCT in the last 72 hours.     Coags: No results found for: PROTIME, INR, APTT    HCG (If Applicable):   Lab Results   Component Value Date    PREGTESTUR Negative 04/07/2022        ABGs: No results found for: PHART, PO2ART, VGG9SPN, JZV2VIV, BEART, S8LUTLPQ     Type & Screen (If Applicable):  No results found for: LABABO, LABRH    Drug/Infectious Status (If Applicable):  No results found for: HIV, HEPCAB    COVID-19 Screening (If Applicable):   Lab Results   Component Value Date    COVID19 Not Detected 08/23/2020           Anesthesia Evaluation  Patient summary reviewed no history of anesthetic complications:   Airway: Mallampati: II  TM distance: >3 FB   Neck ROM: full  Mouth opening: > = 3 FB Dental: normal exam         Pulmonary: breath sounds clear to auscultation  (+) asthma (on symbicort and albuterol):     (-) shortness of breath and recent URI                           Cardiovascular:  Exercise tolerance: good (>4 METS),   (+) hypertension (clonidine, nifedipine):, hyperlipidemia    (-) past MI, CAD,  angina and  [place]      Rhythm: regular  Rate: normal           Beta Blocker:  Dose within 24 Hrs         Neuro/Psych:   (+) headaches:,    (-) TIA and CVA           GI/Hepatic/Renal:   (+) renal disease: CRI, morbid obesity     (-) liver disease       Endo/Other:    (+) DiabetesType II DM, , hypothyroidism::., .                 Abdominal:   (+) obese,           Vascular: negative vascular ROS. Other Findings:           Anesthesia Plan      general     ASA 3     (General, PIV, multimodal analgesia, PACU. I discussed with the patient the risks and benefits to general anesthesia including possible anesthetic complications but not limited to: PONV, damage to the airway and surrounding structures (teeth, lips, gums, tongue, etc.), adverse reactions to medications, cardiac complications (MI, CHF, arrhythmias, etc.), respiratory complications (post-op ventilation, respiratory failure, etc.), neurologic complications (nerve damage, stroke, seizure), the potential for conversion to a general anesthetic, and death. The patient was given the opportunity to ask questions and all questions were answered to the patient's satisfaction.  )  Induction: intravenous.       Anesthetic plan and risks discussed with patient. Plan discussed with CRNA. This pre-anesthesia assessment may be used as a history and physical.    DOS STAFF ADDENDUM:    Pt seen and examined, chart reviewed (including anesthesia, drug and allergy history). No interval changes to history and physical examination. Anesthetic plan, risks, benefits, alternatives, and personnel involved discussed with patient. Patient verbalized an understanding and agrees to proceed.       Jill Enciso MD  April 25, 2022  2:30 PM

## 2022-04-25 NOTE — PROGRESS NOTES
Patient to phase 2 from pacu, vss on room air, patient up to chair, patient denies any pain, spouse at bedside, call light within reach, will continue to monitor. [id number]: Discharge instructions given to patient and spouse, both state understanding and deny having any further questions at this time.

## 2022-04-25 NOTE — INTERVAL H&P NOTE
Update History & Physical    The patient's History and Physical  was reviewed with the patient and I examined the patient. There was no change. The surgical site was confirmed by the patient and me. Plan: The risks, benefits, expected outcome, and alternative to the recommended procedure have been discussed with the patient. Patient understands and wants to proceed with the procedure.      Electronically signed by Katerin Rosales MD on 4/25/2022 at 3:51 PM

## 2022-04-26 LAB — PREGNANCY, URINE: NEGATIVE

## 2022-04-26 NOTE — OP NOTE
ureteroscope easily all the way up to the renal pelvis. There were no stones identified. I then carefully withdrew the  ureteroscope. Again, no stones were seen. My suspicion is the stone  had passed. The wire was withdrawn. The ureteroscope was withdrawn  completely. The cystoscope was inserted to drain the bladder. Then the  cystoscope was withdrawn. She was then awakened and transferred to  recovery having tolerated the procedure well. She will follow up in the  office in 6 weeks.         Lourdes Bird MD    D: 04/25/2022 16:34:50       T: 04/25/2022 16:38:32     [id number]  Job#: [id number]     Doc#: [id number]    CC:

## 2022-05-09 PROBLEM — N39.0 UTI (URINARY TRACT INFECTION): Status: RESOLVED | Noted: 2022-04-09 | Resolved: 2022-05-09

## 2023-03-03 ENCOUNTER — HOSPITAL ENCOUNTER (OUTPATIENT)
Dept: ULTRASOUND IMAGING | Age: 48
Discharge: HOME | End: 2023-03-03
Payer: COMMERCIAL

## 2023-03-03 DIAGNOSIS — N18.30 STAGE 3 CHRONIC KIDNEY DISEASE, UNSPECIFIED WHETHER STAGE 3A OR 3B CKD (HCC): ICD-10-CM

## 2023-03-03 PROCEDURE — 76770 US EXAM ABDO BACK WALL COMP: CPT

## 2024-04-30 ENCOUNTER — HOSPITAL ENCOUNTER (EMERGENCY)
Age: 49
Discharge: HOME | End: 2024-04-30
Payer: COMMERCIAL

## 2024-04-30 ENCOUNTER — APPOINTMENT (OUTPATIENT)
Dept: GENERAL RADIOLOGY | Age: 49
End: 2024-04-30
Payer: COMMERCIAL

## 2024-04-30 VITALS
WEIGHT: 250 LBS | HEART RATE: 79 BPM | RESPIRATION RATE: 16 BRPM | HEIGHT: 67 IN | SYSTOLIC BLOOD PRESSURE: 136 MMHG | DIASTOLIC BLOOD PRESSURE: 88 MMHG | OXYGEN SATURATION: 97 % | TEMPERATURE: 97.8 F | BODY MASS INDEX: 39.24 KG/M2

## 2024-04-30 DIAGNOSIS — M54.42 LEFT-SIDED LOW BACK PAIN WITH LEFT-SIDED SCIATICA, UNSPECIFIED CHRONICITY: Primary | ICD-10-CM

## 2024-04-30 LAB
BACTERIA URNS QL MICRO: ABNORMAL /HPF
BILIRUB UR QL STRIP.AUTO: NEGATIVE
CHARACTER UR: ABNORMAL
CLARITY UR: CLEAR
COLOR UR: YELLOW
EPI CELLS #/AREA URNS AUTO: 5 /HPF (ref 0–5)
GLUCOSE UR STRIP.AUTO-MCNC: NEGATIVE MG/DL
HCG UR QL: NEGATIVE
HGB UR QL STRIP.AUTO: NEGATIVE
HYALINE CASTS #/AREA URNS AUTO: 1 /LPF (ref 0–8)
KETONES UR STRIP.AUTO-MCNC: NEGATIVE MG/DL
LEUKOCYTE ESTERASE UR QL STRIP.AUTO: NEGATIVE
NITRITE UR QL STRIP.AUTO: NEGATIVE
PH UR STRIP.AUTO: 6.5 [PH] (ref 5–8)
PROT UR STRIP.AUTO-MCNC: 100 MG/DL
RBC CLUMPS #/AREA URNS AUTO: 5 /HPF (ref 0–4)
SP GR UR STRIP.AUTO: 1.01 (ref 1–1.03)
UA COMPLETE W REFLEX CULTURE PNL UR: ABNORMAL
UA DIPSTICK W REFLEX MICRO PNL UR: YES
URN SPEC COLLECT METH UR: ABNORMAL
UROBILINOGEN UR STRIP-ACNC: 0.2 E.U./DL
WBC #/AREA URNS AUTO: 1 /HPF (ref 0–5)

## 2024-04-30 PROCEDURE — 6370000000 HC RX 637 (ALT 250 FOR IP): Performed by: GENERAL ACUTE CARE HOSPITAL

## 2024-04-30 PROCEDURE — 81001 URINALYSIS AUTO W/SCOPE: CPT

## 2024-04-30 PROCEDURE — 99284 EMERGENCY DEPT VISIT MOD MDM: CPT

## 2024-04-30 PROCEDURE — 84703 CHORIONIC GONADOTROPIN ASSAY: CPT

## 2024-04-30 PROCEDURE — 96372 THER/PROPH/DIAG INJ SC/IM: CPT

## 2024-04-30 PROCEDURE — 72100 X-RAY EXAM L-S SPINE 2/3 VWS: CPT

## 2024-04-30 PROCEDURE — 6360000002 HC RX W HCPCS: Performed by: GENERAL ACUTE CARE HOSPITAL

## 2024-04-30 RX ORDER — LIDOCAINE 50 MG/G
1 PATCH TOPICAL DAILY
Qty: 10 PATCH | Refills: 0 | Status: SHIPPED | OUTPATIENT
Start: 2024-04-30 | End: 2024-05-10

## 2024-04-30 RX ORDER — CYCLOBENZAPRINE HCL 10 MG
10 TABLET ORAL 3 TIMES DAILY PRN
Qty: 20 TABLET | Refills: 0 | Status: SHIPPED | OUTPATIENT
Start: 2024-04-30 | End: 2024-05-07

## 2024-04-30 RX ORDER — LIDOCAINE 4 G/G
1 PATCH TOPICAL DAILY
Status: DISCONTINUED | OUTPATIENT
Start: 2024-04-30 | End: 2024-04-30 | Stop reason: HOSPADM

## 2024-04-30 RX ORDER — HYDROCODONE BITARTRATE AND ACETAMINOPHEN 5; 325 MG/1; MG/1
1 TABLET ORAL ONCE
Status: COMPLETED | OUTPATIENT
Start: 2024-04-30 | End: 2024-04-30

## 2024-04-30 RX ORDER — DEXAMETHASONE SODIUM PHOSPHATE 4 MG/ML
10 INJECTION, SOLUTION INTRA-ARTICULAR; INTRALESIONAL; INTRAMUSCULAR; INTRAVENOUS; SOFT TISSUE ONCE
Status: COMPLETED | OUTPATIENT
Start: 2024-04-30 | End: 2024-04-30

## 2024-04-30 RX ORDER — ORPHENADRINE CITRATE 30 MG/ML
60 INJECTION INTRAMUSCULAR; INTRAVENOUS ONCE
Status: COMPLETED | OUTPATIENT
Start: 2024-04-30 | End: 2024-04-30

## 2024-04-30 RX ADMIN — DEXAMETHASONE SODIUM PHOSPHATE 10 MG: 4 INJECTION INTRA-ARTICULAR; INTRALESIONAL; INTRAMUSCULAR; INTRAVENOUS; SOFT TISSUE at 19:04

## 2024-04-30 RX ADMIN — ORPHENADRINE CITRATE 60 MG: 30 INJECTION, SOLUTION INTRAMUSCULAR; INTRAVENOUS at 19:03

## 2024-04-30 RX ADMIN — HYDROCODONE BITARTRATE AND ACETAMINOPHEN 1 TABLET: 5; 325 TABLET ORAL at 19:02

## 2024-04-30 ASSESSMENT — ENCOUNTER SYMPTOMS
NAUSEA: 0
VOMITING: 0
CHEST TIGHTNESS: 0
SORE THROAT: 0
ABDOMINAL PAIN: 0
COUGH: 0
VOICE CHANGE: 0
WHEEZING: 0
SHORTNESS OF BREATH: 0
BACK PAIN: 1

## 2024-04-30 ASSESSMENT — PAIN SCALES - GENERAL
PAINLEVEL_OUTOF10: 5
PAINLEVEL_OUTOF10: 10

## 2024-04-30 ASSESSMENT — PAIN DESCRIPTION - ORIENTATION
ORIENTATION: LEFT;LOWER
ORIENTATION: LEFT;LOWER

## 2024-04-30 ASSESSMENT — PAIN DESCRIPTION - LOCATION
LOCATION: BACK
LOCATION: BACK

## 2024-04-30 ASSESSMENT — PAIN DESCRIPTION - PAIN TYPE
TYPE: ACUTE PAIN
TYPE: ACUTE PAIN

## 2024-04-30 ASSESSMENT — PAIN DESCRIPTION - DESCRIPTORS
DESCRIPTORS: DISCOMFORT
DESCRIPTORS: DISCOMFORT

## 2024-04-30 NOTE — ED PROVIDER NOTES
[facility] EMERGENCY DEPARTMENT  EMERGENCY DEPARTMENT ENCOUNTER        Pt Name: Nini Bui  MRN: [id number]  Birthdate 1975  Date of evaluation: 4/30/2024  Provider: EMILIE Rasheed CNP  PCP: Maude Fraire APRN - CNP  Note Started: 7:49 PM EDT 4/30/24      JAYLAN. I have evaluated this patient.        CHIEF COMPLAINT       Chief Complaint   Patient presents with    Back Pain     Patient reports lower left side back pain, onset Sunday. No known injury. Radiate down left leg. Reports urinating on self due to unable to get up quick enough due to pain.        HISTORY OF PRESENT ILLNESS: 1 or more Elements     History From: Patient  Limitations to history : None    Nini Bui is a 48 y.o. female who presents to the emergency department today for evaluation of left lower back pain which began on Sunday.  Patient denies known injury.  Patient has history of any chronic back problems.  She denies previous back surgeries.  Patient states that pain is worse with movement and somewhat better with rest.  Patient states that she is on her feet all day at work but denies heavy lifting or strenuous activity.  There is no history of IV drug abuse or any cancers.  Patient states the pain radiates into her buttocks and down her left leg at times.  Patient states that there has been no loss of bowel or bladder control but states that she did accidentally urinate on herself today because she could not get up quick enough to get to the restroom.  Patient states that she has been taking OTC Tylenol with little relief of her symptoms.  Patient does have history of chronic kidney disease, avoids NSAIDs.  She currently ports a pain level of 8 out of 10.  She describes this pain as constant, dull, and aching.  She does get intermittent sharp pains that are migratory.  Patient was driven here by her significant other.  Patient states that she has otherwise felt well and has been without fever,

## 2024-04-30 NOTE — ED TRIAGE NOTES
Patient reports lower left side back pain, onset Sunday. No known injury. Radiate down left leg. Reports urinating on self due to unable to get up quick enough due to pain.

## 2024-05-01 NOTE — DISCHARGE INSTRUCTIONS
Apply ice to the affected area for 20 minutes every 3-4 hours    Use medications as directed    Follow up with your PCP and nephrologist on Friday at your scheduled appointment.

## 2024-06-20 ENCOUNTER — APPOINTMENT (OUTPATIENT)
Dept: CT IMAGING | Age: 49
End: 2024-06-20
Payer: COMMERCIAL

## 2024-06-20 ENCOUNTER — HOSPITAL ENCOUNTER (EMERGENCY)
Age: 49
Discharge: HOME | End: 2024-06-20
Payer: COMMERCIAL

## 2024-06-20 VITALS
OXYGEN SATURATION: 98 % | SYSTOLIC BLOOD PRESSURE: 119 MMHG | TEMPERATURE: 97.8 F | DIASTOLIC BLOOD PRESSURE: 78 MMHG | RESPIRATION RATE: 18 BRPM | HEART RATE: 84 BPM

## 2024-06-20 DIAGNOSIS — K57.32 DIVERTICULITIS OF COLON: Primary | ICD-10-CM

## 2024-06-20 LAB
ALBUMIN SERPL-MCNC: 4.3 G/DL (ref 3.4–5)
ALBUMIN/GLOB SERPL: 1.1 {RATIO} (ref 1.1–2.2)
ALP SERPL-CCNC: 100 U/L (ref 40–129)
ALT SERPL-CCNC: 13 U/L (ref 10–40)
ANION GAP SERPL CALCULATED.3IONS-SCNC: 15 MMOL/L (ref 3–16)
AST SERPL-CCNC: 10 U/L (ref 15–37)
BACTERIA URNS QL MICRO: ABNORMAL /HPF
BASOPHILS # BLD: 0.1 K/UL (ref 0–0.2)
BASOPHILS NFR BLD: 0.9 %
BILIRUB SERPL-MCNC: 0.3 MG/DL (ref 0–1)
BILIRUB UR QL STRIP.AUTO: NEGATIVE
BUN SERPL-MCNC: 52 MG/DL (ref 7–20)
CALCIUM SERPL-MCNC: 11 MG/DL (ref 8.3–10.6)
CHLORIDE SERPL-SCNC: 97 MMOL/L (ref 99–110)
CLARITY UR: ABNORMAL
CO2 SERPL-SCNC: 26 MMOL/L (ref 21–32)
COLOR UR: YELLOW
CREAT SERPL-MCNC: 2.2 MG/DL (ref 0.6–1.1)
DEPRECATED RDW RBC AUTO: 13.9 % (ref 12.4–15.4)
EOSINOPHIL # BLD: 0 K/UL (ref 0–0.6)
EOSINOPHIL NFR BLD: 0.3 %
EPI CELLS #/AREA URNS AUTO: 3 /HPF (ref 0–5)
GFR SERPLBLD CREATININE-BSD FMLA CKD-EPI: 27 ML/MIN/{1.73_M2}
GLUCOSE SERPL-MCNC: 97 MG/DL (ref 70–99)
GLUCOSE UR STRIP.AUTO-MCNC: NEGATIVE MG/DL
HCG SERPL QL: NEGATIVE
HCT VFR BLD AUTO: 31.3 % (ref 36–48)
HGB BLD-MCNC: 10.8 G/DL (ref 12–16)
HGB UR QL STRIP.AUTO: NEGATIVE
HYALINE CASTS #/AREA URNS AUTO: 1 /LPF (ref 0–8)
KETONES UR STRIP.AUTO-MCNC: NEGATIVE MG/DL
LEUKOCYTE ESTERASE UR QL STRIP.AUTO: NEGATIVE
LIPASE SERPL-CCNC: 25 U/L (ref 13–60)
LYMPHOCYTES # BLD: 2.2 K/UL (ref 1–5.1)
LYMPHOCYTES NFR BLD: 16.4 %
MAGNESIUM SERPL-MCNC: 1.8 MG/DL (ref 1.8–2.4)
MCH RBC QN AUTO: 32.4 PG (ref 26–34)
MCHC RBC AUTO-ENTMCNC: 34.6 G/DL (ref 31–36)
MCV RBC AUTO: 93.5 FL (ref 80–100)
MONOCYTES # BLD: 0.5 K/UL (ref 0–1.3)
MONOCYTES NFR BLD: 3.6 %
NEUTROPHILS # BLD: 10.3 K/UL (ref 1.7–7.7)
NEUTROPHILS NFR BLD: 78.8 %
NITRITE UR QL STRIP.AUTO: NEGATIVE
PH UR STRIP.AUTO: 5.5 [PH] (ref 5–8)
PLATELET # BLD AUTO: 355 K/UL (ref 135–450)
PMV BLD AUTO: 8.7 FL (ref 5–10.5)
POTASSIUM SERPL-SCNC: 3.2 MMOL/L (ref 3.5–5.1)
PROT SERPL-MCNC: 8.3 G/DL (ref 6.4–8.2)
PROT UR STRIP.AUTO-MCNC: 30 MG/DL
RBC # BLD AUTO: 3.35 M/UL (ref 4–5.2)
RBC CLUMPS #/AREA URNS AUTO: 3 /HPF (ref 0–4)
SODIUM SERPL-SCNC: 138 MMOL/L (ref 136–145)
SP GR UR STRIP.AUTO: 1.01 (ref 1–1.03)
UA COMPLETE W REFLEX CULTURE PNL UR: ABNORMAL
UA DIPSTICK W REFLEX MICRO PNL UR: YES
URN SPEC COLLECT METH UR: ABNORMAL
UROBILINOGEN UR STRIP-ACNC: 0.2 E.U./DL
WBC # BLD AUTO: 13.1 K/UL (ref 4–11)
WBC #/AREA URNS AUTO: 1 /HPF (ref 0–5)

## 2024-06-20 PROCEDURE — 6370000000 HC RX 637 (ALT 250 FOR IP)

## 2024-06-20 PROCEDURE — 6360000002 HC RX W HCPCS

## 2024-06-20 PROCEDURE — 80053 COMPREHEN METABOLIC PANEL: CPT

## 2024-06-20 PROCEDURE — 85025 COMPLETE CBC W/AUTO DIFF WBC: CPT

## 2024-06-20 PROCEDURE — 96372 THER/PROPH/DIAG INJ SC/IM: CPT

## 2024-06-20 PROCEDURE — 83735 ASSAY OF MAGNESIUM: CPT

## 2024-06-20 PROCEDURE — 2580000003 HC RX 258

## 2024-06-20 PROCEDURE — 96374 THER/PROPH/DIAG INJ IV PUSH: CPT

## 2024-06-20 PROCEDURE — 81001 URINALYSIS AUTO W/SCOPE: CPT

## 2024-06-20 PROCEDURE — 74176 CT ABD & PELVIS W/O CONTRAST: CPT

## 2024-06-20 PROCEDURE — 83690 ASSAY OF LIPASE: CPT

## 2024-06-20 PROCEDURE — 84703 CHORIONIC GONADOTROPIN ASSAY: CPT

## 2024-06-20 PROCEDURE — 99284 EMERGENCY DEPT VISIT MOD MDM: CPT

## 2024-06-20 PROCEDURE — 36415 COLL VENOUS BLD VENIPUNCTURE: CPT

## 2024-06-20 RX ORDER — MORPHINE SULFATE 4 MG/ML
4 INJECTION, SOLUTION INTRAMUSCULAR; INTRAVENOUS
Status: DISCONTINUED | OUTPATIENT
Start: 2024-06-20 | End: 2024-06-20

## 2024-06-20 RX ORDER — CIPROFLOXACIN 500 MG/1
500 TABLET, FILM COATED ORAL 2 TIMES DAILY
Qty: 14 TABLET | Refills: 0 | Status: SHIPPED | OUTPATIENT
Start: 2024-06-20 | End: 2024-06-27

## 2024-06-20 RX ORDER — MORPHINE SULFATE 4 MG/ML
4 INJECTION, SOLUTION INTRAMUSCULAR; INTRAVENOUS ONCE
Status: COMPLETED | OUTPATIENT
Start: 2024-06-20 | End: 2024-06-20

## 2024-06-20 RX ORDER — SODIUM CHLORIDE, SODIUM LACTATE, POTASSIUM CHLORIDE, AND CALCIUM CHLORIDE .6; .31; .03; .02 G/100ML; G/100ML; G/100ML; G/100ML
1000 INJECTION, SOLUTION INTRAVENOUS ONCE
Status: COMPLETED | OUTPATIENT
Start: 2024-06-20 | End: 2024-06-20

## 2024-06-20 RX ORDER — METRONIDAZOLE 500 MG/1
500 TABLET ORAL 2 TIMES DAILY
Qty: 14 TABLET | Refills: 0 | Status: SHIPPED | OUTPATIENT
Start: 2024-06-20 | End: 2024-06-27

## 2024-06-20 RX ORDER — CIPROFLOXACIN 500 MG/1
500 TABLET, FILM COATED ORAL ONCE
Status: COMPLETED | OUTPATIENT
Start: 2024-06-20 | End: 2024-06-20

## 2024-06-20 RX ORDER — ONDANSETRON 4 MG/1
4 TABLET, ORALLY DISINTEGRATING ORAL 3 TIMES DAILY PRN
Qty: 21 TABLET | Refills: 0 | Status: SHIPPED | OUTPATIENT
Start: 2024-06-20

## 2024-06-20 RX ORDER — METRONIDAZOLE 500 MG/1
500 TABLET ORAL ONCE
Status: COMPLETED | OUTPATIENT
Start: 2024-06-20 | End: 2024-06-20

## 2024-06-20 RX ORDER — ONDANSETRON 2 MG/ML
4 INJECTION INTRAMUSCULAR; INTRAVENOUS ONCE
Status: COMPLETED | OUTPATIENT
Start: 2024-06-20 | End: 2024-06-20

## 2024-06-20 RX ADMIN — CIPROFLOXACIN 500 MG: 500 TABLET, FILM COATED ORAL at 21:13

## 2024-06-20 RX ADMIN — METRONIDAZOLE 500 MG: 500 TABLET ORAL at 21:13

## 2024-06-20 RX ADMIN — SODIUM CHLORIDE, POTASSIUM CHLORIDE, SODIUM LACTATE AND CALCIUM CHLORIDE 1000 ML: 600; 310; 30; 20 INJECTION, SOLUTION INTRAVENOUS at 18:33

## 2024-06-20 RX ADMIN — MORPHINE SULFATE 4 MG: 4 INJECTION, SOLUTION INTRAMUSCULAR; INTRAVENOUS at 18:35

## 2024-06-20 RX ADMIN — ONDANSETRON 4 MG: 2 INJECTION INTRAMUSCULAR; INTRAVENOUS at 18:34

## 2024-06-20 ASSESSMENT — ENCOUNTER SYMPTOMS
RHINORRHEA: 0
SINUS PAIN: 0
TROUBLE SWALLOWING: 0
SORE THROAT: 0
ABDOMINAL PAIN: 1
NAUSEA: 0
WHEEZING: 0
VOMITING: 1
SINUS PRESSURE: 0
CHEST TIGHTNESS: 0
SHORTNESS OF BREATH: 0
COUGH: 0

## 2024-06-21 NOTE — ED NOTES

## 2024-06-21 NOTE — ED PROVIDER NOTES
Effort: Pulmonary effort is normal. No tachypnea, bradypnea, accessory muscle usage, prolonged expiration or respiratory distress.      Breath sounds: Normal breath sounds. No stridor, decreased air movement or transmitted upper airway sounds. No decreased breath sounds, wheezing, rhonchi or rales.   Abdominal:      General: Abdomen is flat.      Palpations: Abdomen is soft.      Tenderness: There is abdominal tenderness in the left lower quadrant. There is no right CVA tenderness, left CVA tenderness, guarding or rebound. Negative signs include Tellez's sign, Rovsing's sign and McBurney's sign.   Musculoskeletal:      Cervical back: Normal range of motion and neck supple. No tenderness.   Skin:     Capillary Refill: Capillary refill takes less than 2 seconds.   Neurological:      General: No focal deficit present.      Mental Status: She is alert, oriented to person, place, and time and easily aroused.      GCS: GCS eye subscore is 4. GCS verbal subscore is 5. GCS motor subscore is 6.      Cranial Nerves: No cranial nerve deficit.      Sensory: No sensory deficit.      Motor: Motor function is intact. No weakness.      Coordination: Coordination is intact. Coordination normal.      Gait: Gait is intact. Gait normal.   Psychiatric:         Attention and Perception: Attention and perception normal.         Mood and Affect: Mood and affect normal.         Speech: Speech normal.         Behavior: Behavior normal. Behavior is cooperative.         Thought Content: Thought content normal.         Judgment: Judgment normal.           DIAGNOSTIC RESULTS   LABS:    Labs Reviewed   CBC WITH AUTO DIFFERENTIAL - Abnormal; Notable for the following components:       Result Value    WBC 13.1 (*)     RBC 3.35 (*)     Hemoglobin 10.8 (*)     Hematocrit 31.3 (*)     Neutrophils Absolute 10.3 (*)     All other components within normal limits   COMPREHENSIVE METABOLIC PANEL W/ REFLEX TO MG FOR LOW K - Abnormal; Notable for the

## 2024-06-29 ENCOUNTER — HOSPITAL ENCOUNTER (EMERGENCY)
Age: 49
Discharge: HOME | End: 2024-06-29
Attending: EMERGENCY MEDICINE
Payer: COMMERCIAL

## 2024-06-29 ENCOUNTER — APPOINTMENT (OUTPATIENT)
Dept: CT IMAGING | Age: 49
End: 2024-06-29
Payer: COMMERCIAL

## 2024-06-29 VITALS
BODY MASS INDEX: 39.27 KG/M2 | SYSTOLIC BLOOD PRESSURE: 124 MMHG | OXYGEN SATURATION: 92 % | HEART RATE: 91 BPM | RESPIRATION RATE: 16 BRPM | DIASTOLIC BLOOD PRESSURE: 76 MMHG | HEIGHT: 67 IN | TEMPERATURE: 97.7 F | WEIGHT: 250.22 LBS

## 2024-06-29 DIAGNOSIS — K59.00 CONSTIPATION, UNSPECIFIED CONSTIPATION TYPE: Primary | ICD-10-CM

## 2024-06-29 DIAGNOSIS — E87.6 HYPOKALEMIA: ICD-10-CM

## 2024-06-29 LAB
ALBUMIN SERPL-MCNC: 4.3 G/DL (ref 3.4–5)
ALBUMIN/GLOB SERPL: 1 {RATIO} (ref 1.1–2.2)
ALP SERPL-CCNC: 95 U/L (ref 40–129)
ALT SERPL-CCNC: 7 U/L (ref 10–40)
ANION GAP SERPL CALCULATED.3IONS-SCNC: 15 MMOL/L (ref 3–16)
AST SERPL-CCNC: 11 U/L (ref 15–37)
BASOPHILS # BLD: 0.1 K/UL (ref 0–0.2)
BASOPHILS NFR BLD: 0.8 %
BILIRUB SERPL-MCNC: <0.2 MG/DL (ref 0–1)
BUN SERPL-MCNC: 49 MG/DL (ref 7–20)
CALCIUM SERPL-MCNC: 9.9 MG/DL (ref 8.3–10.6)
CHLORIDE SERPL-SCNC: 96 MMOL/L (ref 99–110)
CO2 SERPL-SCNC: 25 MMOL/L (ref 21–32)
CREAT SERPL-MCNC: 2.9 MG/DL (ref 0.6–1.1)
DEPRECATED RDW RBC AUTO: 14 % (ref 12.4–15.4)
EOSINOPHIL # BLD: 0.1 K/UL (ref 0–0.6)
EOSINOPHIL NFR BLD: 0.6 %
GFR SERPLBLD CREATININE-BSD FMLA CKD-EPI: 19 ML/MIN/{1.73_M2}
GLUCOSE SERPL-MCNC: 117 MG/DL (ref 70–99)
HCT VFR BLD AUTO: 30.6 % (ref 36–48)
HGB BLD-MCNC: 10.8 G/DL (ref 12–16)
LIPASE SERPL-CCNC: 24 U/L (ref 13–60)
LYMPHOCYTES # BLD: 2 K/UL (ref 1–5.1)
LYMPHOCYTES NFR BLD: 17.2 %
MAGNESIUM SERPL-MCNC: 1.8 MG/DL (ref 1.8–2.4)
MCH RBC QN AUTO: 32.4 PG (ref 26–34)
MCHC RBC AUTO-ENTMCNC: 35.2 G/DL (ref 31–36)
MCV RBC AUTO: 91.9 FL (ref 80–100)
MONOCYTES # BLD: 0.5 K/UL (ref 0–1.3)
MONOCYTES NFR BLD: 4 %
NEUTROPHILS # BLD: 8.9 K/UL (ref 1.7–7.7)
NEUTROPHILS NFR BLD: 77.4 %
PLATELET # BLD AUTO: 468 K/UL (ref 135–450)
PMV BLD AUTO: 8.1 FL (ref 5–10.5)
POTASSIUM SERPL-SCNC: 3 MMOL/L (ref 3.5–5.1)
PROT SERPL-MCNC: 8.7 G/DL (ref 6.4–8.2)
RBC # BLD AUTO: 3.33 M/UL (ref 4–5.2)
SODIUM SERPL-SCNC: 136 MMOL/L (ref 136–145)
WBC # BLD AUTO: 11.5 K/UL (ref 4–11)

## 2024-06-29 PROCEDURE — 83690 ASSAY OF LIPASE: CPT

## 2024-06-29 PROCEDURE — 85025 COMPLETE CBC W/AUTO DIFF WBC: CPT

## 2024-06-29 PROCEDURE — 80053 COMPREHEN METABOLIC PANEL: CPT

## 2024-06-29 PROCEDURE — 6370000000 HC RX 637 (ALT 250 FOR IP): Performed by: EMERGENCY MEDICINE

## 2024-06-29 PROCEDURE — 83735 ASSAY OF MAGNESIUM: CPT

## 2024-06-29 PROCEDURE — 74176 CT ABD & PELVIS W/O CONTRAST: CPT

## 2024-06-29 PROCEDURE — 99284 EMERGENCY DEPT VISIT MOD MDM: CPT

## 2024-06-29 RX ORDER — POTASSIUM CHLORIDE 750 MG/1
20 TABLET, FILM COATED, EXTENDED RELEASE ORAL ONCE
Status: COMPLETED | OUTPATIENT
Start: 2024-06-29 | End: 2024-06-29

## 2024-06-29 RX ORDER — SENNA AND DOCUSATE SODIUM 50; 8.6 MG/1; MG/1
1 TABLET, FILM COATED ORAL DAILY PRN
Qty: 30 TABLET | Refills: 0 | Status: SHIPPED | OUTPATIENT
Start: 2024-06-29

## 2024-06-29 RX ORDER — ENEMA 19; 7 G/133ML; G/133ML
1 ENEMA RECTAL ONCE
Status: COMPLETED | OUTPATIENT
Start: 2024-06-29 | End: 2024-06-29

## 2024-06-29 RX ADMIN — POTASSIUM CHLORIDE 20 MEQ: 750 TABLET, FILM COATED, EXTENDED RELEASE ORAL at 05:16

## 2024-06-29 RX ADMIN — SODIUM PHOSPHATE 1 ENEMA: 7; 19 ENEMA RECTAL at 03:58

## 2024-06-29 ASSESSMENT — PAIN DESCRIPTION - FREQUENCY: FREQUENCY: CONTINUOUS

## 2024-06-29 ASSESSMENT — PAIN DESCRIPTION - PAIN TYPE: TYPE: ACUTE PAIN

## 2024-06-29 ASSESSMENT — LIFESTYLE VARIABLES
HOW OFTEN DO YOU HAVE A DRINK CONTAINING ALCOHOL: MONTHLY OR LESS
HOW MANY STANDARD DRINKS CONTAINING ALCOHOL DO YOU HAVE ON A TYPICAL DAY: 1 OR 2

## 2024-06-29 ASSESSMENT — PAIN - FUNCTIONAL ASSESSMENT
PAIN_FUNCTIONAL_ASSESSMENT: ACTIVITIES ARE NOT PREVENTED
PAIN_FUNCTIONAL_ASSESSMENT: 0-10

## 2024-06-29 ASSESSMENT — PAIN DESCRIPTION - DESCRIPTORS: DESCRIPTORS: CRAMPING

## 2024-06-29 ASSESSMENT — PAIN DESCRIPTION - LOCATION: LOCATION: ABDOMEN

## 2024-06-29 ASSESSMENT — PAIN SCALES - GENERAL: PAINLEVEL_OUTOF10: 10

## 2024-06-29 NOTE — DISCHARGE INSTRUCTIONS
Your potassium was slightly low.  We gave you some potassium repletion in the emergency department. Eat potassium rich foods and follow-up with your doctor for reassessment.  Eat plenty of fiber (fruits and vegetables, whole grains), drink plenty of fluids and stay active to prevent constipation.  Use MiraLAX and senna as needed for constipation.

## 2024-06-29 NOTE — ED NOTES
Saline enema completed at this time. Pt tolerated well. Pt attempting to hold enema in as long as possible at this time.

## 2024-06-29 NOTE — ED PROVIDER NOTES
[facility] EMERGENCY DEPARTMENT    CHIEF COMPLAINT  Constipation (Pt states she hasn't had a BM for weeks. States that she has been having abdominal pain along with the constipation. )       HISTORY OF PRESENT ILLNESS  Nini Bui is a 48 y.o. female who presents to the ED with diffuse abdominal pain and constipation.  States she has not had a substantial bowel movement in several weeks.  She has had a few tiny hard stools but has not had one in several days.  Decreased flatus, reports diffuse abdominal pain and distention.  Denies fever, vomiting, nausea.  She is on semaglutide for weight loss but denies any other medication changes.  She has tried several doses of MiraLAX in the past couple of days with no relief.    I have reviewed the following from the nursing documentation:    Past Medical History:   Diagnosis Date    Asthma     Diabetes mellitus (HCC)     Hyperlipidemia     Hypertension     Kidney stone 2022    Thyroid disease     thyroid removed d/t hyperthyroidism     Past Surgical History:   Procedure Laterality Date    APPENDECTOMY      BLADDER SURGERY Right 4/10/2022    CYSTOSCOPY  RIGHT RETROGRADE PYELOGRAM STENT INSERTION performed by Etta Mccracken MD at [facility]    BLADDER SURGERY Right 4/25/2022    RIGHT STENT REMOVAL performed by Etta Mccracken MD at [facility] OR    CYSTOSCOPY Right 4/25/2022    CYSTOSCOPY RIGHT URETEROSCOPY performed by Etta Mccracken MD at [facility] OR    TOTAL THYROIDECTOMY       Family History   Problem Relation Age of Onset    Cancer Mother         lung cancer    No Known Problems Father      Social History     Socioeconomic History    Marital status: Single     Spouse name: Not on file    Number of children: Not on file    Years of education: Not on file    Highest education level: Not on file   Occupational History    Not on file   Tobacco Use    Smoking status: Every Day     Current packs/day: 0.25     Average packs/day: 0.3 packs/day

## 2024-06-29 NOTE — ED NOTES
D/C: Order noted for d/c. Pt confirmed d/c paperwork has correct name. Discharge and education instructions reviewed with patient. Teach-back successful.  Pt verbalized understanding and denied questions at this time. No acute distress noted. Patient instructed to follow-up as noted - return to emergency department if symptoms worsen. Patient verbalized understanding. Discharged per EDMD with discharge instructions. Pt discharged to private vehicle. Patient stable upon departure. Thanked patient for [facility] for care. Provider aware of patient pain at time of discharge.

## 2024-06-29 NOTE — ED NOTES
Patient resting comfortably, respirations easy, unlabored. Patient in no acute distress. Denies needs at this time. Call light within reach, bed in lowest position, side rails up x 2. Family at bedside at this time for support. Warm blanket applied, patient repositioned for comfort at this time.

## (undated) DEVICE — SEAL ENDOSCP INSTR BX PRT FOR ACC UPTO 3FR

## (undated) DEVICE — SOLUTION IV IRRIG WATER 1000ML POUR BRL 2F7114

## (undated) DEVICE — GLOVE SURG SZ 65 CRM LTX FREE POLYISOPRENE POLYMER BEAD ANTI

## (undated) DEVICE — GOWN SIRUS NONREIN XL W/TWL: Brand: MEDLINE INDUSTRIES, INC.

## (undated) DEVICE — CYSTOSCOPY: Brand: MEDLINE INDUSTRIES, INC.

## (undated) DEVICE — SYRINGE MED 10ML LUERLOCK TIP W/O SFTY DISP

## (undated) DEVICE — DRAINBAG,ANTI-REFLUX TOWER,L/F,2000ML,LL: Brand: MEDLINE

## (undated) DEVICE — GUIDEWIRE URO L150CM DIA0.035IN TAPR 8CM STR TIP STD SHFT

## (undated) DEVICE — SOLUTION IRRIG 3000ML STRL H2O USP UROMATIC PLAS CONT

## (undated) DEVICE — RENTAL LASER HOLMIUM  LOW WATT STNDBY

## (undated) DEVICE — SYRINGE MED 10ML SLIP TIP BLNT FILL AND LUERLOCK DISP